# Patient Record
Sex: FEMALE | Race: WHITE | Employment: OTHER | URBAN - METROPOLITAN AREA
[De-identification: names, ages, dates, MRNs, and addresses within clinical notes are randomized per-mention and may not be internally consistent; named-entity substitution may affect disease eponyms.]

---

## 2018-07-13 ENCOUNTER — HOSPITAL ENCOUNTER (OUTPATIENT)
Age: 78
Discharge: HOME | End: 2018-07-15
Payer: MEDICARE

## 2018-07-13 ENCOUNTER — OFFICE VISIT (OUTPATIENT)
Dept: FAMILY MEDICINE CLINIC | Age: 78
End: 2018-07-13
Payer: MEDICARE

## 2018-07-13 VITALS
BODY MASS INDEX: 25.95 KG/M2 | WEIGHT: 141 LBS | HEART RATE: 85 BPM | DIASTOLIC BLOOD PRESSURE: 80 MMHG | SYSTOLIC BLOOD PRESSURE: 136 MMHG | OXYGEN SATURATION: 95 % | HEIGHT: 62 IN

## 2018-07-13 DIAGNOSIS — M79.602 PARESTHESIA AND PAIN OF BOTH UPPER EXTREMITIES: ICD-10-CM

## 2018-07-13 DIAGNOSIS — R53.82 CHRONIC FATIGUE: ICD-10-CM

## 2018-07-13 DIAGNOSIS — R73.01 IFG (IMPAIRED FASTING GLUCOSE): ICD-10-CM

## 2018-07-13 DIAGNOSIS — M79.601 PARESTHESIA AND PAIN OF BOTH UPPER EXTREMITIES: ICD-10-CM

## 2018-07-13 DIAGNOSIS — R20.2 PARESTHESIA AND PAIN OF BOTH UPPER EXTREMITIES: ICD-10-CM

## 2018-07-13 DIAGNOSIS — R20.2 PARESTHESIA AND PAIN OF BOTH UPPER EXTREMITIES: Primary | ICD-10-CM

## 2018-07-13 DIAGNOSIS — M79.602 PARESTHESIA AND PAIN OF BOTH UPPER EXTREMITIES: Primary | ICD-10-CM

## 2018-07-13 DIAGNOSIS — M79.601 PARESTHESIA AND PAIN OF BOTH UPPER EXTREMITIES: Primary | ICD-10-CM

## 2018-07-13 DIAGNOSIS — R20.2 PARESTHESIA OF BOTH LOWER EXTREMITIES: ICD-10-CM

## 2018-07-13 LAB
ALBUMIN SERPL-MCNC: 4.2 G/DL (ref 3.5–5.2)
ALP BLD-CCNC: 86 U/L (ref 35–104)
ALT SERPL-CCNC: 11 U/L (ref 0–32)
ANION GAP SERPL CALCULATED.3IONS-SCNC: 15 MMOL/L (ref 7–16)
AST SERPL-CCNC: 17 U/L (ref 0–31)
BASOPHILS ABSOLUTE: 0.04 E9/L (ref 0–0.2)
BASOPHILS RELATIVE PERCENT: 0.5 % (ref 0–2)
BILIRUB SERPL-MCNC: 0.5 MG/DL (ref 0–1.2)
BUN BLDV-MCNC: 9 MG/DL (ref 8–23)
CALCIUM SERPL-MCNC: 9.7 MG/DL (ref 8.6–10.2)
CHLORIDE BLD-SCNC: 99 MMOL/L (ref 98–107)
CO2: 28 MMOL/L (ref 22–29)
CREAT SERPL-MCNC: 0.8 MG/DL (ref 0.5–1)
EOSINOPHILS ABSOLUTE: 0.08 E9/L (ref 0.05–0.5)
EOSINOPHILS RELATIVE PERCENT: 1 % (ref 0–6)
FOLATE: 14.5 NG/ML (ref 4.8–24.2)
GFR AFRICAN AMERICAN: >60
GFR NON-AFRICAN AMERICAN: >60 ML/MIN/1.73
GLUCOSE BLD-MCNC: 73 MG/DL (ref 74–109)
HBA1C MFR BLD: 5.2 % (ref 4–5.6)
HCT VFR BLD CALC: 54.5 % (ref 34–48)
HEMOGLOBIN: 17.7 G/DL (ref 11.5–15.5)
IMMATURE GRANULOCYTES #: 0.05 E9/L
IMMATURE GRANULOCYTES %: 0.6 % (ref 0–5)
LYMPHOCYTES ABSOLUTE: 1.72 E9/L (ref 1.5–4)
LYMPHOCYTES RELATIVE PERCENT: 20.8 % (ref 20–42)
MCH RBC QN AUTO: 32.6 PG (ref 26–35)
MCHC RBC AUTO-ENTMCNC: 32.5 % (ref 32–34.5)
MCV RBC AUTO: 100.4 FL (ref 80–99.9)
MONOCYTES ABSOLUTE: 0.67 E9/L (ref 0.1–0.95)
MONOCYTES RELATIVE PERCENT: 8.1 % (ref 2–12)
NEUTROPHILS ABSOLUTE: 5.71 E9/L (ref 1.8–7.3)
NEUTROPHILS RELATIVE PERCENT: 69 % (ref 43–80)
PDW BLD-RTO: 12.8 FL (ref 11.5–15)
PLATELET # BLD: 244 E9/L (ref 130–450)
PMV BLD AUTO: 9.2 FL (ref 7–12)
POTASSIUM SERPL-SCNC: 4.3 MMOL/L (ref 3.5–5)
RBC # BLD: 5.43 E12/L (ref 3.5–5.5)
RHEUMATOID FACTOR: 18 IU/ML (ref 0–13)
SODIUM BLD-SCNC: 142 MMOL/L (ref 132–146)
T4 FREE: 1.3 NG/DL (ref 0.93–1.7)
TOTAL PROTEIN: 7.8 G/DL (ref 6.4–8.3)
TSH SERPL DL<=0.05 MIU/L-ACNC: 2.26 UIU/ML (ref 0.27–4.2)
VITAMIN B-12: 347 PG/ML (ref 211–946)
WBC # BLD: 8.3 E9/L (ref 4.5–11.5)

## 2018-07-13 PROCEDURE — 86200 CCP ANTIBODY: CPT

## 2018-07-13 PROCEDURE — 1123F ACP DISCUSS/DSCN MKR DOCD: CPT | Performed by: FAMILY MEDICINE

## 2018-07-13 PROCEDURE — 99214 OFFICE O/P EST MOD 30 MIN: CPT | Performed by: FAMILY MEDICINE

## 2018-07-13 PROCEDURE — 1101F PT FALLS ASSESS-DOCD LE1/YR: CPT | Performed by: FAMILY MEDICINE

## 2018-07-13 PROCEDURE — 1090F PRES/ABSN URINE INCON ASSESS: CPT | Performed by: FAMILY MEDICINE

## 2018-07-13 PROCEDURE — 4004F PT TOBACCO SCREEN RCVD TLK: CPT | Performed by: FAMILY MEDICINE

## 2018-07-13 PROCEDURE — 85651 RBC SED RATE NONAUTOMATED: CPT

## 2018-07-13 PROCEDURE — 86431 RHEUMATOID FACTOR QUANT: CPT

## 2018-07-13 PROCEDURE — G8400 PT W/DXA NO RESULTS DOC: HCPCS | Performed by: FAMILY MEDICINE

## 2018-07-13 PROCEDURE — G8419 CALC BMI OUT NRM PARAM NOF/U: HCPCS | Performed by: FAMILY MEDICINE

## 2018-07-13 PROCEDURE — 82607 VITAMIN B-12: CPT

## 2018-07-13 PROCEDURE — 84439 ASSAY OF FREE THYROXINE: CPT

## 2018-07-13 PROCEDURE — 80053 COMPREHEN METABOLIC PANEL: CPT

## 2018-07-13 PROCEDURE — 83036 HEMOGLOBIN GLYCOSYLATED A1C: CPT

## 2018-07-13 PROCEDURE — 84443 ASSAY THYROID STIM HORMONE: CPT

## 2018-07-13 PROCEDURE — 4040F PNEUMOC VAC/ADMIN/RCVD: CPT | Performed by: FAMILY MEDICINE

## 2018-07-13 PROCEDURE — 85025 COMPLETE CBC W/AUTO DIFF WBC: CPT

## 2018-07-13 PROCEDURE — 82746 ASSAY OF FOLIC ACID SERUM: CPT

## 2018-07-13 PROCEDURE — G8427 DOCREV CUR MEDS BY ELIG CLIN: HCPCS | Performed by: FAMILY MEDICINE

## 2018-07-13 PROCEDURE — 36415 COLL VENOUS BLD VENIPUNCTURE: CPT

## 2018-07-13 ASSESSMENT — PATIENT HEALTH QUESTIONNAIRE - PHQ9
SUM OF ALL RESPONSES TO PHQ9 QUESTIONS 1 & 2: 1
2. FEELING DOWN, DEPRESSED OR HOPELESS: 1
2. FEELING DOWN, DEPRESSED OR HOPELESS: 0
1. LITTLE INTEREST OR PLEASURE IN DOING THINGS: 0
SUM OF ALL RESPONSES TO PHQ QUESTIONS 1-9: 1
1. LITTLE INTEREST OR PLEASURE IN DOING THINGS: 0
SUM OF ALL RESPONSES TO PHQ QUESTIONS 1-9: 0
SUM OF ALL RESPONSES TO PHQ9 QUESTIONS 1 & 2: 0
SUM OF ALL RESPONSES TO PHQ QUESTIONS 1-9: 0
1. LITTLE INTEREST OR PLEASURE IN DOING THINGS: 0
1. LITTLE INTEREST OR PLEASURE IN DOING THINGS: 0
SUM OF ALL RESPONSES TO PHQ9 QUESTIONS 1 & 2: 0
SUM OF ALL RESPONSES TO PHQ QUESTIONS 1-9: 0
2. FEELING DOWN, DEPRESSED OR HOPELESS: 0
SUM OF ALL RESPONSES TO PHQ9 QUESTIONS 1 & 2: 0
2. FEELING DOWN, DEPRESSED OR HOPELESS: 0

## 2018-07-14 LAB — SEDIMENTATION RATE, ERYTHROCYTE: 10 MM/HR (ref 0–20)

## 2018-07-16 LAB — CCP IGG ANTIBODIES: 10 UNITS (ref 0–19)

## 2018-07-16 ASSESSMENT — ENCOUNTER SYMPTOMS
EYE DISCHARGE: 0
PHOTOPHOBIA: 0
STRIDOR: 0
FACIAL SWELLING: 0
CHOKING: 0
EYE PAIN: 0
ABDOMINAL DISTENTION: 0
BLOOD IN STOOL: 0
EYE ITCHING: 0
BACK PAIN: 0
VOICE CHANGE: 0
TROUBLE SWALLOWING: 0
CONSTIPATION: 0
SINUS PRESSURE: 0
COUGH: 0
COLOR CHANGE: 0
CHEST TIGHTNESS: 0
WHEEZING: 0
NAUSEA: 0
VOMITING: 0
ABDOMINAL PAIN: 0
RHINORRHEA: 0
SHORTNESS OF BREATH: 0
SORE THROAT: 0
RECTAL PAIN: 0
APNEA: 0
EYE REDNESS: 0
ANAL BLEEDING: 0
DIARRHEA: 0

## 2018-07-17 ENCOUNTER — TELEPHONE (OUTPATIENT)
Dept: PHYSICAL MEDICINE AND REHAB | Age: 78
End: 2018-07-17

## 2018-07-17 NOTE — PROGRESS NOTES
and reactive to light. Right eye exhibits no discharge. Left eye exhibits no discharge. No scleral icterus. Neck: Normal range of motion. Neck supple. No JVD present. No tracheal deviation present. No thyromegaly present. Cardiovascular: Normal rate, regular rhythm, normal heart sounds and intact distal pulses. Exam reveals no gallop and no friction rub. No murmur heard. Pulmonary/Chest: Effort normal and breath sounds normal. No stridor. No respiratory distress. She has no wheezes. She has no rales. She exhibits no tenderness. Abdominal: Soft. Bowel sounds are normal. She exhibits no distension and no mass. There is no tenderness. There is no rebound and no guarding. Genitourinary:   Genitourinary Comments: Will follow with own gynecologist for gynecological and breast care. Musculoskeletal: Normal range of motion. She exhibits no edema or tenderness. Lymphadenopathy:     She has no cervical adenopathy. Neurological: She is alert and oriented to person, place, and time. She has normal reflexes. No cranial nerve deficit. She exhibits normal muscle tone. Coordination normal.   Skin: Skin is warm and dry. No rash noted. She is not diaphoretic. No erythema. No pallor. Psychiatric: She has a normal mood and affect. Her behavior is normal. Judgment and thought content normal.   Nursing note and vitals reviewed. Assessment / Plan:      Huong Bello was seen today for other. Diagnoses and all orders for this visit:    Paresthesia and pain of both upper extremities  -     CBC Auto Differential; Future  -     Comprehensive Metabolic Panel; Future  -     Hemoglobin A1C; Future  -     Vitamin B12 & Folate; Future  -     TSH without Reflex; Future  -     T4, Free; Future  -     Sedimentation Rate; Future  -     Rheumatoid Factor; Future  -     Cyclic Citrul Peptide Antibody, IgG;  Future  -     Cancel: EMG; Future  -     EMG; Future    Paresthesia of both lower extremities  -     CBC Auto Differential; Future  -     Comprehensive Metabolic Panel; Future  -     Hemoglobin A1C; Future  -     Vitamin B12 & Folate; Future  -     TSH without Reflex; Future  -     T4, Free; Future  -     Sedimentation Rate; Future  -     Rheumatoid Factor; Future  -     Cyclic Citrul Peptide Antibody, IgG; Future  -     Cancel: EMG; Future  -     EMG; Future    Chronic fatigue  -     CBC Auto Differential; Future  -     Comprehensive Metabolic Panel; Future  -     Hemoglobin A1C; Future  -     Vitamin B12 & Folate; Future  -     TSH without Reflex; Future  -     T4, Free; Future  -     Sedimentation Rate; Future  -     Rheumatoid Factor; Future  -     Cyclic Citrul Peptide Antibody, IgG; Future  -     Cancel: EMG; Future    IFG (impaired fasting glucose)  -     CBC Auto Differential; Future  -     Comprehensive Metabolic Panel; Future  -     Hemoglobin A1C; Future  -     Vitamin B12 & Folate; Future  -     TSH without Reflex; Future  -     T4, Free; Future  -     Sedimentation Rate; Future  -     Rheumatoid Factor; Future  -     Cyclic Citrul Peptide Antibody, IgG; Future  -     Cancel: EMG; Future    Will follow with own gynecologist for gynecological and breast care. reviewed health maintenance report. Patient is aware of deficiencies and suggested preventative tests.

## 2018-07-31 ENCOUNTER — OFFICE VISIT (OUTPATIENT)
Dept: PHYSICAL MEDICINE AND REHAB | Age: 78
End: 2018-07-31
Payer: MEDICARE

## 2018-07-31 VITALS — BODY MASS INDEX: 24.8 KG/M2 | WEIGHT: 140 LBS | HEIGHT: 63 IN

## 2018-07-31 DIAGNOSIS — M79.602 PARESTHESIA AND PAIN OF BOTH UPPER EXTREMITIES: ICD-10-CM

## 2018-07-31 DIAGNOSIS — R20.2 PARESTHESIA OF BOTH LOWER EXTREMITIES: ICD-10-CM

## 2018-07-31 DIAGNOSIS — M79.601 PARESTHESIA AND PAIN OF BOTH UPPER EXTREMITIES: ICD-10-CM

## 2018-07-31 DIAGNOSIS — R20.2 PARESTHESIA AND PAIN OF BOTH UPPER EXTREMITIES: ICD-10-CM

## 2018-07-31 PROCEDURE — 1090F PRES/ABSN URINE INCON ASSESS: CPT | Performed by: PHYSICAL MEDICINE & REHABILITATION

## 2018-07-31 PROCEDURE — 99202 OFFICE O/P NEW SF 15 MIN: CPT | Performed by: PHYSICAL MEDICINE & REHABILITATION

## 2018-07-31 PROCEDURE — 95913 NRV CNDJ TEST 13/> STUDIES: CPT | Performed by: PHYSICAL MEDICINE & REHABILITATION

## 2018-07-31 PROCEDURE — G8427 DOCREV CUR MEDS BY ELIG CLIN: HCPCS | Performed by: PHYSICAL MEDICINE & REHABILITATION

## 2018-07-31 PROCEDURE — 1123F ACP DISCUSS/DSCN MKR DOCD: CPT | Performed by: PHYSICAL MEDICINE & REHABILITATION

## 2018-07-31 PROCEDURE — G8420 CALC BMI NORM PARAMETERS: HCPCS | Performed by: PHYSICAL MEDICINE & REHABILITATION

## 2018-07-31 PROCEDURE — 4004F PT TOBACCO SCREEN RCVD TLK: CPT | Performed by: PHYSICAL MEDICINE & REHABILITATION

## 2018-07-31 PROCEDURE — 1101F PT FALLS ASSESS-DOCD LE1/YR: CPT | Performed by: PHYSICAL MEDICINE & REHABILITATION

## 2018-07-31 PROCEDURE — 4040F PNEUMOC VAC/ADMIN/RCVD: CPT | Performed by: PHYSICAL MEDICINE & REHABILITATION

## 2018-07-31 PROCEDURE — 95886 MUSC TEST DONE W/N TEST COMP: CPT | Performed by: PHYSICAL MEDICINE & REHABILITATION

## 2018-07-31 PROCEDURE — G8400 PT W/DXA NO RESULTS DOC: HCPCS | Performed by: PHYSICAL MEDICINE & REHABILITATION

## 2018-07-31 NOTE — PROGRESS NOTES
Date of Examination: 07/31/18  Patient Name: Leann Moreno  is a 66y.o. year old female who was seen due to complaints of   Chief Complaint   Patient presents with    Hand Pain     Bilateral hand pain     Leg Pain     Bilateral leg and foot pain     that has been present for about a year and started after  No injury. Physical Exam: MSK: There is no joint effusion, deformity, instability, swelling, erythema or warmth. AROM is full in the spine and extremities. + Tinel bilateral wrists. Degenerative changes of the fingers. Neurologic:  Decreased bilateral median hand and lateral foot to light touch, weak  bilaterally otherwise, no focal sensorimotor deficit. Reflexes 2+ and symmetric. Gait is normal.    Impression:   1. Paresthesia and pain of both upper extremities    2. Paresthesia of both lower extremities        Plan:   · EMG is indicated to evaluate the above diagnosis. Orders Placed This Encounter   Procedures    NJ NEEDLE EMG EA EXTREMTY W/PARASPINL AREA COMPLETE    NJ MOTOR &/SENS 13/> NRV CNDJ PRECONF ELTRODE LIMB     · EMG was done today and showed bilateral carpal tunnel syndrome, bilateral S1 radiculopathy. The patient was educated about the diagnosis and the prognosis. · Recommend neutral wrist splints at h.s., OT and/or carpal tunnel injection and if no improvement after 4-6 weeks of conservative treatments consider orthopedic surgery evaluation. · Imaging lumbar spine. · Advised patient to follow up with referring provider. Thank you for allowing me to participate in the care of your patient.       Sincerely,     Julia Nagy

## 2018-07-31 NOTE — PROGRESS NOTES
[address]  Electrodiagnostic Laboratory  *Accredited [facility] the [facility]  [address] [facility]  Phone: [phone]  Fax: [phone]      Referring Provider: Audrey Le DO  Primary Care Physician: Obdulio Morley DO  Patient Name: Susan Hartley  Patient YOB: 1940  Gender: female  BMI: Body mass index is 24.8 kg/m². Height 5' 3\" (1.6 m), weight 140 lb (63.5 kg). 7/31/2018    Description of clinical problem:   Chief Complaint   Patient presents with    Hand Pain     Bilateral hand pain     Leg Pain     Bilateral leg and foot pain      Pain Yes  Pain Score:   6; Numbness/tingling  No; Weakness  No       Special considerations:   Pacemaker/Defibrillator No; Anticoagulation or Antiplatelet No; Mestinon No; botulinum Toxin  No       Pertinent history:  Diabetes  No; Thyroid disease No; Alcohol abuse No; Family history of neuromuscular disease No; Pertinent surgical history No    Allergies: Adhesive: No; Isopropyl alcohol: No       Brief physical exam:   Sensory deficit Yes bilateral lateral foot, bilateral median hand; Weakness Yes gip strength bilaterally; Atrophy  No; Reflex abnormality No    Consent: The patient was advised of the indications, risks, benefits and alternatives to nerve conduction studies and electromyography and agreed to proceed. Sensory NCS      Nerve / Sites Rec. Site Peak Lat PP Amp Segments Distance Velocity Temp.      ms µV  cm m/s °C   R Median - Digit II (Antidromic)      Palm Dig II 1.98 32.1 Palm - Dig II 7 50 32.5      Wrist Dig II 3.65 26.1 Wrist - Dig II 14 51 32.6   L Median - Digit II (Antidromic)      Palm Dig II 2.03 35.6 Palm - Dig II 7 52 32.3      Wrist Dig II 4.06* 33.3 Wrist - Dig II 14 44 32.3   R Ulnar - Digit V (Antidromic)      Wrist Dig V 3.75 14.4 Wrist - Dig V 14 49 32.3   R Radial - Anatomical snuff box (Forearm)      Forearm Wrist 2.45 16.8 Forearm - Wrist 10 55 32.4 cm)  Age 20-48 BMI <24  Age 47-78 BMI <24  Age 20-48 BMI >/= 24  Age 47-78 BMI >/= 24   8  13  19  15  13  8   2.3  4     Ulnar Sensory Antidromic Dig V (14 cm)  Age 20-48 BMI <24  Age 47-78 BMI <24  Age 20-48 BMI >/= 24  Age 47-78 BMI >/= 24 9  13  13  8  4 4   Medial Antebrachial cutaneous Sensory Antidromic (10 cm)   3 2.6   Lateral Antebrachial cutaneous Sensory Antidromic (10 cm) 6 2.5   Sural Sensory Antidromic (14 cm) 4 4.5     Medial and lateral Plantars (14 cm)   Compare side to side <3.8     Superficial peroneal sensory (10 cm)  Age <9  Age 7-34  Age 35-46  Age 52-63  Age >57   >6  >6  >5  >4  >3   <4.3  <4.4  <4.5  <4.6  <4.6     Saphenous sensory (12 cm)  Age <9  Age 7-34  Age 35-46  Age 52-63  Age >57   >6  >6  >4  >4  >3   <4.3  <4.4  <4.5  <4.6  <4.6     Dorsal ulnar sensory (8 cm)  Age <9  Age 7-34  Age 35-46  Age 52-63  Age >57   >24  >24  >16  >9  >5   <2.9  <3  <3.1  <3.1  <3.2         Study Latency difference (ms)   Median compared to (minus) ulnar motor comparison  All ages  Age 20-48  Age 47-78   1.5  1.4  1.7   Median to Ulnar comparison (second lumbrical/interossei)  0.4     Combined Sensory Index:   Study Latency Difference (ms)</=   Median to Ulnar Palmar Orthodromic mixed nerve comparison 0.3   Median to Ulnar sensory Ring finger comparison 0.4       Median: Radial sensory digit 1 comparison 0.5     Combined Sensory Index (CSI)  0.9       Motor Nerves Baseline to Peak Amplitude  (mV) Conduction Velocity (m/s) Distal Latency (ms)   Median motor APB  All ages  Men Age21 to 44  Men Age 36 to 52  Men Age 48 to 61  Men Age 61 to 71  Men age 79 to 78  Women Age 23 to 44  Women Age 36 to 52  Women Age 48 to 61  Women Age 61 to 71  Women Age 79 to 78   4.1  5.9  4.2   4.2   3.8  3.8  5.9  4.2  4.2  3.8  3.8   49  49  47  47  47  47  53  51  51  51  51   4.5  4.6  4.6  4.7  4.7  4.7  4.4  4.4  4.4  4.4  4.4   Ulnar motor ADM  All ages  Below elbow  Across elbow  Above elbow  CV drop across elbow  % CV drop across elbow   7.9     52  43  50  15 m/s  23%   3.7   Fibular (peroneal) motor EDB  All ages  Age 23 to 44 <170 cm tall   Age 23 to 44 >170 cm tall  Age 36 to 78 <170 cm tall  Age 36 to 78 >170 cm tall  CV across fibular head  CV drop across fibular head  % CV drop across fibular head  % amplitude drop ankle to below fibula  % amplitude drop across fibular head   1.3  2.6  2.6  1.1  1.1        32%  25%   38  43  37  39  36  42  6m/s  12%     6.5  6.5  6.5  6.5  6.5   Tibial motor AH  All ages  Age 23 to 34 <160 cm tall  Age 30-49 <160 cm tall  Age 48 to 61 <160 cm tall  Age 61 to 78 <160 cm tall  Age 23 to 34, 160-170 cm tall  Age 30-49 160-170 cm tall  Age 48 to 61 160-170 cm tall  Age 61 to 78 160-170 cm tall  Age 23 to 34 >/=170 cm tall  Age 30-49 >/=170 cm tall  Age 48 to 61 >/=170 cm tall  Age 61 to 78 >/=170 cm tall  Amplitude drop from ankle to knee  % amplitude drop ankle to knee   4.4  5.8  5.3  5.3  1.1  5.8  5.3  5.3  1.1  5.8  5.3  5.3  1.1  10.3  71%   39  44  44  40  40  42  42  34  34  37  37  34  34   6.1  6.1  6.1  6.1  6.1  6.1  6.1  6.1  6.1  6.1  6.1  6.1  6.1     Ulnar motor (FDI)  Age <9  Age 7-34  Age 35-46  Age 52-63  Age >57   >8  >8  >7  >7  >7   >51  >51  >50  >50  >50   <3.8  <3.8  <4.3  <4.5  <4.5     Radial motor (EDC)  Age <9  Age 7-34  Age 35-46  Age 52-63  Age >57   >6  >6  >6  >5  >5   >47  >47  >50  >50  >50   <3.0  <3.0  <3.1  <3.1  <3.1   Musculocutaneous motor (Biceps)   Age <9  Age 7-34  Age 35-46  Age 52-63  Age >57   >4  >4  >4  >4  >3    <3.5  <3.5  <3.5  <3.5  <3.8   Axillary motor (Deltoid)  Age <9  Age 7-34  Age 35-46  Age 52-63  Age >57   >4  >4  >4  >4  >3    <4.8  <4.8  <4.8  <4.8  <5     Tibial motor (ADQP)  Age <9  Age 7-34  Age 35-46  Age 52-63  Age >57   >4  >4  >4  >3  >3   >41  >41  >41  >40  >40   <6.0  <6.0  <6.5  <6.5  <6.5   Peroneal motor (TA)  Age <9  Age 7-32>4  Age 35-46  Age 52-63  Age >57   >4  >4  >4  >3  >3

## 2018-08-06 ENCOUNTER — TELEPHONE (OUTPATIENT)
Dept: FAMILY MEDICINE CLINIC | Age: 78
End: 2018-08-06

## 2018-08-06 DIAGNOSIS — R53.82 CHRONIC FATIGUE: ICD-10-CM

## 2018-08-06 DIAGNOSIS — H26.40 SECONDARY CATARACT OF BOTH EYES, UNSPECIFIED SECONDARY CATARACT TYPE: ICD-10-CM

## 2018-08-06 DIAGNOSIS — D58.2 ELEVATED HEMOGLOBIN (HCC): ICD-10-CM

## 2018-08-06 DIAGNOSIS — R73.01 IFG (IMPAIRED FASTING GLUCOSE): ICD-10-CM

## 2018-08-06 DIAGNOSIS — G56.03 BILATERAL CARPAL TUNNEL SYNDROME: ICD-10-CM

## 2018-08-06 DIAGNOSIS — F41.9 ANXIETY: ICD-10-CM

## 2018-08-06 PROBLEM — H26.9 CATARACT OF BOTH EYES: Status: ACTIVE | Noted: 2018-08-06

## 2018-08-06 NOTE — TELEPHONE ENCOUNTER
Tried to call home phone and it was invalid. This was removed from chart. No cell number listed. Pt returns next week and we will discuss at that time and will need to get a new phone number.  Note made in appt slot

## 2018-08-14 ENCOUNTER — OFFICE VISIT (OUTPATIENT)
Dept: FAMILY MEDICINE CLINIC | Age: 78
End: 2018-08-14
Payer: MEDICARE

## 2018-08-14 VITALS
SYSTOLIC BLOOD PRESSURE: 140 MMHG | HEART RATE: 80 BPM | OXYGEN SATURATION: 95 % | WEIGHT: 140 LBS | BODY MASS INDEX: 24.8 KG/M2 | DIASTOLIC BLOOD PRESSURE: 72 MMHG

## 2018-08-14 DIAGNOSIS — R53.83 FATIGUE, UNSPECIFIED TYPE: ICD-10-CM

## 2018-08-14 DIAGNOSIS — G56.03 BILATERAL CARPAL TUNNEL SYNDROME: Primary | ICD-10-CM

## 2018-08-14 DIAGNOSIS — F41.9 ANXIETY: ICD-10-CM

## 2018-08-14 PROCEDURE — 4040F PNEUMOC VAC/ADMIN/RCVD: CPT | Performed by: FAMILY MEDICINE

## 2018-08-14 PROCEDURE — G8400 PT W/DXA NO RESULTS DOC: HCPCS | Performed by: FAMILY MEDICINE

## 2018-08-14 PROCEDURE — 1090F PRES/ABSN URINE INCON ASSESS: CPT | Performed by: FAMILY MEDICINE

## 2018-08-14 PROCEDURE — G8427 DOCREV CUR MEDS BY ELIG CLIN: HCPCS | Performed by: FAMILY MEDICINE

## 2018-08-14 PROCEDURE — 99213 OFFICE O/P EST LOW 20 MIN: CPT | Performed by: FAMILY MEDICINE

## 2018-08-14 PROCEDURE — 1123F ACP DISCUSS/DSCN MKR DOCD: CPT | Performed by: FAMILY MEDICINE

## 2018-08-14 PROCEDURE — G8510 SCR DEP NEG, NO PLAN REQD: HCPCS | Performed by: FAMILY MEDICINE

## 2018-08-14 PROCEDURE — 1101F PT FALLS ASSESS-DOCD LE1/YR: CPT | Performed by: FAMILY MEDICINE

## 2018-08-14 PROCEDURE — 4004F PT TOBACCO SCREEN RCVD TLK: CPT | Performed by: FAMILY MEDICINE

## 2018-08-14 PROCEDURE — G8420 CALC BMI NORM PARAMETERS: HCPCS | Performed by: FAMILY MEDICINE

## 2018-08-14 RX ORDER — BUSPIRONE HYDROCHLORIDE 7.5 MG/1
7.5 TABLET ORAL 2 TIMES DAILY
Qty: 60 TABLET | Refills: 5 | Status: SHIPPED | OUTPATIENT
Start: 2018-08-14 | End: 2019-04-16

## 2018-08-14 ASSESSMENT — PATIENT HEALTH QUESTIONNAIRE - PHQ9
1. LITTLE INTEREST OR PLEASURE IN DOING THINGS: 0
2. FEELING DOWN, DEPRESSED OR HOPELESS: 0
SUM OF ALL RESPONSES TO PHQ QUESTIONS 1-9: 0
SUM OF ALL RESPONSES TO PHQ9 QUESTIONS 1 & 2: 0
SUM OF ALL RESPONSES TO PHQ QUESTIONS 1-9: 0

## 2018-08-15 ASSESSMENT — ENCOUNTER SYMPTOMS
EYE REDNESS: 0
ANAL BLEEDING: 0
SHORTNESS OF BREATH: 0
VOMITING: 0
STRIDOR: 0
SINUS PRESSURE: 0
RECTAL PAIN: 0
CONSTIPATION: 0
ABDOMINAL PAIN: 0
PHOTOPHOBIA: 0
COLOR CHANGE: 0
EYE PAIN: 0
EYE ITCHING: 0
APNEA: 0
FACIAL SWELLING: 0
EYE DISCHARGE: 0
ABDOMINAL DISTENTION: 0
WHEEZING: 0
CHEST TIGHTNESS: 0
CHOKING: 0
SORE THROAT: 0
VOICE CHANGE: 0
BACK PAIN: 0
BLOOD IN STOOL: 0
COUGH: 0
TROUBLE SWALLOWING: 0
NAUSEA: 0
RHINORRHEA: 0
DIARRHEA: 0

## 2018-08-15 NOTE — PROGRESS NOTES
Makeda Burleson is a 66 y.o. female  . Subjective:      Hand Pain    The incident occurred more than 1 week ago. There was no injury mechanism. The pain is present in the right hand, right fingers, left fingers and left hand (CTS). The quality of the pain is described as aching (improving). The pain is mild. The pain has been improving since the incident. Associated symptoms include numbness. Pertinent negatives include no chest pain. Nothing aggravates the symptoms. She has tried nothing for the symptoms. The treatment provided no relief. Other   This is a recurrent (anxiety) problem. The current episode started more than 1 year ago. The problem occurs intermittently. The problem has been waxing and waning. Associated symptoms include numbness. Pertinent negatives include no abdominal pain, arthralgias, chest pain, chills, congestion, coughing, diaphoresis, fatigue, fever, headaches, joint swelling, myalgias, nausea, neck pain, rash, sore throat, vomiting or weakness. The symptoms are aggravated by stress. She has tried nothing for the symptoms. The treatment provided no relief. Review of Systems   Constitutional: Negative for activity change, appetite change, chills, diaphoresis, fatigue, fever and unexpected weight change. HENT: Negative for congestion, dental problem, drooling, ear discharge, ear pain, facial swelling, hearing loss, mouth sores, nosebleeds, postnasal drip, rhinorrhea, sinus pressure, sneezing, sore throat, tinnitus, trouble swallowing and voice change. Eyes: Negative for photophobia, pain, discharge, redness, itching and visual disturbance. Respiratory: Negative for apnea, cough, choking, chest tightness, shortness of breath, wheezing and stridor. Cardiovascular: Negative for chest pain, palpitations and leg swelling.    Gastrointestinal: Negative for abdominal distention, abdominal pain, anal bleeding, blood in stool, constipation, diarrhea, nausea, rectal pain and Narrative    No narrative on file       Family History   Problem Relation Age of Onset    Cancer Father        No current outpatient prescriptions on file prior to visit. No current facility-administered medications on file prior to visit. No Known Allergies    I have reviewed her allergies, medications, problem list, medical, social and family history and have updated as needed in the electronic medical record. Objective:     Physical Exam   Constitutional: She is oriented to person, place, and time. She appears well-developed and well-nourished. No distress. HENT:   Head: Normocephalic and atraumatic. Right Ear: External ear normal.   Left Ear: External ear normal.   Nose: Nose normal.   Mouth/Throat: Oropharynx is clear and moist. No oropharyngeal exudate. Eyes: Pupils are equal, round, and reactive to light. Conjunctivae and EOM are normal. Right eye exhibits no discharge. Left eye exhibits no discharge. No scleral icterus. Neck: Normal range of motion. Neck supple. No JVD present. No tracheal deviation present. No thyromegaly present. Cardiovascular: Normal rate, regular rhythm, normal heart sounds and intact distal pulses. Exam reveals no gallop and no friction rub. No murmur heard. Pulmonary/Chest: Effort normal and breath sounds normal. No stridor. No respiratory distress. She has no wheezes. She has no rales. She exhibits no tenderness. Abdominal: Soft. Bowel sounds are normal. She exhibits no distension and no mass. There is no tenderness. There is no rebound and no guarding. Genitourinary:   Genitourinary Comments: Will follow with own gynecologist for gynecological and breast care. Musculoskeletal: Normal range of motion. She exhibits no edema or tenderness. Lymphadenopathy:     She has no cervical adenopathy. Neurological: She is alert and oriented to person, place, and time. She displays normal reflexes. No cranial nerve deficit. She exhibits normal muscle tone.

## 2018-10-16 ENCOUNTER — OFFICE VISIT (OUTPATIENT)
Dept: FAMILY MEDICINE CLINIC | Age: 78
End: 2018-10-16
Payer: MEDICARE

## 2018-10-16 VITALS
OXYGEN SATURATION: 98 % | BODY MASS INDEX: 24.59 KG/M2 | SYSTOLIC BLOOD PRESSURE: 124 MMHG | HEIGHT: 64 IN | HEART RATE: 93 BPM | DIASTOLIC BLOOD PRESSURE: 78 MMHG | WEIGHT: 144 LBS

## 2018-10-16 DIAGNOSIS — D58.2 ELEVATED HEMOGLOBIN (HCC): ICD-10-CM

## 2018-10-16 DIAGNOSIS — M51.16 LUMBAR DISC DISEASE WITH RADICULOPATHY: ICD-10-CM

## 2018-10-16 DIAGNOSIS — G56.03 BILATERAL CARPAL TUNNEL SYNDROME: Primary | ICD-10-CM

## 2018-10-16 PROBLEM — R53.82 CHRONIC FATIGUE: Status: RESOLVED | Noted: 2018-08-06 | Resolved: 2018-10-16

## 2018-10-16 PROBLEM — H26.9 CATARACT OF BOTH EYES: Status: RESOLVED | Noted: 2018-08-06 | Resolved: 2018-10-16

## 2018-10-16 PROCEDURE — G8427 DOCREV CUR MEDS BY ELIG CLIN: HCPCS | Performed by: FAMILY MEDICINE

## 2018-10-16 PROCEDURE — 1090F PRES/ABSN URINE INCON ASSESS: CPT | Performed by: FAMILY MEDICINE

## 2018-10-16 PROCEDURE — 99213 OFFICE O/P EST LOW 20 MIN: CPT | Performed by: FAMILY MEDICINE

## 2018-10-16 PROCEDURE — 4004F PT TOBACCO SCREEN RCVD TLK: CPT | Performed by: FAMILY MEDICINE

## 2018-10-16 PROCEDURE — G8400 PT W/DXA NO RESULTS DOC: HCPCS | Performed by: FAMILY MEDICINE

## 2018-10-16 PROCEDURE — G8419 CALC BMI OUT NRM PARAM NOF/U: HCPCS | Performed by: FAMILY MEDICINE

## 2018-10-16 PROCEDURE — G8510 SCR DEP NEG, NO PLAN REQD: HCPCS | Performed by: FAMILY MEDICINE

## 2018-10-16 PROCEDURE — 1123F ACP DISCUSS/DSCN MKR DOCD: CPT | Performed by: FAMILY MEDICINE

## 2018-10-16 PROCEDURE — 1101F PT FALLS ASSESS-DOCD LE1/YR: CPT | Performed by: FAMILY MEDICINE

## 2018-10-16 PROCEDURE — 4040F PNEUMOC VAC/ADMIN/RCVD: CPT | Performed by: FAMILY MEDICINE

## 2018-10-16 PROCEDURE — G8484 FLU IMMUNIZE NO ADMIN: HCPCS | Performed by: FAMILY MEDICINE

## 2018-10-16 RX ORDER — MELOXICAM 15 MG/1
15 TABLET ORAL DAILY
Qty: 30 TABLET | Refills: 0 | Status: SHIPPED | OUTPATIENT
Start: 2018-10-16 | End: 2019-04-16

## 2018-10-16 RX ORDER — GABAPENTIN 100 MG/1
30 CAPSULE ORAL NIGHTLY
Qty: 30 CAPSULE | Refills: 5 | Status: SHIPPED | OUTPATIENT
Start: 2018-10-16 | End: 2019-04-16

## 2018-10-16 ASSESSMENT — ENCOUNTER SYMPTOMS
CHOKING: 0
DIARRHEA: 0
SINUS PRESSURE: 0
FACIAL SWELLING: 0
STRIDOR: 0
APNEA: 0
ANAL BLEEDING: 0
ABDOMINAL DISTENTION: 0
PHOTOPHOBIA: 0
EYE PAIN: 0
EYE DISCHARGE: 0
WHEEZING: 0
TROUBLE SWALLOWING: 0
SHORTNESS OF BREATH: 0
BLOOD IN STOOL: 0
RECTAL PAIN: 0
BACK PAIN: 1
EYE REDNESS: 0
EYE ITCHING: 0
CHEST TIGHTNESS: 0
VOICE CHANGE: 0
COLOR CHANGE: 0
RHINORRHEA: 0
CONSTIPATION: 0

## 2018-10-16 ASSESSMENT — PATIENT HEALTH QUESTIONNAIRE - PHQ9
SUM OF ALL RESPONSES TO PHQ QUESTIONS 1-9: 0
1. LITTLE INTEREST OR PLEASURE IN DOING THINGS: 0
SUM OF ALL RESPONSES TO PHQ QUESTIONS 1-9: 0
2. FEELING DOWN, DEPRESSED OR HOPELESS: 0
2. FEELING DOWN, DEPRESSED OR HOPELESS: 0
SUM OF ALL RESPONSES TO PHQ9 QUESTIONS 1 & 2: 0
SUM OF ALL RESPONSES TO PHQ9 QUESTIONS 1 & 2: 0
1. LITTLE INTEREST OR PLEASURE IN DOING THINGS: 0

## 2018-10-17 NOTE — PROGRESS NOTES
in stool, constipation, diarrhea and rectal pain. Endocrine: Negative for cold intolerance, heat intolerance, polydipsia, polyphagia and polyuria. Genitourinary: Negative for decreased urine volume, difficulty urinating, dyspareunia, dysuria, enuresis, flank pain, frequency, genital sores, hematuria, menstrual problem, pelvic pain, urgency, vaginal bleeding, vaginal discharge and vaginal pain. Musculoskeletal: Positive for arthralgias and back pain. Negative for gait problem and neck stiffness. Skin: Negative for color change, pallor and wound. Allergic/Immunologic: Negative for environmental allergies, food allergies and immunocompromised state. Neurological: Positive for numbness. Negative for dizziness, tremors, seizures, syncope, facial asymmetry, speech difficulty and light-headedness. Hematological: Negative for adenopathy. Does not bruise/bleed easily. Psychiatric/Behavioral: Negative for agitation, behavioral problems, confusion, decreased concentration, dysphoric mood, hallucinations, self-injury, sleep disturbance and suicidal ideas. The patient is not nervous/anxious and is not hyperactive. Past Medical History:   Diagnosis Date    Anxiety     Bilateral carpal tunnel syndrome 8/6/2018    Cataract of both eyes 8/6/2018    Chronic fatigue 8/6/2018    Elevated hemoglobin (HCC) 8/6/2018    GERD (gastroesophageal reflux disease)     IFG (impaired fasting glucose) 8/6/2018       Social History     Social History    Marital status:      Spouse name: N/A    Number of children: N/A    Years of education: N/A     Occupational History    Not on file.      Social History Main Topics    Smoking status: Current Every Day Smoker     Packs/day: 2.00     Years: 20.00     Types: Cigarettes     Start date: 7/13/1955    Smokeless tobacco: Never Used    Alcohol use No    Drug use: No    Sexual activity: No     Other Topics Concern    Not on file     Social History Narrative    No

## 2018-10-22 DIAGNOSIS — M25.531 RIGHT WRIST PAIN: ICD-10-CM

## 2018-10-22 DIAGNOSIS — M25.532 LEFT WRIST PAIN: Primary | ICD-10-CM

## 2018-10-23 ENCOUNTER — OFFICE VISIT (OUTPATIENT)
Dept: ORTHOPEDIC SURGERY | Age: 78
End: 2018-10-23
Payer: MEDICARE

## 2018-10-23 VITALS — BODY MASS INDEX: 24.8 KG/M2 | TEMPERATURE: 98 F | HEIGHT: 63 IN | WEIGHT: 140 LBS

## 2018-10-23 DIAGNOSIS — M54.16 LUMBAR RADICULOPATHY: Primary | ICD-10-CM

## 2018-10-23 DIAGNOSIS — M18.0 PRIMARY OSTEOARTHRITIS OF BOTH FIRST CARPOMETACARPAL JOINTS: ICD-10-CM

## 2018-10-23 DIAGNOSIS — G56.03 BILATERAL CARPAL TUNNEL SYNDROME: ICD-10-CM

## 2018-10-23 DIAGNOSIS — Z77.22 TOBACCO SMOKE EXPOSURE: ICD-10-CM

## 2018-10-23 PROCEDURE — 99204 OFFICE O/P NEW MOD 45 MIN: CPT | Performed by: ORTHOPAEDIC SURGERY

## 2018-10-23 PROCEDURE — 1123F ACP DISCUSS/DSCN MKR DOCD: CPT | Performed by: ORTHOPAEDIC SURGERY

## 2018-10-23 PROCEDURE — G8484 FLU IMMUNIZE NO ADMIN: HCPCS | Performed by: ORTHOPAEDIC SURGERY

## 2018-10-23 PROCEDURE — 99406 BEHAV CHNG SMOKING 3-10 MIN: CPT | Performed by: ORTHOPAEDIC SURGERY

## 2018-10-23 PROCEDURE — G8427 DOCREV CUR MEDS BY ELIG CLIN: HCPCS | Performed by: ORTHOPAEDIC SURGERY

## 2018-10-23 PROCEDURE — 4004F PT TOBACCO SCREEN RCVD TLK: CPT | Performed by: ORTHOPAEDIC SURGERY

## 2018-10-23 PROCEDURE — G8420 CALC BMI NORM PARAMETERS: HCPCS | Performed by: ORTHOPAEDIC SURGERY

## 2018-10-23 PROCEDURE — 4040F PNEUMOC VAC/ADMIN/RCVD: CPT | Performed by: ORTHOPAEDIC SURGERY

## 2018-10-23 PROCEDURE — 97760 ORTHOTIC MGMT&TRAING 1ST ENC: CPT | Performed by: ORTHOPAEDIC SURGERY

## 2018-10-23 PROCEDURE — 1090F PRES/ABSN URINE INCON ASSESS: CPT | Performed by: ORTHOPAEDIC SURGERY

## 2018-10-23 PROCEDURE — 1101F PT FALLS ASSESS-DOCD LE1/YR: CPT | Performed by: ORTHOPAEDIC SURGERY

## 2018-10-23 PROCEDURE — G8400 PT W/DXA NO RESULTS DOC: HCPCS | Performed by: ORTHOPAEDIC SURGERY

## 2018-10-23 NOTE — PROGRESS NOTES
160/160/60 and for the Left shoulder is 160/160/60. Right shoulder Motor strength is 5/5 in the supraspinatus, 5/5 internal rotation and 5/5 in external rotation, and Left shoulder motor strength 5/5 in supraspinatus, 5/5 in internal rotation, 5/5 in external rotation. Elbow exam:    Evaluation of the elbow, reveals no signs of swelling or deformity. ROM is 0-130. There is not instability with varus/valgus stresses. Motor strength is 5/5 with flexion/extension. Wrist exam:       Inspection of the bilateral upper extremities, there is no evidence of deformity of the wrist.  ROM Wrist ROM R wrist DF 90, VF 80, L wrist DF 80, VF 90, R pronation 40/ supination 40, L pronation 40/supination 40. Motor strength is 5/5 with Dorsiflexion/Volarflexion/Supination/Pronation.  strength 5/5. Thenar eminence appears bilaterally symmetrical.   Motor and sensation is intact and symmetric throughout the bilateral upper extremities in the ulnar and radial , musclcutaneous, and axillary nerve distributions. There is paresthesia in the median nerve distribution of the left hand. Hand exam:    The skin overlying the hand is  intact. There is not evidence of scar, lesion, laceration, or abrasion. The motion in the small joints of the hand are intact with no stiffness or deformity. The ROM in the MCP flexion full/ extension full , PIP flexion full/ extension full, DIP flexion full/ extension full. There is not rotational deformity. There is no masses or adenopathy in bilateral upper extremities. Radial pulses are 2+ and symmetric bilaterally. Capillary refill is intact and < 2 seconds. Motor strength is 5/5 with flexion and extension of the small finger joints. Right:  Phallens sign(-), Tinnells sign (+), Median nerve compression test (+),  Finklesteins (-), CMC Grind test (-), [employer](-).     Left:  Phallens sign(+), Tinnells sign (+), Median nerve compression test (+),  Finklesteins (-), CMC assessment and discussion, patient was provided and fitted for a removable wrist brace distributed and applied. She was educated in detail how to use brace and the importance of use as well as range of motion and HEP exercises. Patient educated about the healing rates with this condition. Patient does smoke and does have secondhand smoke exposure. In this discussion of approximately 5 minutes, patient was counseled about decrease in smoking exposure regards to healing and overall good health. Patient seems reluctant but is willing to listen to the discussion in detail. She states that she will try to cut down as much as possible and states that she will try to quit completely by the next visit. Benry Cervantes DO  10/23/18    45 minutes was spent with patient. 50% or greater was spent counseling the patient.

## 2018-11-20 ENCOUNTER — OFFICE VISIT (OUTPATIENT)
Dept: PHYSICAL MEDICINE AND REHAB | Age: 78
End: 2018-11-20
Payer: MEDICARE

## 2018-11-20 VITALS — BODY MASS INDEX: 25.69 KG/M2 | WEIGHT: 145 LBS

## 2018-11-20 DIAGNOSIS — G56.03 BILATERAL CARPAL TUNNEL SYNDROME: Primary | ICD-10-CM

## 2018-11-20 DIAGNOSIS — M79.641 BILATERAL HAND PAIN: ICD-10-CM

## 2018-11-20 DIAGNOSIS — M79.642 BILATERAL HAND PAIN: ICD-10-CM

## 2018-11-20 PROCEDURE — G8400 PT W/DXA NO RESULTS DOC: HCPCS | Performed by: PHYSICAL MEDICINE & REHABILITATION

## 2018-11-20 PROCEDURE — 99214 OFFICE O/P EST MOD 30 MIN: CPT | Performed by: PHYSICAL MEDICINE & REHABILITATION

## 2018-11-20 PROCEDURE — G8484 FLU IMMUNIZE NO ADMIN: HCPCS | Performed by: PHYSICAL MEDICINE & REHABILITATION

## 2018-11-20 PROCEDURE — G8427 DOCREV CUR MEDS BY ELIG CLIN: HCPCS | Performed by: PHYSICAL MEDICINE & REHABILITATION

## 2018-11-20 PROCEDURE — G8419 CALC BMI OUT NRM PARAM NOF/U: HCPCS | Performed by: PHYSICAL MEDICINE & REHABILITATION

## 2018-11-20 PROCEDURE — 4004F PT TOBACCO SCREEN RCVD TLK: CPT | Performed by: PHYSICAL MEDICINE & REHABILITATION

## 2018-11-20 PROCEDURE — 1090F PRES/ABSN URINE INCON ASSESS: CPT | Performed by: PHYSICAL MEDICINE & REHABILITATION

## 2018-11-20 PROCEDURE — 1101F PT FALLS ASSESS-DOCD LE1/YR: CPT | Performed by: PHYSICAL MEDICINE & REHABILITATION

## 2018-11-20 PROCEDURE — 1123F ACP DISCUSS/DSCN MKR DOCD: CPT | Performed by: PHYSICAL MEDICINE & REHABILITATION

## 2018-11-20 PROCEDURE — 4040F PNEUMOC VAC/ADMIN/RCVD: CPT | Performed by: PHYSICAL MEDICINE & REHABILITATION

## 2018-11-20 PROCEDURE — 20526 THER INJECTION CARP TUNNEL: CPT | Performed by: PHYSICAL MEDICINE & REHABILITATION

## 2018-11-20 RX ORDER — TRIAMCINOLONE ACETONIDE 40 MG/ML
40 INJECTION, SUSPENSION INTRA-ARTICULAR; INTRAMUSCULAR ONCE
Status: COMPLETED | OUTPATIENT
Start: 2018-11-20 | End: 2018-11-20

## 2018-11-20 RX ORDER — LIDOCAINE HYDROCHLORIDE 10 MG/ML
1 INJECTION, SOLUTION INFILTRATION; PERINEURAL ONCE
Status: COMPLETED | OUTPATIENT
Start: 2018-11-20 | End: 2018-11-20

## 2018-11-20 RX ADMIN — LIDOCAINE HYDROCHLORIDE 1 ML: 10 INJECTION, SOLUTION INFILTRATION; PERINEURAL at 12:59

## 2018-11-20 RX ADMIN — TRIAMCINOLONE ACETONIDE 40 MG: 40 INJECTION, SUSPENSION INTRA-ARTICULAR; INTRAMUSCULAR at 12:59

## 2018-11-20 NOTE — PROGRESS NOTES
Administered By  Soco Wooten MA    Ordering Provider:  Hailee Barfield DO    NDC:  9048-8769-56    Lot#:  [id number]    :  [facility] (PRIMARY CARE)    Patient Supplied?:  No    Comments:  EXP: 02/2020

## 2018-11-20 NOTE — PATIENT INSTRUCTIONS
Patient Education        Carpal Tunnel Syndrome: Exercises  Your Care Instructions  Here are some examples of typical rehabilitation exercises for your condition. Start each exercise slowly. Ease off the exercise if you start to have pain. Your doctor or your physical or occupational therapist will tell you when you can start these exercises and which ones will work best for you. Warm-up stretches  When you no longer have pain or numbness, you can do exercises to help prevent carpal tunnel syndrome from coming back. Do not do any stretch or movement that is uncomfortable or painful. 1. Rotate your wrist up, down, and from side to side. Repeat 4 times. 2. Stretch your fingers far apart. Relax them, and then stretch them again. Repeat 4 times. 3. Stretch your thumb by pulling it back gently, holding it, and then releasing it. Repeat 4 times. How to do the exercises  Prayer stretch    1. Start with your palms together in front of your chest just below your chin. 2. Slowly lower your hands toward your waistline, keeping your hands close to your stomach and your palms together until you feel a mild to moderate stretch under your forearms. 3. Hold for at least 15 to 30 seconds. Repeat 2 to 4 times. Wrist flexor stretch    1. Extend your arm in front of you with your palm up. 2. Bend your wrist, pointing your hand toward the floor. 3. With your other hand, gently bend your wrist farther until you feel a mild to moderate stretch in your forearm. 4. Hold for at least 15 to 30 seconds. Repeat 2 to 4 times. Wrist extensor stretch    1. Repeat steps 1 through 4 of the stretch above, but begin with your extended hand palm down. Follow-up care is a key part of your treatment and safety. Be sure to make and go to all appointments, and call your doctor if you are having problems. It's also a good idea to know your test results and keep a list of the medicines you take. Where can you learn more?   Go to [email or web address] and sign in to your [employer] account. Enter H249 in the [employer] box to learn more about \"Carpal Tunnel Syndrome: Exercises. \"     If you do not have an account, please click on the \"Sign Up Now\" link. Current as of: November 29, 2017  Content Version: 11.8  © 6155-6593 [employer]. Care instructions adapted under license by [employer]. If you have questions about a medical condition or this instruction, always ask your healthcare professional. [address] any warranty or liability for your use of this information.

## 2018-12-04 ENCOUNTER — OFFICE VISIT (OUTPATIENT)
Dept: PHYSICAL MEDICINE AND REHAB | Age: 78
End: 2018-12-04
Payer: MEDICARE

## 2018-12-04 VITALS
SYSTOLIC BLOOD PRESSURE: 133 MMHG | WEIGHT: 144 LBS | TEMPERATURE: 98.1 F | DIASTOLIC BLOOD PRESSURE: 88 MMHG | BODY MASS INDEX: 25.52 KG/M2 | HEIGHT: 63 IN

## 2018-12-04 DIAGNOSIS — G56.03 BILATERAL CARPAL TUNNEL SYNDROME: Primary | ICD-10-CM

## 2018-12-04 PROCEDURE — 20526 THER INJECTION CARP TUNNEL: CPT | Performed by: PHYSICAL MEDICINE & REHABILITATION

## 2018-12-04 RX ORDER — TRIAMCINOLONE ACETONIDE 40 MG/ML
40 INJECTION, SUSPENSION INTRA-ARTICULAR; INTRAMUSCULAR ONCE
Status: COMPLETED | OUTPATIENT
Start: 2018-12-04 | End: 2018-12-04

## 2018-12-04 RX ORDER — LIDOCAINE HYDROCHLORIDE 10 MG/ML
1 INJECTION, SOLUTION INFILTRATION; PERINEURAL ONCE
Status: COMPLETED | OUTPATIENT
Start: 2018-12-04 | End: 2018-12-04

## 2018-12-04 RX ADMIN — LIDOCAINE HYDROCHLORIDE 1 ML: 10 INJECTION, SOLUTION INFILTRATION; PERINEURAL at 15:00

## 2018-12-04 RX ADMIN — TRIAMCINOLONE ACETONIDE 40 MG: 40 INJECTION, SUSPENSION INTRA-ARTICULAR; INTRAMUSCULAR at 15:00

## 2018-12-04 NOTE — PROGRESS NOTES
are no discontinued medications. PROCEDURE NOTE:   After explaining the indications, risks, benefits and alternatives of a left carpal tunnel injection, the patient agreed to proceed. Permit wwas signed and scanned into the media. The patient was placed in the seated position. The skin on the volar wrist was prepared with chloraprep. Ethyl Chloride vapocoolant spray was used for local anesthesia. Using an aseptic, no touch technique, a 25 gauge, 5/8\" needle with 2 cc of Xylocaine 1% and 1 cc of Kenalog 40 mg/cc was directed into the carpal tunnel using ultrasound guidance  After negative aspiration, the medication was injected. Adequate hemostasis was obtained and a bandage applied to the injection site. The patient tolerated the procedure well and was educated in post injection care. There was post injection reduction in pain. The images are uploaded separately in the EMR. The injection was performed by Dr. Savita Freeman PGY1 under my direct, hand over hand assistance and supervision. The patient was educated about the diagnosis, prognosis, indications, risks and benefits of treatment. An opportunity to ask questions was given to the patient and questions were answered. The patient agreed to proceed with the recommended treatment as described above. Follow up prn symptoms recur    Thank you for allowing me to participate in the care of your patient. Luis Snow D.O., P.T.   Board Certified Physical Medicine and Rehabilitation  Board Certified Electrodiagnostic Medicine    Administrations This Visit     lidocaine 1 % injection 1 mL     Admin Date  12/04/2018  15:00 Action  Given Dose  1 mL Route  Other Site   Administered By  Joesphine Carrel, MA    Ordering Provider:  Katerina Still DO    NDC:  5637-7496-13    Lot#:  [id number]    :  Clemmie Papa    Patient Supplied?:  No    Comments:  EXP: 09/2020          triamcinolone acetonide (KENALOG-40) injection 40 mg     Admin

## 2019-02-25 ENCOUNTER — OFFICE VISIT (OUTPATIENT)
Dept: PHYSICAL MEDICINE AND REHAB | Age: 79
End: 2019-02-25
Payer: MEDICARE

## 2019-02-25 VITALS
BODY MASS INDEX: 24.98 KG/M2 | SYSTOLIC BLOOD PRESSURE: 118 MMHG | HEART RATE: 83 BPM | WEIGHT: 141 LBS | DIASTOLIC BLOOD PRESSURE: 80 MMHG | HEIGHT: 63 IN | TEMPERATURE: 97.7 F

## 2019-02-25 DIAGNOSIS — G56.03 BILATERAL CARPAL TUNNEL SYNDROME: Primary | ICD-10-CM

## 2019-02-25 PROCEDURE — 76942 ECHO GUIDE FOR BIOPSY: CPT | Performed by: PHYSICAL MEDICINE & REHABILITATION

## 2019-02-25 PROCEDURE — 20526 THER INJECTION CARP TUNNEL: CPT | Performed by: PHYSICAL MEDICINE & REHABILITATION

## 2019-02-25 RX ORDER — TRIAMCINOLONE ACETONIDE 40 MG/ML
40 INJECTION, SUSPENSION INTRA-ARTICULAR; INTRAMUSCULAR ONCE
Status: COMPLETED | OUTPATIENT
Start: 2019-02-25 | End: 2019-02-25

## 2019-02-25 RX ORDER — LIDOCAINE HYDROCHLORIDE 10 MG/ML
2 INJECTION, SOLUTION INFILTRATION; PERINEURAL ONCE
Status: COMPLETED | OUTPATIENT
Start: 2019-02-25 | End: 2019-02-25

## 2019-02-25 RX ADMIN — TRIAMCINOLONE ACETONIDE 40 MG: 40 INJECTION, SUSPENSION INTRA-ARTICULAR; INTRAMUSCULAR at 13:03

## 2019-02-25 RX ADMIN — LIDOCAINE HYDROCHLORIDE 2 ML: 10 INJECTION, SOLUTION INFILTRATION; PERINEURAL at 13:00

## 2019-04-16 ENCOUNTER — HOSPITAL ENCOUNTER (OUTPATIENT)
Age: 79
Discharge: HOME | End: 2019-04-18
Payer: MEDICARE

## 2019-04-16 ENCOUNTER — OFFICE VISIT (OUTPATIENT)
Dept: FAMILY MEDICINE CLINIC | Age: 79
End: 2019-04-16
Payer: MEDICARE

## 2019-04-16 VITALS
RESPIRATION RATE: 18 BRPM | WEIGHT: 142 LBS | HEART RATE: 90 BPM | OXYGEN SATURATION: 95 % | SYSTOLIC BLOOD PRESSURE: 128 MMHG | HEIGHT: 63 IN | BODY MASS INDEX: 25.16 KG/M2 | DIASTOLIC BLOOD PRESSURE: 80 MMHG

## 2019-04-16 DIAGNOSIS — R53.82 CHRONIC FATIGUE: ICD-10-CM

## 2019-04-16 DIAGNOSIS — M54.42 CHRONIC BILATERAL LOW BACK PAIN WITH BILATERAL SCIATICA: ICD-10-CM

## 2019-04-16 DIAGNOSIS — R73.01 IFG (IMPAIRED FASTING GLUCOSE): ICD-10-CM

## 2019-04-16 DIAGNOSIS — M51.16 LUMBAR DISC DISEASE WITH RADICULOPATHY: ICD-10-CM

## 2019-04-16 DIAGNOSIS — G89.29 CHRONIC BILATERAL LOW BACK PAIN WITH BILATERAL SCIATICA: ICD-10-CM

## 2019-04-16 DIAGNOSIS — M19.90 INFLAMMATORY ARTHRITIS: ICD-10-CM

## 2019-04-16 DIAGNOSIS — E55.9 VITAMIN D DEFICIENCY: ICD-10-CM

## 2019-04-16 DIAGNOSIS — Z00.00 ROUTINE GENERAL MEDICAL EXAMINATION AT A HEALTH CARE FACILITY: ICD-10-CM

## 2019-04-16 DIAGNOSIS — R20.2 PARESTHESIA OF BOTH LOWER EXTREMITIES: Primary | ICD-10-CM

## 2019-04-16 DIAGNOSIS — M54.41 CHRONIC BILATERAL LOW BACK PAIN WITH BILATERAL SCIATICA: ICD-10-CM

## 2019-04-16 DIAGNOSIS — R20.2 PARESTHESIA OF BOTH LOWER EXTREMITIES: ICD-10-CM

## 2019-04-16 DIAGNOSIS — R53.83 FATIGUE, UNSPECIFIED TYPE: ICD-10-CM

## 2019-04-16 DIAGNOSIS — D58.2 ELEVATED HEMOGLOBIN (HCC): ICD-10-CM

## 2019-04-16 LAB
ALBUMIN SERPL-MCNC: 4.4 G/DL (ref 3.5–5.2)
ALP BLD-CCNC: 92 U/L (ref 35–104)
ALT SERPL-CCNC: 11 U/L (ref 0–32)
ANION GAP SERPL CALCULATED.3IONS-SCNC: 22 MMOL/L (ref 7–16)
AST SERPL-CCNC: 18 U/L (ref 0–31)
BASOPHILS ABSOLUTE: 0.06 E9/L (ref 0–0.2)
BASOPHILS RELATIVE PERCENT: 0.7 % (ref 0–2)
BILIRUB SERPL-MCNC: 0.4 MG/DL (ref 0–1.2)
BUN BLDV-MCNC: 10 MG/DL (ref 8–23)
CALCIUM SERPL-MCNC: 9.7 MG/DL (ref 8.6–10.2)
CHLORIDE BLD-SCNC: 98 MMOL/L (ref 98–107)
CO2: 21 MMOL/L (ref 22–29)
CREAT SERPL-MCNC: 0.8 MG/DL (ref 0.5–1)
EOSINOPHILS ABSOLUTE: 0.09 E9/L (ref 0.05–0.5)
EOSINOPHILS RELATIVE PERCENT: 1 % (ref 0–6)
FOLATE: 4.4 NG/ML (ref 4.8–24.2)
GFR AFRICAN AMERICAN: >60
GFR NON-AFRICAN AMERICAN: >60 ML/MIN/1.73
GLUCOSE BLD-MCNC: 85 MG/DL (ref 74–99)
HBA1C MFR BLD: 5.2 % (ref 4–5.6)
HCT VFR BLD CALC: 55.4 % (ref 34–48)
HEMOGLOBIN: 18.9 G/DL (ref 11.5–15.5)
IMMATURE GRANULOCYTES #: 0.03 E9/L
IMMATURE GRANULOCYTES %: 0.3 % (ref 0–5)
LYMPHOCYTES ABSOLUTE: 1.46 E9/L (ref 1.5–4)
LYMPHOCYTES RELATIVE PERCENT: 16.2 % (ref 20–42)
MCH RBC QN AUTO: 33.7 PG (ref 26–35)
MCHC RBC AUTO-ENTMCNC: 34.1 % (ref 32–34.5)
MCV RBC AUTO: 98.8 FL (ref 80–99.9)
MONOCYTES ABSOLUTE: 0.58 E9/L (ref 0.1–0.95)
MONOCYTES RELATIVE PERCENT: 6.4 % (ref 2–12)
NEUTROPHILS ABSOLUTE: 6.82 E9/L (ref 1.8–7.3)
NEUTROPHILS RELATIVE PERCENT: 75.4 % (ref 43–80)
PDW BLD-RTO: 12.9 FL (ref 11.5–15)
PLATELET # BLD: 279 E9/L (ref 130–450)
PMV BLD AUTO: 8.8 FL (ref 7–12)
POTASSIUM SERPL-SCNC: 4.1 MMOL/L (ref 3.5–5)
RBC # BLD: 5.61 E12/L (ref 3.5–5.5)
RHEUMATOID FACTOR: 14 IU/ML (ref 0–13)
SODIUM BLD-SCNC: 141 MMOL/L (ref 132–146)
TOTAL PROTEIN: 8 G/DL (ref 6.4–8.3)
TSH SERPL DL<=0.05 MIU/L-ACNC: 2.09 UIU/ML (ref 0.27–4.2)
VITAMIN B-12: 415 PG/ML (ref 211–946)
VITAMIN D 25-HYDROXY: 32 NG/ML (ref 30–100)
WBC # BLD: 9 E9/L (ref 4.5–11.5)

## 2019-04-16 PROCEDURE — 80053 COMPREHEN METABOLIC PANEL: CPT

## 2019-04-16 PROCEDURE — 84443 ASSAY THYROID STIM HORMONE: CPT

## 2019-04-16 PROCEDURE — 86200 CCP ANTIBODY: CPT

## 2019-04-16 PROCEDURE — 4040F PNEUMOC VAC/ADMIN/RCVD: CPT | Performed by: FAMILY MEDICINE

## 2019-04-16 PROCEDURE — 82306 VITAMIN D 25 HYDROXY: CPT

## 2019-04-16 PROCEDURE — 83036 HEMOGLOBIN GLYCOSYLATED A1C: CPT

## 2019-04-16 PROCEDURE — 82746 ASSAY OF FOLIC ACID SERUM: CPT

## 2019-04-16 PROCEDURE — G0438 PPPS, INITIAL VISIT: HCPCS | Performed by: FAMILY MEDICINE

## 2019-04-16 PROCEDURE — 36415 COLL VENOUS BLD VENIPUNCTURE: CPT

## 2019-04-16 PROCEDURE — 86431 RHEUMATOID FACTOR QUANT: CPT

## 2019-04-16 PROCEDURE — 82607 VITAMIN B-12: CPT

## 2019-04-16 PROCEDURE — 85025 COMPLETE CBC W/AUTO DIFF WBC: CPT

## 2019-04-16 RX ORDER — GABAPENTIN 300 MG/1
300 CAPSULE ORAL NIGHTLY
Qty: 30 CAPSULE | Refills: 5 | Status: SHIPPED | OUTPATIENT
Start: 2019-04-16 | End: 2019-06-19

## 2019-04-16 RX ORDER — GABAPENTIN 100 MG/1
100 CAPSULE ORAL NIGHTLY
Qty: 7 CAPSULE | Refills: 0 | Status: SHIPPED | OUTPATIENT
Start: 2019-04-16 | End: 2019-05-28

## 2019-04-16 ASSESSMENT — LIFESTYLE VARIABLES: HOW OFTEN DO YOU HAVE A DRINK CONTAINING ALCOHOL: 0

## 2019-04-16 ASSESSMENT — PATIENT HEALTH QUESTIONNAIRE - PHQ9
SUM OF ALL RESPONSES TO PHQ9 QUESTIONS 1 & 2: 0
1. LITTLE INTEREST OR PLEASURE IN DOING THINGS: 0
2. FEELING DOWN, DEPRESSED OR HOPELESS: 0
SUM OF ALL RESPONSES TO PHQ QUESTIONS 1-9: 0
SUM OF ALL RESPONSES TO PHQ QUESTIONS 1-9: 0

## 2019-04-16 NOTE — LETTER
[facility] [address]  Phone: [phone]  Fax: [phone], [place]        April 16, 2019     Patient: Rafael Reeder   YOB: 1940   Date of Visit: 4/16/2019       To Whom It May Concern: It is my medical opinion that Clary Cowden requires a disability parking placard for the following reasons:  She cannot walk 200 feet without stopping to rest.  Duration of need: permanent    If you have any questions or concerns, please don't hesitate to call.     Sincerely,        Enoc Cornell, DO

## 2019-04-17 NOTE — PROGRESS NOTES
kg)   Height: 5' 3\" (1.6 m)     Body mass index is 25.15 kg/m². Based upon direct observation of the patient, evaluation of cognition reveals recent and remote memory intact. Patient's complete Health Risk Assessment and screening values have been reviewed and are found in Flowsheets. The following problems were reviewed today and where indicated follow up appointments were made and/or referrals ordered. Positive Risk Factor Screenings with Interventions:     Substance Abuse:  Social History     Tobacco History     Smoking Status  Current Every Day Smoker Smoking Start Date  7/13/1955 Smoking Frequency  2 packs/day for 20 years (36 pk yrs) Smoking Tobacco Type  Cigarettes    Smokeless Tobacco Use  Never Used          Alcohol History     Alcohol Use Status  No          Drug Use     Drug Use Status  No          Sexual Activity     Sexually Active  Never               Audit Questionnaire: Screen for Alcohol Misuse  How often do you have a drink containing alcohol?: Never  Substance Abuse Interventions:  · Tobacco abuse:  tobacco cessation tips and resources provided    General Health:  General  In general, how would you say your health is?: Good  In the past 7 days, have you experienced any of the following?  New or Increased Pain, New or Increased Fatigue, Loneliness, Social Isolation, Stress or Anger?: (!) (P) New or Increased Pain, New or Increased Fatigue  Do you get the social and emotional support that you need?: (P) Yes  Do you have a Living Will?: (P) Yes  General Health Risk Interventions:  · Fatigue: regular exercise recommended- 3-5 times per week, 30-45 minutes per session    Health Habits/Nutrition:  Health Habits/Nutrition  Do you exercise for at least 20 minutes 2-3 times per week?: (!) (P) No  Have you lost any weight without trying in the past 3 months?: (P) No  Do you eat fewer than 2 meals per day?: (P) No  Have you seen a dentist within the past year?: (!) (P) No  Body mass index is

## 2019-04-19 LAB — CCP IGG ANTIBODIES: 10 UNITS (ref 0–19)

## 2019-04-19 RX ORDER — FOLIC ACID 1 MG/1
1 TABLET ORAL DAILY
Qty: 30 TABLET | Refills: 3 | Status: SHIPPED | OUTPATIENT
Start: 2019-04-19 | End: 2019-06-11

## 2019-04-23 ENCOUNTER — OFFICE VISIT (OUTPATIENT)
Dept: ORTHOPEDIC SURGERY | Age: 79
End: 2019-04-23
Payer: MEDICARE

## 2019-04-23 VITALS
RESPIRATION RATE: 20 BRPM | WEIGHT: 142 LBS | HEIGHT: 62 IN | BODY MASS INDEX: 26.13 KG/M2 | DIASTOLIC BLOOD PRESSURE: 88 MMHG | TEMPERATURE: 98.5 F | HEART RATE: 82 BPM | SYSTOLIC BLOOD PRESSURE: 148 MMHG

## 2019-04-23 DIAGNOSIS — G56.03 BILATERAL CARPAL TUNNEL SYNDROME: Primary | ICD-10-CM

## 2019-04-23 PROCEDURE — G8427 DOCREV CUR MEDS BY ELIG CLIN: HCPCS | Performed by: ORTHOPAEDIC SURGERY

## 2019-04-23 PROCEDURE — 4040F PNEUMOC VAC/ADMIN/RCVD: CPT | Performed by: ORTHOPAEDIC SURGERY

## 2019-04-23 PROCEDURE — 1090F PRES/ABSN URINE INCON ASSESS: CPT | Performed by: ORTHOPAEDIC SURGERY

## 2019-04-23 PROCEDURE — G8419 CALC BMI OUT NRM PARAM NOF/U: HCPCS | Performed by: ORTHOPAEDIC SURGERY

## 2019-04-23 PROCEDURE — 99215 OFFICE O/P EST HI 40 MIN: CPT | Performed by: ORTHOPAEDIC SURGERY

## 2019-04-23 PROCEDURE — 4004F PT TOBACCO SCREEN RCVD TLK: CPT | Performed by: ORTHOPAEDIC SURGERY

## 2019-04-23 PROCEDURE — G8400 PT W/DXA NO RESULTS DOC: HCPCS | Performed by: ORTHOPAEDIC SURGERY

## 2019-04-23 PROCEDURE — 1123F ACP DISCUSS/DSCN MKR DOCD: CPT | Performed by: ORTHOPAEDIC SURGERY

## 2019-04-23 NOTE — PROGRESS NOTES
Postoperative course explained to the patient. Patient like to proceed. All questions answered. I explained the risks, benefits, alternatives and complications of surgery with the patient including but not limited to the risks of infection, possible damage to nerves, vessels, or tendons, stiffness, loss of range of motion, scar sensitivity, wound healing complications, worsening symptoms, possible need for therapy, as well as the possible need further surgery and unanticipated complications. The patient voiced understanding and all questions were answered. The patient elected to proceed with surgical intervention. I have seen and evaluated the patient and agree with the above assessment and plan on today's visit. I have performed the key components of the history and physical examination with significant findings of bilateral CTS. I concur with the findings and plan as documented.     Yecenia Schroeder MD  4/23/2019

## 2019-04-29 ENCOUNTER — PREP FOR PROCEDURE (OUTPATIENT)
Dept: ORTHOPEDIC SURGERY | Age: 79
End: 2019-04-29

## 2019-04-29 RX ORDER — SODIUM CHLORIDE 9 MG/ML
INJECTION, SOLUTION INTRAVENOUS CONTINUOUS
Status: CANCELLED | OUTPATIENT
Start: 2019-04-29

## 2019-04-29 RX ORDER — SODIUM CHLORIDE 0.9 % (FLUSH) 0.9 %
10 SYRINGE (ML) INJECTION EVERY 12 HOURS SCHEDULED
Status: CANCELLED | OUTPATIENT
Start: 2019-04-29

## 2019-04-29 RX ORDER — SODIUM CHLORIDE 0.9 % (FLUSH) 0.9 %
10 SYRINGE (ML) INJECTION PRN
Status: CANCELLED | OUTPATIENT
Start: 2019-04-29

## 2019-05-21 ENCOUNTER — PREP FOR PROCEDURE (OUTPATIENT)
Dept: ORTHOPEDIC SURGERY | Age: 79
End: 2019-05-21

## 2019-05-21 RX ORDER — SODIUM CHLORIDE 9 MG/ML
INJECTION, SOLUTION INTRAVENOUS CONTINUOUS
Status: CANCELLED | OUTPATIENT
Start: 2019-05-21

## 2019-05-21 RX ORDER — SODIUM CHLORIDE 0.9 % (FLUSH) 0.9 %
10 SYRINGE (ML) INJECTION PRN
Status: CANCELLED | OUTPATIENT
Start: 2019-05-21

## 2019-05-21 RX ORDER — SODIUM CHLORIDE 0.9 % (FLUSH) 0.9 %
10 SYRINGE (ML) INJECTION EVERY 12 HOURS SCHEDULED
Status: CANCELLED | OUTPATIENT
Start: 2019-05-21

## 2019-05-28 ENCOUNTER — OFFICE VISIT (OUTPATIENT)
Dept: FAMILY MEDICINE CLINIC | Age: 79
End: 2019-05-28
Payer: MEDICARE

## 2019-05-28 VITALS
OXYGEN SATURATION: 95 % | TEMPERATURE: 98.9 F | RESPIRATION RATE: 16 BRPM | SYSTOLIC BLOOD PRESSURE: 128 MMHG | HEIGHT: 64 IN | HEART RATE: 84 BPM | WEIGHT: 141.2 LBS | DIASTOLIC BLOOD PRESSURE: 66 MMHG | BODY MASS INDEX: 24.11 KG/M2

## 2019-05-28 DIAGNOSIS — M51.16 LUMBAR DISC DISEASE WITH RADICULOPATHY: Primary | ICD-10-CM

## 2019-05-28 PROCEDURE — 96372 THER/PROPH/DIAG INJ SC/IM: CPT | Performed by: FAMILY MEDICINE

## 2019-05-28 PROCEDURE — 1090F PRES/ABSN URINE INCON ASSESS: CPT | Performed by: FAMILY MEDICINE

## 2019-05-28 PROCEDURE — G8427 DOCREV CUR MEDS BY ELIG CLIN: HCPCS | Performed by: FAMILY MEDICINE

## 2019-05-28 PROCEDURE — 4004F PT TOBACCO SCREEN RCVD TLK: CPT | Performed by: FAMILY MEDICINE

## 2019-05-28 PROCEDURE — 99214 OFFICE O/P EST MOD 30 MIN: CPT | Performed by: FAMILY MEDICINE

## 2019-05-28 PROCEDURE — G8400 PT W/DXA NO RESULTS DOC: HCPCS | Performed by: FAMILY MEDICINE

## 2019-05-28 PROCEDURE — 4040F PNEUMOC VAC/ADMIN/RCVD: CPT | Performed by: FAMILY MEDICINE

## 2019-05-28 PROCEDURE — 1123F ACP DISCUSS/DSCN MKR DOCD: CPT | Performed by: FAMILY MEDICINE

## 2019-05-28 PROCEDURE — G8420 CALC BMI NORM PARAMETERS: HCPCS | Performed by: FAMILY MEDICINE

## 2019-05-28 RX ORDER — TRAMADOL HYDROCHLORIDE 50 MG/1
50 TABLET ORAL EVERY 8 HOURS PRN
Qty: 90 TABLET | Refills: 4 | Status: ON HOLD | OUTPATIENT
Start: 2019-05-28 | End: 2019-06-14 | Stop reason: HOSPADM

## 2019-05-28 RX ORDER — GABAPENTIN 300 MG/1
600 CAPSULE ORAL 2 TIMES DAILY
Qty: 120 CAPSULE | Refills: 5 | Status: SHIPPED | OUTPATIENT
Start: 2019-05-28 | End: 2019-06-27 | Stop reason: ALTCHOICE

## 2019-05-28 RX ORDER — TRIAMCINOLONE ACETONIDE 40 MG/ML
40 INJECTION, SUSPENSION INTRA-ARTICULAR; INTRAMUSCULAR ONCE
Status: COMPLETED | OUTPATIENT
Start: 2019-05-28 | End: 2019-05-28

## 2019-05-28 RX ORDER — KETOROLAC TROMETHAMINE 30 MG/ML
60 INJECTION, SOLUTION INTRAMUSCULAR; INTRAVENOUS ONCE
Status: COMPLETED | OUTPATIENT
Start: 2019-05-28 | End: 2019-05-28

## 2019-05-28 RX ADMIN — TRIAMCINOLONE ACETONIDE 40 MG: 40 INJECTION, SUSPENSION INTRA-ARTICULAR; INTRAMUSCULAR at 13:42

## 2019-05-28 RX ADMIN — KETOROLAC TROMETHAMINE 60 MG: 30 INJECTION, SOLUTION INTRAMUSCULAR; INTRAVENOUS at 13:41

## 2019-05-30 ASSESSMENT — ENCOUNTER SYMPTOMS
ABDOMINAL PAIN: 0
EYE PAIN: 0
ABDOMINAL DISTENTION: 0
CHOKING: 0
EYE ITCHING: 0
COLOR CHANGE: 0
TROUBLE SWALLOWING: 0
SINUS PRESSURE: 0
EYE DISCHARGE: 0
BLOOD IN STOOL: 0
ANAL BLEEDING: 0
COUGH: 0
PHOTOPHOBIA: 0
APNEA: 0
RECTAL PAIN: 0
SORE THROAT: 0
CHEST TIGHTNESS: 0
CONSTIPATION: 0
WHEEZING: 0
STRIDOR: 0
DIARRHEA: 0
EYE REDNESS: 0
VOICE CHANGE: 0
VOMITING: 0
RHINORRHEA: 0
BACK PAIN: 1
NAUSEA: 0
FACIAL SWELLING: 0
SHORTNESS OF BREATH: 0

## 2019-05-30 NOTE — PROGRESS NOTES
Adilene Jackson is a 78 y.o. female  . Subjective:      Back Pain   This is a chronic problem. The current episode started more than 1 year ago. The problem occurs daily. The problem has been gradually worsening since onset. The pain is present in the lumbar spine and sacro-iliac. The quality of the pain is described as aching, burning and cramping. The pain radiates to the left knee, left thigh, right foot, right knee, right thigh and left foot. The pain is at a severity of 7/10. The pain is severe. The pain is the same all the time. The symptoms are aggravated by bending, position, standing, sitting and twisting. Stiffness is present all day. Associated symptoms include leg pain, numbness and paresthesias. Pertinent negatives include no abdominal pain, chest pain, dysuria, fever, headaches, pelvic pain or weakness. She has tried NSAIDs, muscle relaxant and analgesics for the symptoms. Other   This is a chronic (paresthesia bilateral lower extremity, caused most likely by back ) problem. The current episode started more than 1 month ago. The problem occurs daily. The problem has been rapidly worsening. Associated symptoms include arthralgias, myalgias and numbness. Pertinent negatives include no abdominal pain, chest pain, chills, congestion, coughing, diaphoresis, fatigue, fever, headaches, joint swelling, nausea, neck pain, rash, sore throat, vomiting or weakness. The symptoms are aggravated by walking and standing. Treatments tried: gabapentin. The treatment provided no relief. Review of Systems   Constitutional: Negative for activity change, appetite change, chills, diaphoresis, fatigue, fever and unexpected weight change. HENT: Negative for congestion, dental problem, drooling, ear discharge, ear pain, facial swelling, hearing loss, mouth sores, nosebleeds, postnasal drip, rhinorrhea, sinus pressure, sneezing, sore throat, tinnitus, trouble swallowing and voice change.     Eyes: Negative for photophobia, pain, discharge, redness, itching and visual disturbance. Respiratory: Negative for apnea, cough, choking, chest tightness, shortness of breath, wheezing and stridor. Cardiovascular: Negative for chest pain, palpitations and leg swelling. Gastrointestinal: Negative for abdominal distention, abdominal pain, anal bleeding, blood in stool, constipation, diarrhea, nausea, rectal pain and vomiting. Endocrine: Negative for cold intolerance, heat intolerance, polydipsia, polyphagia and polyuria. Genitourinary: Negative for decreased urine volume, difficulty urinating, dyspareunia, dysuria, enuresis, flank pain, frequency, genital sores, hematuria, menstrual problem, pelvic pain, urgency, vaginal bleeding, vaginal discharge and vaginal pain. Musculoskeletal: Positive for arthralgias, back pain, gait problem and myalgias. Negative for joint swelling, neck pain and neck stiffness. Skin: Negative for color change, pallor, rash and wound. Allergic/Immunologic: Negative for environmental allergies, food allergies and immunocompromised state. Neurological: Positive for numbness and paresthesias. Negative for dizziness, tremors, seizures, syncope, facial asymmetry, speech difficulty, weakness, light-headedness and headaches. Hematological: Negative for adenopathy. Does not bruise/bleed easily. Psychiatric/Behavioral: Negative for agitation, behavioral problems, confusion, decreased concentration, dysphoric mood, hallucinations, self-injury, sleep disturbance and suicidal ideas. The patient is not nervous/anxious and is not hyperactive.         Past Medical History:   Diagnosis Date    Anxiety     Bilateral carpal tunnel syndrome 8/6/2018    Cataract of both eyes 8/6/2018    Chronic fatigue 8/6/2018    Elevated hemoglobin (HCC) 8/6/2018    GERD (gastroesophageal reflux disease)     IFG (impaired fasting glucose) 8/6/2018       Social History     Socioeconomic History    Marital status:      Spouse name: Not on file    Number of children: Not on file    Years of education: Not on file    Highest education level: Not on file   Occupational History    Not on file   Social Needs    Financial resource strain: Not on file    Food insecurity:     Worry: Not on file     Inability: Not on file    Transportation needs:     Medical: Not on file     Non-medical: Not on file   Tobacco Use    Smoking status: Current Every Day Smoker     Packs/day: 2.00     Years: 20.00     Pack years: 40.00     Types: Cigarettes     Start date: 7/13/1955    Smokeless tobacco: Never Used   Substance and Sexual Activity    Alcohol use: No    Drug use: No    Sexual activity: Never   Lifestyle    Physical activity:     Days per week: Not on file     Minutes per session: Not on file    Stress: Not on file   Relationships    Social connections:     Talks on phone: Not on file     Gets together: Not on file     Attends Jehovah's witness service: Not on file     Active member of club or organization: Not on file     Attends meetings of clubs or organizations: Not on file     Relationship status: Not on file    Intimate partner violence:     Fear of current or ex partner: Not on file     Emotionally abused: Not on file     Physically abused: Not on file     Forced sexual activity: Not on file   Other Topics Concern    Not on file   Social History Narrative    Not on file       Family History   Problem Relation Age of Onset    Cancer Father        Current Outpatient Medications on File Prior to Visit   Medication Sig Dispense Refill    gabapentin (NEURONTIN) 300 MG capsule Take 1 capsule by mouth nightly for 30 days. Intended supply: 90 days 30 capsule 5    folic acid (FOLVITE) 1 MG tablet Take 1 tablet by mouth daily 30 tablet 3     No current facility-administered medications on file prior to visit.         No Known Allergies    I have reviewedher allergies, medications, problem list, medical, social and family history and have updated as needed in the electronic medical record. Objective:     Physical Exam   Constitutional: She is oriented to person, place, and time. She appears well-developed and well-nourished. No distress. HENT:   Head: Normocephalic and atraumatic. Right Ear: External ear normal.   Left Ear: External ear normal.   Nose: Nose normal.   Mouth/Throat: Oropharynx is clear and moist. No oropharyngeal exudate. Eyes: Pupils are equal, round, and reactive to light. Conjunctivae and EOM are normal. Right eye exhibits no discharge. Left eye exhibits no discharge. No scleral icterus. Neck: Normal range of motion. Neck supple. No JVD present. No tracheal deviation present. No thyromegaly present. Cardiovascular: Normal rate, regular rhythm, normal heart sounds and intact distal pulses. Exam reveals no gallop and no friction rub. No murmur heard. Pulmonary/Chest: Effort normal and breath sounds normal. No stridor. No respiratory distress. She has no wheezes. She has no rales. She exhibits no tenderness. Abdominal: Soft. Bowel sounds are normal. She exhibits no distension and no mass. There is no tenderness. There is no rebound and no guarding. Genitourinary:   Genitourinary Comments: Will follow with own gynecologist for gynecological and breast care. Musculoskeletal:   Increased spasm L1 to S1 with decreased ROM. + straight leg raising and contralateral straight leg raising tests B/L   Lymphadenopathy:     She has no cervical adenopathy. Neurological: She is alert and oriented to person, place, and time. She has normal reflexes. She displays normal reflexes. No cranial nerve deficit. She exhibits normal muscle tone. Coordination normal.   Skin: Skin is warm and dry. No rash noted. She is not diaphoretic. No erythema. No pallor. Psychiatric: She has a normal mood and affect. Her behavior is normal. Judgment and thought content normal.   Nursing note and vitals reviewed.       Assessment / Plan:   Iveth Main was seen today for results and difficulty walking. Diagnoses and all orders for this visit:    Lumbar disc disease with radiculopathy  -     gabapentin (NEURONTIN) 300 MG capsule; Take 2 capsules by mouth 2 times daily. Intended supply: 90 days  -     traMADol (ULTRAM) 50 MG tablet; Take 1 tablet by mouth every 8 hours as needed for Pain for up to 30 days.  -     MRI LUMBAR SPINE [address]; Future  -     Christopher Ortega MD, Pain Medicine, [place]  -     ketorolac (TORADOL) injection 60 mg  -     triamcinolone acetonide (KENALOG-40) injection 40 mg        Willfollow with own gynecologist for gynecological and breast care. Reviewed health maintenance report. Patient is aware of deficiencies and suggested preventative tests.

## 2019-06-11 NOTE — PROGRESS NOTES
[facility] PRE-ADMISSION TESTING INSTRUCTIONS    The Preadmission Testing patient is instructed accordingly using the following criteria (check applicable):    ARRIVAL INSTRUCTIONS:  [x] Parking the day of Surgery is located in the Main Entrance lot. Upon entering the door, make an immediate right to the surgery reception desk    [] [id number] is available Monday through Friday 6 am to 6 pm    [x] Bring photo ID and insurance card    [] Bring in a copy of Living will or Durable Power of  papers. [x] Please be sure to arrange for responsible adult to provide transportation to and from the hospital    [x] Please arrange for responsible adult to be with you for the 24 hour period post procedure due to having anesthesia      GENERAL INSTRUCTIONS:    [x] Nothing by mouth after midnight, including gum, candy, mints or water    [x] You may brush your teeth, but do not swallow any water    [x] Take medications as instructed with 1-2 oz of water    [] Stop herbal supplements and vitamins 5 days prior to procedure    [] Follow preop dosing of blood thinners per physician instructions    [] Take 1/2 dose of evening insulin, but no insulin after midnight    [] No oral diabetic medications after midnight    [] If diabetic and have low blood sugar or feel symptomatic, take 1-2oz apple juice only    [] Bring inhalers day of surgery    [] Bring C-PAP/ Bi-Pap day of surgery    [] Bring urine specimen day of surgery    [x] Shower or bath with soap, lather and rinse well, AM of Surgery, no lotion, powders or creams    [] Follow bowel prep as instructed per surgeon    [x] No tobacco products within 24 hours of surgery     [] No alcohol or illegal drug use within 24 hours of surgery.     [x] Jewelry, body piercing's, eyeglasses, contact lenses and dentures are not permitted into surgery (bring cases)      [x] Please do not wear any nail polish, make up or hair products on the day of surgery    [x] If not already done, you can expect a call from registration    [x] You can expect a call the business day prior to procedure to notify you if your arrival time changes    [x] If you receive a survey after surgery we would greatly appreciate your comments    [] Parent/guardian of a minor must accompany their child and remain on the premises  the entire time they are under our care     [] Pediatric patients may bring favorite toy, blanket or comfort item with them    [] A caregiver or family member must remain with the patient during their stay if they are mentally handicapped, have dementia, disoriented or unable to use a call light or would be a safety concern if left unattended    [x] Please notify surgeon if you develop any illness between now and time of surgery (cold, cough, sore throat, fever, nausea, vomiting) or any signs of infections  including skin, wounds, and dental.    [x]  The Outpatient Pharmacy is available to fill your prescription here on your day of surgery, ask your preop nurse for details    [] Other instructions  EDUCATIONAL MATERIALS PROVIDED:    [] PAT Preoperative Education Packet/Booklet     [] Medication List    [] Fluoroscopy Information Pamphlet    [] Transfusion bracelet applied with instructions    [] Joint replacement video reviewed    [] Shower with soap, lather and rinse well, and use CHG wipes provided the evening before surgery as instructed

## 2019-06-13 ENCOUNTER — ANESTHESIA EVENT (OUTPATIENT)
Dept: OPERATING ROOM | Age: 79
End: 2019-06-13
Payer: MEDICARE

## 2019-06-14 ENCOUNTER — HOSPITAL ENCOUNTER (OUTPATIENT)
Age: 79
Setting detail: OUTPATIENT SURGERY
Discharge: HOME | End: 2019-06-14
Attending: ORTHOPAEDIC SURGERY | Admitting: ORTHOPAEDIC SURGERY
Payer: MEDICARE

## 2019-06-14 ENCOUNTER — ANESTHESIA (OUTPATIENT)
Dept: OPERATING ROOM | Age: 79
End: 2019-06-14
Payer: MEDICARE

## 2019-06-14 VITALS
TEMPERATURE: 97.3 F | WEIGHT: 141 LBS | HEART RATE: 96 BPM | BODY MASS INDEX: 24.07 KG/M2 | DIASTOLIC BLOOD PRESSURE: 59 MMHG | RESPIRATION RATE: 14 BRPM | HEIGHT: 64 IN | OXYGEN SATURATION: 95 % | SYSTOLIC BLOOD PRESSURE: 117 MMHG

## 2019-06-14 VITALS — SYSTOLIC BLOOD PRESSURE: 136 MMHG | DIASTOLIC BLOOD PRESSURE: 69 MMHG | OXYGEN SATURATION: 99 %

## 2019-06-14 DIAGNOSIS — G56.01 RIGHT CARPAL TUNNEL SYNDROME: Primary | ICD-10-CM

## 2019-06-14 PROCEDURE — 2500000003 HC RX 250 WO HCPCS: Performed by: NURSE ANESTHETIST, CERTIFIED REGISTERED

## 2019-06-14 PROCEDURE — 29848 WRIST ENDOSCOPY/SURGERY: CPT | Performed by: ORTHOPAEDIC SURGERY

## 2019-06-14 PROCEDURE — 7100000011 HC PHASE II RECOVERY - ADDTL 15 MIN: Performed by: ORTHOPAEDIC SURGERY

## 2019-06-14 PROCEDURE — 3600000002 HC SURGERY LEVEL 2 BASE: Performed by: ORTHOPAEDIC SURGERY

## 2019-06-14 PROCEDURE — 2709999900 HC NON-CHARGEABLE SUPPLY: Performed by: ORTHOPAEDIC SURGERY

## 2019-06-14 PROCEDURE — 2580000003 HC RX 258: Performed by: NURSE ANESTHETIST, CERTIFIED REGISTERED

## 2019-06-14 PROCEDURE — 3600000012 HC SURGERY LEVEL 2 ADDTL 15MIN: Performed by: ORTHOPAEDIC SURGERY

## 2019-06-14 PROCEDURE — 3700000001 HC ADD 15 MINUTES (ANESTHESIA): Performed by: ORTHOPAEDIC SURGERY

## 2019-06-14 PROCEDURE — 2720000010 HC SURG SUPPLY STERILE: Performed by: ORTHOPAEDIC SURGERY

## 2019-06-14 PROCEDURE — 7100000010 HC PHASE II RECOVERY - FIRST 15 MIN: Performed by: ORTHOPAEDIC SURGERY

## 2019-06-14 PROCEDURE — 6360000002 HC RX W HCPCS: Performed by: PHYSICIAN ASSISTANT

## 2019-06-14 PROCEDURE — 6360000002 HC RX W HCPCS: Performed by: NURSE ANESTHETIST, CERTIFIED REGISTERED

## 2019-06-14 PROCEDURE — 3700000000 HC ANESTHESIA ATTENDED CARE: Performed by: ORTHOPAEDIC SURGERY

## 2019-06-14 PROCEDURE — 2500000003 HC RX 250 WO HCPCS: Performed by: ORTHOPAEDIC SURGERY

## 2019-06-14 RX ORDER — PROPOFOL 10 MG/ML
INJECTION, EMULSION INTRAVENOUS PRN
Status: DISCONTINUED | OUTPATIENT
Start: 2019-06-14 | End: 2019-06-14 | Stop reason: SDUPTHER

## 2019-06-14 RX ORDER — FENTANYL CITRATE 50 UG/ML
INJECTION, SOLUTION INTRAMUSCULAR; INTRAVENOUS PRN
Status: DISCONTINUED | OUTPATIENT
Start: 2019-06-14 | End: 2019-06-14 | Stop reason: SDUPTHER

## 2019-06-14 RX ORDER — SODIUM CHLORIDE 9 MG/ML
INJECTION, SOLUTION INTRAVENOUS CONTINUOUS
Status: DISCONTINUED | OUTPATIENT
Start: 2019-06-14 | End: 2019-06-14 | Stop reason: HOSPADM

## 2019-06-14 RX ORDER — SODIUM CHLORIDE 9 MG/ML
INJECTION, SOLUTION INTRAVENOUS CONTINUOUS PRN
Status: DISCONTINUED | OUTPATIENT
Start: 2019-06-14 | End: 2019-06-14 | Stop reason: SDUPTHER

## 2019-06-14 RX ORDER — LIDOCAINE HYDROCHLORIDE AND EPINEPHRINE 10; 10 MG/ML; UG/ML
INJECTION, SOLUTION INFILTRATION; PERINEURAL PRN
Status: DISCONTINUED | OUTPATIENT
Start: 2019-06-14 | End: 2019-06-14 | Stop reason: ALTCHOICE

## 2019-06-14 RX ORDER — SODIUM CHLORIDE 0.9 % (FLUSH) 0.9 %
10 SYRINGE (ML) INJECTION PRN
Status: DISCONTINUED | OUTPATIENT
Start: 2019-06-14 | End: 2019-06-14 | Stop reason: HOSPADM

## 2019-06-14 RX ORDER — LIDOCAINE HYDROCHLORIDE 20 MG/ML
INJECTION, SOLUTION EPIDURAL; INFILTRATION; INTRACAUDAL; PERINEURAL PRN
Status: DISCONTINUED | OUTPATIENT
Start: 2019-06-14 | End: 2019-06-14 | Stop reason: SDUPTHER

## 2019-06-14 RX ORDER — SODIUM CHLORIDE 0.9 % (FLUSH) 0.9 %
10 SYRINGE (ML) INJECTION EVERY 12 HOURS SCHEDULED
Status: DISCONTINUED | OUTPATIENT
Start: 2019-06-14 | End: 2019-06-14 | Stop reason: HOSPADM

## 2019-06-14 RX ORDER — PROPOFOL 10 MG/ML
INJECTION, EMULSION INTRAVENOUS CONTINUOUS PRN
Status: DISCONTINUED | OUTPATIENT
Start: 2019-06-14 | End: 2019-06-14 | Stop reason: SDUPTHER

## 2019-06-14 RX ORDER — HYDROCODONE BITARTRATE AND ACETAMINOPHEN 5; 325 MG/1; MG/1
2 TABLET ORAL PRN
Status: DISCONTINUED | OUTPATIENT
Start: 2019-06-14 | End: 2019-06-14 | Stop reason: HOSPADM

## 2019-06-14 RX ORDER — HYDROCODONE BITARTRATE AND ACETAMINOPHEN 5; 325 MG/1; MG/1
1 TABLET ORAL PRN
Status: DISCONTINUED | OUTPATIENT
Start: 2019-06-14 | End: 2019-06-14 | Stop reason: HOSPADM

## 2019-06-14 RX ORDER — HYDROCODONE BITARTRATE AND ACETAMINOPHEN 5; 325 MG/1; MG/1
1 TABLET ORAL EVERY 6 HOURS PRN
Qty: 20 TABLET | Refills: 0 | Status: SHIPPED | OUTPATIENT
Start: 2019-06-14 | End: 2019-06-19 | Stop reason: SINTOL

## 2019-06-14 RX ADMIN — PROPOFOL 100 MG: 10 INJECTION, EMULSION INTRAVENOUS at 09:38

## 2019-06-14 RX ADMIN — FENTANYL CITRATE 100 MCG: 50 INJECTION, SOLUTION INTRAMUSCULAR; INTRAVENOUS at 09:36

## 2019-06-14 RX ADMIN — SODIUM CHLORIDE: 9 INJECTION, SOLUTION INTRAVENOUS at 09:34

## 2019-06-14 RX ADMIN — Medication 2 G: at 09:35

## 2019-06-14 RX ADMIN — PROPOFOL 50 MCG/KG/MIN: 10 INJECTION, EMULSION INTRAVENOUS at 09:38

## 2019-06-14 RX ADMIN — PROPOFOL 50 MG: 10 INJECTION, EMULSION INTRAVENOUS at 09:39

## 2019-06-14 RX ADMIN — LIDOCAINE HYDROCHLORIDE 40 MG: 20 INJECTION, SOLUTION EPIDURAL; INFILTRATION; INTRACAUDAL; PERINEURAL at 09:38

## 2019-06-14 ASSESSMENT — PULMONARY FUNCTION TESTS: PIF_VALUE: 0

## 2019-06-14 ASSESSMENT — PAIN SCALES - GENERAL: PAINLEVEL_OUTOF10: 0

## 2019-06-14 ASSESSMENT — PAIN DESCRIPTION - LOCATION: LOCATION: WRIST

## 2019-06-14 ASSESSMENT — PAIN - FUNCTIONAL ASSESSMENT: PAIN_FUNCTIONAL_ASSESSMENT: 0-10

## 2019-06-14 ASSESSMENT — PAIN DESCRIPTION - ORIENTATION: ORIENTATION: RIGHT

## 2019-06-14 ASSESSMENT — PAIN DESCRIPTION - DESCRIPTORS: DESCRIPTORS: ACHING

## 2019-06-14 NOTE — BRIEF OP NOTE
Brief Postoperative Note  ______________________________________________________________    Patient: Erica Cox  YOB: 1940  MRN: [id number]  Date of Procedure: 6/14/2019    Pre-Op Diagnosis: RIGHT CARPAL TUNNEL SYNDROME    Post-Op Diagnosis: Same       Procedure(s):  RIGHT ENDOSCOPIC CARPAL TUNNEL RELEASE    Anesthesia: Monitor Anesthesia Care    Surgeon(s):  Nelsy West MD    Assistant: none    Estimated Blood Loss (mL): less than 50     Complications: None    Specimens:   * No specimens in log *    Implants:  * No implants in log *      Drains: * No LDAs found *    Findings: see op note    Nelsy West MD  Date: 6/14/2019  Time: 9:51 AM

## 2019-06-14 NOTE — H&P
and elbow with good range of motion. Negative Tinel's of the cubital tunnel, positive Tinel's of the carpal tunnel, positive Gabriella's, negative Finkelstein's, negative CMC grind, negative tenderness of the A1 pulleys no active triggering. Full flexion of the fingers. Extension of the index finger demonstrates a 35° PIP joint flexion contracture. This chronic per the patient. Remaining digits with full extension. - wartenbergs and cross finger testing, APB strength 5/5. - atrophy. Median, ulnar, radial nerves intact light touch. Brisk capillary refill. Gross motor 5 out of 5.     Left upper extremity: Nontender about shoulder and elbow with good range of motion. Negative Tinel's of the cubital tunnel, positive Tinel's of the carpal tunnel, positive Gabriella's, negative Finkelstein's, negative CMC grind, negative tenderness of the A1 pulleys no active triggering. Full flexion of the fingers. Full extension. - wartenbergs and cross finger testing, APB strength 5/5. - atrophy. Median, ulnar, radial nerves intact light touch. Brisk capillary refill. Gross motor 5 out of 5.     DATA:    CBC:         Lab Results   Component Value Date     WBC 9.0 04/16/2019     RBC 5.61 04/16/2019     HGB 18.9 04/16/2019     HCT 55.4 04/16/2019     MCV 98.8 04/16/2019     MCH 33.7 04/16/2019     MCHC 34.1 04/16/2019     RDW 12.9 04/16/2019      04/16/2019     MPV 8.8 04/16/2019      PT/INR:  No results found for: PROTIME, INR     Radiology Review:  X-rays of bilateral wrists were reviewed from 10/23/2018. 4 views: AP, lateral, oblique, scaphoid view demonstrate Ernesto Moan stage IV thumb CMC joint arthritis. No acute fractures or dislocations. Impression: Bilateral thumb CMC joint arthritis     IMPRESSION:  · Bilateral carpal tunnel syndrome     PLAN:  Discussed findings with the patient. Discussed conservative and surgical management with the patient. Patient failed conservative management with cortisone injections.  Recommended surgical management. We will plan for a right endoscopic carpal tunnel release followed by a left endoscopic carpal tunnel release a few weeks later. Postoperative course explained to the patient. Patient like to proceed. All questions answered.     I explained the risks, benefits, alternatives and complications of surgery with the patient including but not limited to the risks of infection, possible damage to nerves, vessels, or tendons, stiffness, loss of range of motion, scar sensitivity, wound healing complications, worsening symptoms, possible need for therapy, as well as the possible need further surgery and unanticipated complications. The patient voiced understanding and all questions were answered. The patient elected to proceed with surgical intervention.             History and Physical Update     Patient was seen and examined. Patient's history and physical was reviewed with the patient. There has been no significant interval changes.       Electronically signed by Noah Gotti MD on 6/14/2019 at 7:20 AM

## 2019-06-14 NOTE — ANESTHESIA PRE PROCEDURE
Department of Anesthesiology  Preprocedure Note       Name:  Geraldina Schirmer   Age:  78 y.o.  :  1940                                          MRN:  [id number]         Date:  2019      Surgeon: Shubham Morley):  Kenton Barcenas MD    Procedure: RIGHT ENDOSCOPIC CARPAL TUNNEL RELEASE (Right )    Medications prior to admission:   Prior to Admission medications    Medication Sig Start Date End Date Taking? Authorizing Provider   gabapentin (NEURONTIN) 300 MG capsule Take 2 capsules by mouth 2 times daily. Intended supply: 90 days 19  Jian Bandana, DO   traMADol (ULTRAM) 50 MG tablet Take 1 tablet by mouth every 8 hours as needed for Pain for up to 30 days. 19  Jian Bandana, DO   gabapentin (NEURONTIN) 300 MG capsule Take 1 capsule by mouth nightly for 30 days.  Intended supply: 90 days 19  Jian Bandana, DO       Current medications:    Current Facility-Administered Medications   Medication Dose Route Frequency Provider Last Rate Last Dose    HYDROcodone-acetaminophen (NORCO) 5-325 MG per tablet 1 tablet  1 tablet Oral PRN Cleo Maldonado MD        Or    HYDROcodone-acetaminophen (NORCO) 5-325 MG per tablet 2 tablet  2 tablet Oral PRN Cleo Maldonado MD           Allergies:  No Known Allergies    Problem List:    Patient Active Problem List   Diagnosis Code    Bilateral carpal tunnel syndrome G56.03    Elevated hemoglobin (HCC) D58.2       Past Medical History:        Diagnosis Date    Carpal tunnel syndrome, bilateral     Elevated hemoglobin ([address] 2018       Past Surgical History:        Procedure Laterality Date    APPENDECTOMY      CATARACT REMOVAL WITH IMPLANT Right 13    CATARACT REMOVAL WITH IMPLANT Left 13    CHOLECYSTECTOMY      open    HYSTERECTOMY         Social History:    Social History     Tobacco Use    Smoking status: Current Every Day Smoker     Packs/day: 1.00     Years: 20.00     Pack years: 20.00 Pulmonary:Negative Pulmonary ROS breath sounds clear to auscultation                             Cardiovascular:Negative CV ROS            Rhythm: regular  Rate: normal           Beta Blocker:  Not on Beta Blocker         Neuro/Psych:   (+) neuromuscular disease (Bilateral carpal tunnel syndrome):,             GI/Hepatic/Renal: Neg GI/Hepatic/Renal ROS            Endo/Other:    (+) blood dyscrasia (Elevated hemoglobin)::., .                 Abdominal:           Vascular: negative vascular ROS. Anesthesia Plan      MAC     ASA 2       Induction: intravenous. Anesthetic plan and risks discussed with patient. Plan discussed with CRNA.                   Danny Stevenson MD   6/14/2019

## 2019-06-14 NOTE — OP NOTE
DATE OF PROCEDURE: 6/14/2019  SURGEON: Pam Nicole M.D.  ASSISTANT: none  PREOPERATIVE DIAGNOSIS: right carpal tunnel syndrome. POSTOPERATIVE DIAGNOSIS: right carpal tunnel syndrome. OPERATION: right endoscopic carpal tunnel release using the MicroAire  system. ANESTHESIA: (1) Monitored anesthesia care. (2) Local anesthetic by the  surgeon consisting of approximately 10 mL of 1% lidocaine with epinephrine. ESTIMATED BLOOD LOSS: Minimal  COMPLICATIONS: None. TOURNIQUET TIME: Total tourniquet time was approximately 2 minutes at 250  mmHg via brachial tourniquet. FINDINGS: (1) Evidence of median nerve compression beneath the transverse  carpal ligament. It was adequately decompressed with release of the  transverse carpal ligament. (2) Direct visualization of the median nerve  confirmed decompression through the carpal tunnel as well as distal forearm. DISPOSITION: Patient was stable throughout the procedure. INDICATION:  After failing conservative carpal tunnel syndrome  management, the patient wished to proceed with carpal tunnel release. Risks,  benefits, alternatives and complications were fully explained to her  including but not limited the risks of infection, damage to  nerves/vessels/tendons, failure to relieve her symptoms or worsening of  symptoms, pillar pain, thenar pain, hypothenar pain, scar sensitivity,  possible need for rehabilitation. She understood and wished to proceed. SURGERY IN DETAIL: The patient was identified in the holding area. The right  wrist was identified as the operative site. The patient was then seen by Anesthesia,  taken to the operating room, placed supine on the table and underwent  monitored anesthesia care per the anesthesia department. All bony  prominences were well padded. A well-padded arm tourniquet was placed. The extremity was prepared and draped in the standard sterile fashion.   Approximately 10 mL of 1% lidocaine with epinephrine was then infiltrated  over the surgical site. The extremity was then exsanguinated. The tourniquet was inflated to 250 mmHg. A transverse incision over the proximal volar wrist flexion crease was then  created from midline ulnarly for approximately 1 to 2 cm. This was followed  by blunt dissection and identification of the distal forearm fascia. This  was incised longitudinally. The underlying median nerve was then identified  and traced distally to the leading edge of the transverse carpal ligament. The median nerve was then protected and a synovial elevator was placed on the  undersurface of the transverse carpal ligament, between the undersurface of  the ligament and the median nerve and advanced from proximal to distal,  palpating the undersurface of the transverse carpal ligament, the distal  margin of the transverse carpal ligament as well as the hook of the hamate. This was then followed by sequential dilation and trial broach. With a clear  path identified, the MicroAire endoscopic SlimLine device was then advanced  in a similar fashion beneath the undersurface of the transverse carpal  ligament between the ligament and the median nerve, palpating the hook of the  hamate and clearly identifying the transverse fibers on the video monitor  from proximal to distally. The distal fat pad was identified and the blade  was deployed releasing the distal margin of the transverse carpal ligament. The blade was retracted, confirming complete division of the ligament distally  followed by redeployment of the blade which was then brought out proximally to fully release the carpal tunnel with release of the transverse carpal  ligament. There was radial and ulnar retraction of the leaflets. A Ragnell  elevator was inserted distally, confirming complete division of the ligament  as well as decompression of the nerve throughout the visualized course.  The  remaining portion of the distal forearm fascia was released under direct  visualization

## 2019-06-19 ENCOUNTER — OFFICE VISIT (OUTPATIENT)
Dept: PAIN MANAGEMENT | Age: 79
End: 2019-06-19
Payer: MEDICARE

## 2019-06-19 ENCOUNTER — PREP FOR PROCEDURE (OUTPATIENT)
Dept: PAIN MANAGEMENT | Age: 79
End: 2019-06-19

## 2019-06-19 VITALS
TEMPERATURE: 97.8 F | HEART RATE: 86 BPM | RESPIRATION RATE: 18 BRPM | WEIGHT: 144 LBS | SYSTOLIC BLOOD PRESSURE: 130 MMHG | OXYGEN SATURATION: 95 % | HEIGHT: 64 IN | DIASTOLIC BLOOD PRESSURE: 64 MMHG | BODY MASS INDEX: 24.59 KG/M2

## 2019-06-19 DIAGNOSIS — M43.16 SPONDYLOLISTHESIS, LUMBAR REGION: ICD-10-CM

## 2019-06-19 DIAGNOSIS — M47.817 LUMBOSACRAL SPONDYLOSIS WITHOUT MYELOPATHY: ICD-10-CM

## 2019-06-19 DIAGNOSIS — M48.061 SPINAL STENOSIS OF LUMBAR REGION WITHOUT NEUROGENIC CLAUDICATION: ICD-10-CM

## 2019-06-19 DIAGNOSIS — M48.061 SPINAL STENOSIS OF LUMBAR REGION WITHOUT NEUROGENIC CLAUDICATION: Primary | ICD-10-CM

## 2019-06-19 DIAGNOSIS — M51.36 DDD (DEGENERATIVE DISC DISEASE), LUMBAR: Primary | ICD-10-CM

## 2019-06-19 PROCEDURE — 99204 OFFICE O/P NEW MOD 45 MIN: CPT | Performed by: ANESTHESIOLOGY

## 2019-06-19 NOTE — PROGRESS NOTES
Patient:  LESLEY Jorgensen 1940  Date of Service:  19        Patient presents with complaints of pain in her low back as well as in her lower legs and severe pain in her feet that started 12 years ago and has been getting worse. She states the pain began following No specific cause    Pain is constant and is described as aching, dull, numb, miserable and pins and needles. She rates the pain as a 5/10 on her worst day , 10/10 on her best day, and a 6/10 on average on the VAS scale. Pain does not radiate to her hips and buttocks. She  has numbness, tingling, in her feet that has started to travel up her legs of the both lower extremities. Alleviating factors include: rest and cream that is all natural for pain. Aggravating factors include:  movement, walking, standing. She states that the pain does not keep her from sleeping at night. She took her last dose of Norco was prescribed for post op carpal tunnel pain and made her itch and feel nauseated. .     She has been on anticoagulation medications to include none and is managed by NA    Previous treatments: was given an injection for pain and inflammation with Dr. Anthony Fabry.       Personal Expectations from this treatment: increase activity and decrease pain    /64   Pulse 86   Temp 97.8 °F (36.6 °C)   Resp 18   Ht 5' 3.5\" (1.613 m)   Wt 144 lb (65.3 kg)   SpO2 95%   BMI 25.11 kg/m²

## 2019-06-19 NOTE — PROGRESS NOTES
[address] [place] [address], [address] [place] [address]      [phone]                  Consult Note      Patient:  LESLEY Cervantes 1940    Date of Service:  19     Requesting Physician:  Kamini Pena DO    Reason for Consult:      Patient presents with complaints of low back and lower extremity pain     HISTORY OF PRESENT ILLNESS:      Ms. Nancy Chakraborty is a 78 y.o. female [facility]  Low back and lower extremity pain for about one year. Pain is constant and is described as aching, sharp and stabbing. Pain does radiate to both lower extremities. She  has numbness, tingling of the both lower extremities     Patient does not have bladder or bowel dysfunction. Alleviating factors include: rest.  Aggravating factors include: movement, walking, standing, bending, lifting. Pain causes functional limitations/ limits Adl's : Yes    Note: She recently underwent left carpel tunnel release and is supposed to get the other side operated on . Nursing notes and pain history details reviewed. Previous treatments:   Physical Therapy : no     Chiropractic treatment: no    Medications: - NSAID's : yes -             - Membrane stabilizers : yes - tried Neurontin not much help            - Opioids : no            - Adjuvants or Others : no    TENS Unit: no    Surgeries: no spine surgeries    Interventional Pain procedures/ nerve blocks: no    She has not been on anticoagulation medications no and include . She has not been on herbal supplements. She is not diabetic.     H/O Smoking: Yes  H/O alcohol abuse : no   H/O Illicit drug use : no     Employment: retired    Imaging:     MRI of LS spine : 2019:  FINDINGS:        BONE MARROW SIGNAL: There is normal marrow signal.       T12-L1: Normal T12-L1       L1-L2: Noncompressive disc bulge without central or foraminal   stenosis.       L2-L3: Moderate diffuse bulge and facet hypertrophy with mild canal   and foraminal stenosis.       L3-L4: Noncompressive disc bulge and facet hypertrophy with moderate   foraminal stenosis.       L4-L5: Grade 1 anterior listhesis noted with a moderate pseudobulge. Prominent facet hypertrophy and ligamentum flavum infolding noted with   moderate canal and lateral recess stenosis. Severe foraminal stenosis   noted.       L5-S1: Prominent facet hypertrophy noted with mild foraminal stenosis. No canal stenosis.       SOFT TISSUES: The visualized paravertebral soft tissues are within   normal limits.           Impression   1. Grade 1 spondylolisthesis of L4 with moderate canal and lateral   recess stenosis. 2. Multilevel disc bulges as discussed. 3. No disc herniations identified.         X ray LS spein: 4/2019:  FINDINGS: AP and lateral views of the lumbar spine.       Atherosclerosis.        Mild S-shaped thoracolumbar scoliosis. Vertebral height maintained. About 4.5 mm anterolisthesis L4-5. Mild multilevel disc space   narrowing with marginal spurring. Facet joint narrowing with   subchondral sclerosis and spurring most severe at L4-5 and L5-S1.           Impression   1. Scoliosis. 2. Spondylosis. 3. L4-5 spondylolisthesis     Labs: Platelet count - normal.    Past Medical History:   Diagnosis Date    Carpal tunnel syndrome, bilateral     Elevated hemoglobin ([facility] [address] 8/6/2018       Past Surgical History:   Procedure Laterality Date    APPENDECTOMY      CARPAL TUNNEL RELEASE Right 6/14/2019    RIGHT ENDOSCOPIC CARPAL TUNNEL RELEASE performed by Reba Wong MD at [address] Right 1 22 13    CATARACT REMOVAL WITH IMPLANT Left 07/02/13    CHOLECYSTECTOMY  1980's    open    HYSTERECTOMY  1980's       Prior to Admission medications    Medication Sig Start Date End Date Taking?  Authorizing Provider   gabapentin (NEURONTIN) 300 MG capsule Take 2 capsules by mouth 2 times daily. Intended supply: 90 days  Patient taking differently: Take 300 mg by mouth 2 times daily. Indications: has not resumed since carpal tunnel surgery 7/14/19 Intended supply: 90 days 5/28/19 5/27/20 Yes Dicie Reasons, DO       Allergies   Allergen Reactions    Hydrocodone-Acetaminophen Itching and Nausea Only       Social History     Socioeconomic History    Marital status:       Spouse name: Not on file    Number of children: Not on file    Years of education: Not on file    Highest education level: Not on file   Occupational History    Not on file   Social Needs    Financial resource strain: Not on file    Food insecurity:     Worry: Not on file     Inability: Not on file    Transportation needs:     Medical: Not on file     Non-medical: Not on file   Tobacco Use    Smoking status: Current Every Day Smoker     Packs/day: 1.00     Years: 60.00     Pack years: 60.00     Types: Cigarettes     Start date: 7/13/1955    Smokeless tobacco: Never Used   Substance and Sexual Activity    Alcohol use: No    Drug use: No    Sexual activity: Not on file   Lifestyle    Physical activity:     Days per week: Not on file     Minutes per session: Not on file    Stress: Not on file   Relationships    Social connections:     Talks on phone: Not on file     Gets together: Not on file     Attends Hoahaoism service: Not on file     Active member of club or organization: Not on file     Attends meetings of clubs or organizations: Not on file     Relationship status: Not on file    Intimate partner violence:     Fear of current or ex partner: Not on file     Emotionally abused: Not on file     Physically abused: Not on file     Forced sexual activity: Not on file   Other Topics Concern    Not on file   Social History Narrative    Not on file       Family History   Problem Relation Age of Onset    Cancer Father        REVIEW OF SYSTEMS:     Patient specifically denies fever/chills, chest pain, shortness of breath, new bowel or bladder complaints. All other review of systems was negative except as detailed bwlow. Review of Systems - General ROS: negative for - chills, fever, night sweats, weight gain or weight loss  Psychological ROS: negative  Ophthalmic ROS: negative  ENT ROS: negative  Allergy and Immunology ROS: negative  Hematological and Lymphatic ROS: negative  Endocrine ROS: negative  Respiratory ROS: no cough, shortness of breath, or wheezing  Cardiovascular ROS: no chest pain or dyspnea on exertion  Gastrointestinal ROS: no abdominal pain, change in bowel habits, or black or bloody stools  Genito-Urinary ROS: no dysuria, trouble voiding, or hematuria  Musculoskeletal ROS: see HPi  Neurological ROS: no TIA or stroke symptoms  Dermatological ROS: some bruise form scratch noted     PHYSICAL EXAMINATION:      /64   Pulse 86   Temp 97.8 °F (36.6 °C)   Resp 18   Ht 5' 3.5\" (1.613 m)   Wt 144 lb (65.3 kg)   SpO2 95%   BMI 25.11 kg/m²     General:      General appearance:  Pleasant and well-hydrated, in no distress and A & O x 3  Build:Normal Weight  Function: Rises from seated position easily and Moves about room without difficulty    HEENT:    Head:normocephalic, atraumatic  Pupils:regular, round, equal  Sclera: icterus absent    Lungs:    Breathing:normal breathing pattern     CVS:     RRR    Abdomen:    Shape:non-distended and normal  Tenderness:none  Guarding:none    Cervical spine:    Inspection:normal  Palpation:tenderness paravertebral muscles, tenderness trapezium, left, right and positive. Range of motion:No pain     Thoracic spine:     Spine inspection:some scoliosis  Palpation:No tenderness over the midline and paraspinal area, bilaterally  Range of motion:normal in flexion, extension rotation bilateral and is not painful.     Lumbar spine:    Spine inspection: Normal   Palpation: Tenderness paravertebral muscles No bilaterally  Range of motion: Decreased, flexion Decreased, Lateral bending, extension and rotation bilaterally reduced is somewhat painful. Sacroiliac joint tenderness No bilaterally  KHRIS test: negative bilaterally  Gaenslen's test:negative bilaterally   Piriformis tenderness: negative bilaterally  SLR : negative bilaterally  Trochanteric bursa tenderness: negative bilaterally  CVA tenderness:No       Musculoskeletal:    Trigger points in trapezius: No bilaterally  Trigger points in rhomboids: No bilaterally  Trigger points in Paravertebral: No bilaterally      Extremities:    Tremors:None bilaterally upper and lower  Range of motion:Generally normal shoulders , pain with internal rotation of hips negative . Intact:Yes  Varicose veins:absent   Pulses:present Lt radial  Cyanosis:none  Edema:none x all 4 extremities  Bilateral dorsalis pedis pulses - felt    Knee:    ROM : no pain  Joint line tenderness : no    Neurological:    Sensory: Normal to light touch     Motor:   Right  5/5              Left  5/5               Right Bicep 5/5           Left Bicep 5/5              Right Triceps 5/5       Left Triceps 5/5          Right Deltoid 5/5     Left Deltoid 5/5                  Right Quadriceps 5/5          Left Quadriceps 5/5           Right Gastrocnemius 5/5    Left Gastrocnemius 5/5  Right Ant Tibialis 5/5  Left Ant Tibialis 5/5    Reflexes:    Right Brachioradialis reflex 2+  Left Brachioradialis reflex 2+  Right Biceps reflex 2+  Left Biceps reflex 2+  Right Triceps reflex 2+  Left Triceps reflex 2+  Right Quadriceps reflex 2+  Left Quadriceps reflex 2+  Right Achilles reflex 2+  Left Achilles reflex 2+    Gait:normal Yes    Dermatology:    Skin:no rashes or lesions noted    Assessment/Plan:     Diagnosis Orders   1. DDD (degenerative disc disease), lumbar     2. Spondylolisthesis, lumbar region     3. Spinal stenosis of lumbar region without neurogenic claudication     4. Lumbosacral spondylosis without myelopathy         78 y.o. female with h/o chronic low back and b/l lower extremity pain with features of spinal stenosis and neurogenic claudication. MR changes noted - significant at L4-5 with l4-5 Spondylolisthesis/ Spinal stenosis noted. Will get X-ray of LS spine Flex/ Ext to rule out any dynamic changes on flex / ext. She has failed conservative treatment - rest/ activity modification/ NSAID's for >6 weeks. Pain is significant to limit activities and exercise. Will schedule for LESI L4-5 under fluoroscopic guidance. RBA discussed and patient agreed. Set up for PT after LESI and pain relief so she can participate in PT with less pain. Urine screen today: no    Counseling :    Patient encouraged to stay active and to watch weight    Encouraged to continue Regular home exercise program as tolerated - stretching / strengthening. Smoking cessation counseling : yes     Treatment plan discussed with the patient including medication and procedure side effects. Controlled Substances Monitoring:   [place] reviewed today. - not on chronic narcotics.       Malena Lemon MD    CC:    Shy Pantoja, [facility] [address] [facility]

## 2019-06-24 ENCOUNTER — OFFICE VISIT (OUTPATIENT)
Dept: ORTHOPEDIC SURGERY | Age: 79
End: 2019-06-24

## 2019-06-24 VITALS
TEMPERATURE: 98.2 F | RESPIRATION RATE: 18 BRPM | DIASTOLIC BLOOD PRESSURE: 82 MMHG | BODY MASS INDEX: 24.58 KG/M2 | WEIGHT: 143.96 LBS | SYSTOLIC BLOOD PRESSURE: 142 MMHG | HEIGHT: 64 IN | HEART RATE: 77 BPM

## 2019-06-24 DIAGNOSIS — G56.03 BILATERAL CARPAL TUNNEL SYNDROME: Primary | ICD-10-CM

## 2019-06-24 PROCEDURE — 99024 POSTOP FOLLOW-UP VISIT: CPT | Performed by: ORTHOPAEDIC SURGERY

## 2019-06-24 NOTE — PROGRESS NOTES
Department of Orthopedic Surgery  History and Physical      CHIEF COMPLAINT:  Right greater than left hand pain    HISTORY OF PRESENT ILLNESS:                The patient is a RHD 78 y.o. female who presents with right greater than left hand pain. Patient has had right greater than left hand pain for less than a year. She reports pain along with pins and needles and tingling and burning. She does not have any nocturnal symptoms. She has tried braces in the past which does not help her symptoms. No injury. Her primary care physician has provided her 2-3 cortisone injections to bilateral wrists for carpal tunnel. She states this did help her symptoms short-term but did not provide her long lasting benefit. She did have an EMG nerve conduction study test on July 31 of 2018. Right median nerve sensory latency was 3.65 and left was 4.06. Right median nerve motor latency was 4. 3 AM left was 3.96. On the EMG portion of the exam she did have reduced recruitment pattern of her right abductor pollicis brevis. This is consistent with bilateral carpal tunnel syndrome, moderate in severity. She returns today postop right endoscopic carpal tunnel release. She reports improvement in her preoperative symptoms of pain. However she is still experiencing some mild discomfort. Still with persistent numbness in the median nerve distribution. Past Medical History:        Diagnosis Date    Carpal tunnel syndrome, bilateral     Elevated hemoglobin ([facility] [address] 8/6/2018     Past Surgical History:        Procedure Laterality Date    APPENDECTOMY      CARPAL TUNNEL RELEASE Right 6/14/2019    RIGHT ENDOSCOPIC CARPAL TUNNEL RELEASE performed by Kenton Barcenas MD at [address] Right 1 22 13    CATARACT REMOVAL WITH IMPLANT Left 07/02/13    CHOLECYSTECTOMY  1980's    open    HYSTERECTOMY  1980's     Current Medications:   No current facility-administered medications for this visit.    Allergies: Hydrocodone-acetaminophen    Social History:   TOBACCO:   reports that she has been smoking cigarettes. She started smoking about 63 years ago. She has a 60.00 pack-year smoking history. She has never used smokeless tobacco.  ETOH:   reports that she does not drink alcohol. DRUGS:   reports that she does not use drugs. ACTIVITIES OF DAILY LIVING:    OCCUPATION:    Family History:       Problem Relation Age of Onset    Cancer Father        REVIEW OF SYSTEMS:  CONSTITUTIONAL:  negative  EYES:  negative  HEENT:  negative  RESPIRATORY:  negative  CARDIOVASCULAR:  negative  GASTROINTESTINAL:  GERD  GENITOURINARY:  negative  INTEGUMENT/BREAST:  negative  HEMATOLOGIC/LYMPHATIC:  negative  ALLERGIC/IMMUNOLOGIC:  negative  ENDOCRINE:  negative  MUSCULOSKELETAL:  pain  NEUROLOGICAL:  N/T  BEHAVIOR/PSYCH:  negative    PHYSICAL EXAM:    VITALS:  BP (!) 142/82 (Site: Left Upper Arm, Position: Sitting)   Pulse 77   Temp 98.2 °F (36.8 °C) (Oral)   Resp 18   Ht 5' 3.5\" (1.613 m)   Wt 143 lb 15.4 oz (65.3 kg)   BMI 25.10 kg/m²   CONSTITUTIONAL:  awake, alert, cooperative, no apparent distress, and appears stated age  EYES:  Lids and lashes normal, pupils equal, round and reactive to light, extra ocular muscles intact, sclera clear, conjunctiva normal  ENT:  Normocephalic, without obvious abnormality, atraumatic, sinuses nontender on palpation, external ears without lesions, oral pharynx with moist mucus membranes, tonsils without erythema or exudates, gums normal and good dentition. NECK:  Supple, symmetrical, trachea midline, no adenopathy, thyroid symmetric, not enlarged and no tenderness, skin normal  LUNGS:  CTA  CARDIOVASCULAR:  2+ radial pulses, extremities warm and well perfused  ABDOMEN:  NTTP  CHEST:  Atraumatic   GENITAL/URINARY:  deferred  NEUROLOGIC:  Awake, alert, oriented to name, place and time. Cranial nerves II-XII are grossly intact. Motor is 5 out of 5 bilaterally.   Sensory is intact.  gait is normal.  MUSCULOSKELETAL:    Right upper extremity: Incision healing nicely. Sutures removed. Sensation intact thumb index middle ring and small fingers to light touch. Full digital flexion extension. Left upper extremity: Nontender about shoulder and elbow with good range of motion. Negative Tinel's of the cubital tunnel, positive Tinel's of the carpal tunnel, positive Gabriella's, negative Finkelstein's, negative CMC grind, negative tenderness of the A1 pulleys no active triggering. Full flexion of the fingers. Full extension. - wartenbergs and cross finger testing, APB strength 5/5. - atrophy. Median, ulnar, radial nerves intact light touch. Brisk capillary refill. Gross motor 5 out of 5. DATA:    CBC:   Lab Results   Component Value Date    WBC 9.0 04/16/2019    RBC 5.61 04/16/2019    HGB 18.9 04/16/2019    HCT 55.4 04/16/2019    MCV 98.8 04/16/2019    MCH 33.7 04/16/2019    MCHC 34.1 04/16/2019    RDW 12.9 04/16/2019     04/16/2019    MPV 8.8 04/16/2019     PT/INR:  No results found for: PROTIME, INR    Radiology Review:  X-rays of bilateral wrists were reviewed from 10/23/2018. 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 No acute fractures or dislocations. Impression: Bilateral thumb CMC joint arthritis    IMPRESSION:  · Bilateral carpal tunnel syndrome  · Postop right endoscopic carpal tunnel release    PLAN:    She reports improved pain on the right. She would like to proceed with surgery on the left. Plan for left endoscopic carpal tunnel release.       I explained the risks, benefits, alternatives and complications of surgery with the patient including but not limited to the risks of infection, possible damage to nerves, vessels, or tendons, stiffness, loss of range of motion, scar sensitivity, wound healing complications, worsening symptoms, possible need for therapy, as well as the possible need further surgery and unanticipated complications. The patient voiced understanding and all questions were answered. The patient elected to proceed with surgical intervention.

## 2019-06-27 NOTE — PROGRESS NOTES
[facility] PRE-ADMISSION TESTING INSTRUCTIONS    The Preadmission Testing patient is instructed accordingly using the following criteria (check applicable):    ARRIVAL INSTRUCTIONS:  [x] Parking the day of Surgery is located in the Main Entrance lot. Upon entering the door, make an immediate right to the surgery reception desk    [x] Bring photo ID and insurance card    [] Bring in a copy of Living will or Durable Power of  papers. [x] Please be sure to arrange for responsible adult to provide transportation to and from the hospital    [x] Please arrange for responsible adult to be with you for the 24 hour period post procedure due to having anesthesia      GENERAL INSTRUCTIONS:    [x] Nothing by mouth after midnight, including gum, candy, mints or water    [x] You may brush your teeth, but do not swallow any water    [] Take medications as instructed with 1-2 oz of water    [x] Stop herbal supplements and vitamins 5 days prior to procedure    [x] Follow preop dosing of blood thinners per physician instructions    [] Take 1/2 dose of evening insulin, but no insulin after midnight    [] No oral diabetic medications after midnight    [] If diabetic and have low blood sugar or feel symptomatic, take 1-2oz apple juice only    [] Bring inhalers day of surgery    [] Bring C-PAP/ Bi-Pap day of surgery    [] Bring urine specimen day of surgery    [x] Shower or bath with soap, lather and rinse well, AM of Surgery, no lotion, powders or creams to surgical site    [] Follow bowel prep as instructed per surgeon    [x] No tobacco products within 24 hours of surgery     [x] No alcohol or illegal drug use within 24 hours of surgery.     [x] Jewelry, body piercing's, eyeglasses, contact lenses and dentures are not permitted into surgery (bring cases)      [x] Please do not wear any nail polish, make up or hair products on the day of surgery    [x] If not already done, you can expect a call from

## 2019-07-01 ENCOUNTER — EVALUATION (OUTPATIENT)
Dept: PHYSICAL THERAPY | Age: 79
End: 2019-07-01
Payer: MEDICARE

## 2019-07-01 DIAGNOSIS — M47.817 LUMBOSACRAL SPONDYLOSIS WITHOUT MYELOPATHY: ICD-10-CM

## 2019-07-01 DIAGNOSIS — M43.16 SPONDYLOLISTHESIS, LUMBAR REGION: ICD-10-CM

## 2019-07-01 DIAGNOSIS — M51.36 DDD (DEGENERATIVE DISC DISEASE), LUMBAR: Primary | ICD-10-CM

## 2019-07-01 DIAGNOSIS — M48.061 SPINAL STENOSIS OF LUMBAR REGION WITHOUT NEUROGENIC CLAUDICATION: ICD-10-CM

## 2019-07-01 PROCEDURE — 97162 PT EVAL MOD COMPLEX 30 MIN: CPT | Performed by: PHYSICAL THERAPIST

## 2019-07-01 PROCEDURE — 97110 THERAPEUTIC EXERCISES: CPT | Performed by: PHYSICAL THERAPIST

## 2019-07-01 NOTE — PROGRESS NOTES
extension 25%   Increase Strength in trunk    Able to perform/complete the following functions/tasks: walk 10 minutes   Oswestry Low Back Disability Questionnaire 32% disability    Long Term goals (4-6 weeks)   Decrease reported pain to 0-3/10   Increase ROM in extension 50%   Increase Strength to 5/5 in LEs, 4+/5 in trunk    Balance standing good w/ eyes open and feet together   Able to perform/complete the following functions/tasks: walk 20-30 min    Oswestry Low Back Disability Questionnaire 20% disability    Independent with Home Exercise Programs    Rehab Potential: [x] Good  [] Fair  [] Poor    PLAN       Treatment Plan:   [x] Therapeutic Exercise  [x] Therapeutic Activity  [x] Neuromuscular Re-education   [x] Gait Training  [x] Balance Training  [] Aerobic conditioning  [] Manual Therapy  [] Massage/Fascial release   [x] Work/Sport specific activities    [] Pain Neuroscience [x] Cold/hotpack  [] Vasocompression  [x] Electrical Stimulation  [] Lumbar/Cervical Traction  [] Ultrasound   [] Iontophoresis: 4 mg/mL Dexamethasone Sodium Phosphate 40-80 mAmin        [x] Instruction in HEP      []  Medication allergies reviewed for use of Dexamethasone Sodium Phosphate 4mg/ml  with iontophoresis treatments. Patient is not allergic. The following CPT codes are likely to be used in the care of this patient: 16936 Therapeutic Exercise , 85168 Neuromuscular Re-Education , 79066 Therapeutic Activities , 32724 Gait Training ,  Electrical Stimulation      Suggested Professional Referral: [x] No  [] Yes:     Patient Education:  [x] Plans/Goals, Risks/Benefits discussed  [x] Home exercise program  Method of Education: [x] Verbal  [x] Demo  [x] Written  Comprehension of Education:  [x] Verbalizes understanding. [x] Demonstrates understanding. [] Needs Review. [] Demonstrates/verbalizes understanding of HEP/Ed previously given.     Frequency:  1-2 days per week for 4-6 weeks    Patient understands

## 2019-07-02 ENCOUNTER — HOSPITAL ENCOUNTER (OUTPATIENT)
Age: 79
Setting detail: OUTPATIENT SURGERY
Discharge: HOME | End: 2019-07-02
Attending: ORTHOPAEDIC SURGERY | Admitting: ORTHOPAEDIC SURGERY
Payer: MEDICARE

## 2019-07-02 ENCOUNTER — ANESTHESIA EVENT (OUTPATIENT)
Dept: OPERATING ROOM | Age: 79
End: 2019-07-02
Payer: MEDICARE

## 2019-07-02 ENCOUNTER — ANESTHESIA (OUTPATIENT)
Dept: OPERATING ROOM | Age: 79
End: 2019-07-02
Payer: MEDICARE

## 2019-07-02 VITALS
OXYGEN SATURATION: 93 % | RESPIRATION RATE: 18 BRPM | HEIGHT: 64 IN | WEIGHT: 144 LBS | SYSTOLIC BLOOD PRESSURE: 144 MMHG | DIASTOLIC BLOOD PRESSURE: 58 MMHG | TEMPERATURE: 97.7 F | HEART RATE: 70 BPM | BODY MASS INDEX: 24.59 KG/M2

## 2019-07-02 VITALS — OXYGEN SATURATION: 97 % | DIASTOLIC BLOOD PRESSURE: 72 MMHG | SYSTOLIC BLOOD PRESSURE: 134 MMHG

## 2019-07-02 DIAGNOSIS — G56.03 BILATERAL CARPAL TUNNEL SYNDROME: Primary | ICD-10-CM

## 2019-07-02 PROCEDURE — 2720000010 HC SURG SUPPLY STERILE: Performed by: ORTHOPAEDIC SURGERY

## 2019-07-02 PROCEDURE — 3700000000 HC ANESTHESIA ATTENDED CARE: Performed by: ORTHOPAEDIC SURGERY

## 2019-07-02 PROCEDURE — 29848 WRIST ENDOSCOPY/SURGERY: CPT | Performed by: ORTHOPAEDIC SURGERY

## 2019-07-02 PROCEDURE — 7100000010 HC PHASE II RECOVERY - FIRST 15 MIN: Performed by: ORTHOPAEDIC SURGERY

## 2019-07-02 PROCEDURE — 6360000002 HC RX W HCPCS

## 2019-07-02 PROCEDURE — 2500000003 HC RX 250 WO HCPCS

## 2019-07-02 PROCEDURE — 2709999900 HC NON-CHARGEABLE SUPPLY: Performed by: ORTHOPAEDIC SURGERY

## 2019-07-02 PROCEDURE — 7100000011 HC PHASE II RECOVERY - ADDTL 15 MIN: Performed by: ORTHOPAEDIC SURGERY

## 2019-07-02 PROCEDURE — 3600000012 HC SURGERY LEVEL 2 ADDTL 15MIN: Performed by: ORTHOPAEDIC SURGERY

## 2019-07-02 PROCEDURE — 2580000003 HC RX 258: Performed by: PHYSICIAN ASSISTANT

## 2019-07-02 PROCEDURE — 6360000002 HC RX W HCPCS: Performed by: PHYSICIAN ASSISTANT

## 2019-07-02 PROCEDURE — 3700000001 HC ADD 15 MINUTES (ANESTHESIA): Performed by: ORTHOPAEDIC SURGERY

## 2019-07-02 PROCEDURE — 2500000003 HC RX 250 WO HCPCS: Performed by: ORTHOPAEDIC SURGERY

## 2019-07-02 PROCEDURE — 3600000002 HC SURGERY LEVEL 2 BASE: Performed by: ORTHOPAEDIC SURGERY

## 2019-07-02 RX ORDER — LIDOCAINE HYDROCHLORIDE AND EPINEPHRINE 10; 10 MG/ML; UG/ML
INJECTION, SOLUTION INFILTRATION; PERINEURAL PRN
Status: DISCONTINUED | OUTPATIENT
Start: 2019-07-02 | End: 2019-07-02 | Stop reason: ALTCHOICE

## 2019-07-02 RX ORDER — LIDOCAINE HYDROCHLORIDE 20 MG/ML
INJECTION, SOLUTION EPIDURAL; INFILTRATION; INTRACAUDAL; PERINEURAL PRN
Status: DISCONTINUED | OUTPATIENT
Start: 2019-07-02 | End: 2019-07-02 | Stop reason: SDUPTHER

## 2019-07-02 RX ORDER — SODIUM CHLORIDE 0.9 % (FLUSH) 0.9 %
10 SYRINGE (ML) INJECTION EVERY 12 HOURS SCHEDULED
Status: DISCONTINUED | OUTPATIENT
Start: 2019-07-02 | End: 2019-07-02 | Stop reason: HOSPADM

## 2019-07-02 RX ORDER — PROPOFOL 10 MG/ML
INJECTION, EMULSION INTRAVENOUS CONTINUOUS PRN
Status: DISCONTINUED | OUTPATIENT
Start: 2019-07-02 | End: 2019-07-02 | Stop reason: SDUPTHER

## 2019-07-02 RX ORDER — SODIUM CHLORIDE 9 MG/ML
INJECTION, SOLUTION INTRAVENOUS CONTINUOUS
Status: DISCONTINUED | OUTPATIENT
Start: 2019-07-02 | End: 2019-07-02 | Stop reason: HOSPADM

## 2019-07-02 RX ORDER — TRAMADOL HYDROCHLORIDE 50 MG/1
50 TABLET ORAL EVERY 6 HOURS PRN
Qty: 20 TABLET | Refills: 0 | Status: SHIPPED | OUTPATIENT
Start: 2019-07-02 | End: 2019-07-07

## 2019-07-02 RX ORDER — SODIUM CHLORIDE 0.9 % (FLUSH) 0.9 %
10 SYRINGE (ML) INJECTION PRN
Status: DISCONTINUED | OUTPATIENT
Start: 2019-07-02 | End: 2019-07-02 | Stop reason: HOSPADM

## 2019-07-02 RX ORDER — FENTANYL CITRATE 50 UG/ML
INJECTION, SOLUTION INTRAMUSCULAR; INTRAVENOUS PRN
Status: DISCONTINUED | OUTPATIENT
Start: 2019-07-02 | End: 2019-07-02 | Stop reason: SDUPTHER

## 2019-07-02 RX ADMIN — SODIUM CHLORIDE: 9 INJECTION, SOLUTION INTRAVENOUS at 07:46

## 2019-07-02 RX ADMIN — Medication 2 G: at 07:46

## 2019-07-02 RX ADMIN — PROPOFOL 50 MCG/KG/MIN: 10 INJECTION, EMULSION INTRAVENOUS at 07:52

## 2019-07-02 RX ADMIN — FENTANYL CITRATE 25 MCG: 50 INJECTION, SOLUTION INTRAMUSCULAR; INTRAVENOUS at 07:58

## 2019-07-02 RX ADMIN — LIDOCAINE HYDROCHLORIDE 40 MG: 20 INJECTION, SOLUTION EPIDURAL; INFILTRATION; INTRACAUDAL; PERINEURAL at 07:52

## 2019-07-02 RX ADMIN — FENTANYL CITRATE 25 MCG: 50 INJECTION, SOLUTION INTRAMUSCULAR; INTRAVENOUS at 07:52

## 2019-07-02 ASSESSMENT — LIFESTYLE VARIABLES: SMOKING_STATUS: 1

## 2019-07-02 ASSESSMENT — PAIN DESCRIPTION - PAIN TYPE
TYPE: SURGICAL PAIN
TYPE: SURGICAL PAIN

## 2019-07-02 ASSESSMENT — PAIN SCALES - GENERAL
PAINLEVEL_OUTOF10: 0
PAINLEVEL_OUTOF10: 0

## 2019-07-02 ASSESSMENT — PAIN DESCRIPTION - DESCRIPTORS: DESCRIPTORS: ACHING

## 2019-07-02 ASSESSMENT — PAIN - FUNCTIONAL ASSESSMENT: PAIN_FUNCTIONAL_ASSESSMENT: 0-10

## 2019-07-02 ASSESSMENT — PULMONARY FUNCTION TESTS
PIF_VALUE: 0
PIF_VALUE: 0

## 2019-07-02 NOTE — H&P
Current Hospital Medications   No current facility-administered medications for this visit. Allergies:  Hydrocodone-acetaminophen     Social History:   TOBACCO:   reports that she has been smoking cigarettes. She started smoking about 63 years ago. She has a 60.00 pack-year smoking history. She has never used smokeless tobacco.  ETOH:   reports that she does not drink alcohol. DRUGS:   reports that she does not use drugs. ACTIVITIES OF DAILY LIVING:    OCCUPATION:    Family History:   Family History             Problem Relation Age of Onset    Cancer Father              REVIEW OF SYSTEMS:  CONSTITUTIONAL:  negative  EYES:  negative  HEENT:  negative  RESPIRATORY:  negative  CARDIOVASCULAR:  negative  GASTROINTESTINAL:  GERD  GENITOURINARY:  negative  INTEGUMENT/BREAST:  negative  HEMATOLOGIC/LYMPHATIC:  negative  ALLERGIC/IMMUNOLOGIC:  negative  ENDOCRINE:  negative  MUSCULOSKELETAL:  pain  NEUROLOGICAL:  N/T  BEHAVIOR/PSYCH:  negative     PHYSICAL EXAM:    VITALS:  BP (!) 142/82 (Site: Left Upper Arm, Position: Sitting)   Pulse 77   Temp 98.2 °F (36.8 °C) (Oral)   Resp 18   Ht 5' 3.5\" (1.613 m)   Wt 143 lb 15.4 oz (65.3 kg)   BMI 25.10 kg/m²   CONSTITUTIONAL:  awake, alert, cooperative, no apparent distress, and appears stated age  EYES:  Lids and lashes normal, pupils equal, round and reactive to light, extra ocular muscles intact, sclera clear, conjunctiva normal  ENT:  Normocephalic, without obvious abnormality, atraumatic, sinuses nontender on palpation, external ears without lesions, oral pharynx with moist mucus membranes, tonsils without erythema or exudates, gums normal and good dentition.   NECK:  Supple, symmetrical, trachea midline, no adenopathy, thyroid symmetric, not enlarged and no tenderness, skin normal  LUNGS:  CTA  CARDIOVASCULAR:  2+ radial pulses, extremities warm and well perfused  ABDOMEN:  NTTP  CHEST:  Atraumatic   GENITAL/URINARY:  deferred  NEUROLOGIC:  Awake, alert,

## 2019-07-02 NOTE — BRIEF OP NOTE
Brief Postoperative Note  ______________________________________________________________    Patient: Jeanie Fierro  YOB: 1940  MRN: [id number]  Date of Procedure: 7/2/2019    Pre-Op Diagnosis: LEFT CARPAL TUNNEL SYNDROME    Post-Op Diagnosis: Same       Procedure(s):  LEFT ENDOSCOPIC CARPAL TUNNEL RELEASE    Anesthesia: Monitor Anesthesia Care    Surgeon(s):  Yannick Escobedo MD    Assistant: none    Estimated Blood Loss (mL): less than 50     Complications: None    Specimens:   * No specimens in log *    Implants:  * No implants in log *      Drains: * No LDAs found *    Findings: se op note    Yannick Escobedo MD  Date: 7/2/2019  Time: 8:04 AM

## 2019-07-02 NOTE — ANESTHESIA POSTPROCEDURE EVALUATION
Department of Anesthesiology  Postprocedure Note    Patient: Erin Yanes  MRN: [id number]  YOB: 1940  Date of evaluation: 7/2/2019  Time:  8:38 AM     Procedure Summary     Date:  07/02/19 Room / Location:  [facility] OR 02 / [facility] OR    Anesthesia Start:  8502 Anesthesia Stop:  0813    Procedure:  LEFT ENDOSCOPIC CARPAL TUNNEL RELEASE (Left ) Diagnosis:  (LEFT CARPAL TUNNEL SYNDROME)    Surgeon:  Adilene Matute MD Responsible Provider:  Mirza Rivera MD    Anesthesia Type:  MAC ASA Status:  2          Anesthesia Type: MAC    Laura Phase I: Laura Score: 10    Laura Phase II: Laura Score: 10    Last vitals: Reviewed and per EMR flowsheets.        Anesthesia Post Evaluation    Patient location during evaluation: PACU  Patient participation: complete - patient participated  Level of consciousness: awake and alert  Pain score: 0  Airway patency: patent  Nausea & Vomiting: no vomiting and no nausea  Complications: no  Cardiovascular status: hemodynamically stable  Respiratory status: spontaneous ventilation  Hydration status: stable

## 2019-07-09 ENCOUNTER — HOSPITAL ENCOUNTER (OUTPATIENT)
Age: 79
Setting detail: OUTPATIENT SURGERY
Discharge: HOME | End: 2019-07-09
Attending: ANESTHESIOLOGY | Admitting: ANESTHESIOLOGY
Payer: MEDICARE

## 2019-07-09 ENCOUNTER — HOSPITAL ENCOUNTER (OUTPATIENT)
Dept: OPERATING ROOM | Age: 79
Setting detail: OUTPATIENT SURGERY
Discharge: HOME | End: 2019-07-09
Attending: ANESTHESIOLOGY
Payer: MEDICARE

## 2019-07-09 VITALS
DIASTOLIC BLOOD PRESSURE: 81 MMHG | OXYGEN SATURATION: 96 % | HEART RATE: 77 BPM | SYSTOLIC BLOOD PRESSURE: 129 MMHG | RESPIRATION RATE: 22 BRPM

## 2019-07-09 DIAGNOSIS — M51.36 LUMBAR DEGENERATIVE DISC DISEASE: ICD-10-CM

## 2019-07-09 PROCEDURE — 7100000010 HC PHASE II RECOVERY - FIRST 15 MIN: Performed by: ANESTHESIOLOGY

## 2019-07-09 PROCEDURE — 3600000005 HC SURGERY LEVEL 5 BASE: Performed by: ANESTHESIOLOGY

## 2019-07-09 PROCEDURE — 6360000002 HC RX W HCPCS: Performed by: ANESTHESIOLOGY

## 2019-07-09 PROCEDURE — 7100000011 HC PHASE II RECOVERY - ADDTL 15 MIN: Performed by: ANESTHESIOLOGY

## 2019-07-09 PROCEDURE — 2500000003 HC RX 250 WO HCPCS: Performed by: ANESTHESIOLOGY

## 2019-07-09 PROCEDURE — 2709999900 HC NON-CHARGEABLE SUPPLY: Performed by: ANESTHESIOLOGY

## 2019-07-09 PROCEDURE — 3600000015 HC SURGERY LEVEL 5 ADDTL 15MIN: Performed by: ANESTHESIOLOGY

## 2019-07-09 PROCEDURE — 6360000004 HC RX CONTRAST MEDICATION: Performed by: ANESTHESIOLOGY

## 2019-07-09 PROCEDURE — 62323 NJX INTERLAMINAR LMBR/SAC: CPT | Performed by: ANESTHESIOLOGY

## 2019-07-09 PROCEDURE — 3209999900 FLUORO FOR SURGICAL PROCEDURES

## 2019-07-09 RX ORDER — GABAPENTIN 300 MG/1
300 CAPSULE ORAL 2 TIMES DAILY
COMMUNITY
End: 2019-09-26 | Stop reason: ALTCHOICE

## 2019-07-09 RX ORDER — LIDOCAINE HYDROCHLORIDE 5 MG/ML
INJECTION, SOLUTION INFILTRATION; INTRAVENOUS PRN
Status: DISCONTINUED | OUTPATIENT
Start: 2019-07-09 | End: 2019-07-09 | Stop reason: ALTCHOICE

## 2019-07-09 ASSESSMENT — PAIN SCALES - GENERAL
PAINLEVEL_OUTOF10: 0
PAINLEVEL_OUTOF10: 0

## 2019-07-09 ASSESSMENT — PAIN - FUNCTIONAL ASSESSMENT: PAIN_FUNCTIONAL_ASSESSMENT: 0-10

## 2019-07-09 NOTE — H&P
[address] [facility], [address] [place] [address]  [phone]    Procedure History & Physical      Maxine Paige     HPI:    Patient  is here for LESi for Low back pain   Labs/imaging studies reviewed   All question and concerns addressed including R/B/A associated with the procedure    Past Medical History:   Diagnosis Date    Carpal tunnel syndrome, bilateral     L hand OR 7-2-19     Elevated hemoglobin ([address] 8/6/2018       Past Surgical History:   Procedure Laterality Date    APPENDECTOMY      CARPAL TUNNEL RELEASE Right 6/14/2019    RIGHT ENDOSCOPIC CARPAL TUNNEL RELEASE performed by Yary Vasquez MD at [address] Left 7/2/2019    LEFT ENDOSCOPIC CARPAL TUNNEL RELEASE performed by Yary Vasquez MD at [address] Right 1 22 13    CATARACT REMOVAL WITH IMPLANT Left 07/02/13    CHOLECYSTECTOMY  1980's    open    HYSTERECTOMY  1980's       Prior to Admission medications    Not on File       Allergies   Allergen Reactions    Hydrocodone-Acetaminophen Itching and Nausea Only       Social History     Socioeconomic History    Marital status:       Spouse name: Not on file    Number of children: Not on file    Years of education: Not on file    Highest education level: Not on file   Occupational History    Not on file   Social Needs    Financial resource strain: Not on file    Food insecurity:     Worry: Not on file     Inability: Not on file    Transportation needs:     Medical: Not on file     Non-medical: Not on file   Tobacco Use    Smoking status: Current Every Day Smoker     Packs/day: 1.00     Years: 60.00     Pack years: 60.00     Types: Cigarettes     Start date: 7/13/1955    Smokeless tobacco: Never Used   Substance and Sexual Activity    Alcohol use: No    Drug use: No    Sexual activity: Not on file   Lifestyle    Physical activity:     Days per week: Not on file     Minutes back/ LE pain for Compaella Sessions, MD

## 2019-07-10 ENCOUNTER — TREATMENT (OUTPATIENT)
Dept: PHYSICAL THERAPY | Age: 79
End: 2019-07-10
Payer: MEDICARE

## 2019-07-10 DIAGNOSIS — M43.16 SPONDYLOLISTHESIS, LUMBAR REGION: ICD-10-CM

## 2019-07-10 DIAGNOSIS — M51.36 DDD (DEGENERATIVE DISC DISEASE), LUMBAR: Primary | ICD-10-CM

## 2019-07-10 DIAGNOSIS — M48.061 SPINAL STENOSIS OF LUMBAR REGION WITHOUT NEUROGENIC CLAUDICATION: ICD-10-CM

## 2019-07-10 DIAGNOSIS — M47.817 LUMBOSACRAL SPONDYLOSIS WITHOUT MYELOPATHY: ICD-10-CM

## 2019-07-10 PROCEDURE — 97110 THERAPEUTIC EXERCISES: CPT

## 2019-07-15 ENCOUNTER — OFFICE VISIT (OUTPATIENT)
Dept: ORTHOPEDIC SURGERY | Age: 79
End: 2019-07-15

## 2019-07-15 VITALS
WEIGHT: 143.96 LBS | DIASTOLIC BLOOD PRESSURE: 77 MMHG | RESPIRATION RATE: 18 BRPM | HEIGHT: 64 IN | SYSTOLIC BLOOD PRESSURE: 123 MMHG | BODY MASS INDEX: 24.58 KG/M2 | TEMPERATURE: 98.4 F | HEART RATE: 80 BPM

## 2019-07-15 DIAGNOSIS — G56.03 BILATERAL CARPAL TUNNEL SYNDROME: Primary | ICD-10-CM

## 2019-07-15 PROCEDURE — 99024 POSTOP FOLLOW-UP VISIT: CPT | Performed by: ORTHOPAEDIC SURGERY

## 2019-07-17 ENCOUNTER — TREATMENT (OUTPATIENT)
Dept: PHYSICAL THERAPY | Age: 79
End: 2019-07-17
Payer: MEDICARE

## 2019-07-17 DIAGNOSIS — M43.16 SPONDYLOLISTHESIS, LUMBAR REGION: ICD-10-CM

## 2019-07-17 DIAGNOSIS — M48.061 SPINAL STENOSIS OF LUMBAR REGION WITHOUT NEUROGENIC CLAUDICATION: ICD-10-CM

## 2019-07-17 DIAGNOSIS — M51.36 DDD (DEGENERATIVE DISC DISEASE), LUMBAR: Primary | ICD-10-CM

## 2019-07-17 DIAGNOSIS — M47.817 LUMBOSACRAL SPONDYLOSIS WITHOUT MYELOPATHY: ICD-10-CM

## 2019-07-17 PROCEDURE — 97110 THERAPEUTIC EXERCISES: CPT | Performed by: PHYSICAL THERAPIST

## 2019-07-17 NOTE — PROGRESS NOTES
EXAM DESCRIPTION:  US - Scrotum Testicles - 3/16/2018 3:40 pm

 

CLINICAL HISTORY:  Pain.

 

COMPARISON:  None.

 

FINDINGS:  The right testicle 3.5 x 3.0 x 1.7 cm. No intratesticular masses or evidence of testicular
 torsion.

 

The left testicle 3.7 x 2.8 x 1.7 cm. No intratesticular masses or evidence of testicular torsion.

 

Both epididymides are normal in size and appearance.

 

No pathologic fluid collections.

 

 

 

IMPRESSION:  Unremarkable study. Physical Therapy Daily Treatment Note    Date: 2019  Patient Name: Jessica Pedroza  : 1940   MRN: [id number]  DOInjury: insidious onset   Referring Provider: No referring provider defined for this encounter. Medical Diagnosis:   M51.36 (ICD-10-CM) - DDD (degenerative disc disease), lumbar   M43.16 (ICD-10-CM) - Spondylolisthesis, lumbar region   M48.061 (ICD-10-CM) - Spinal stenosis of lumbar region without neurogenic claudication   M47.817 (ICD-10-CM) - Lumbosacral spondylosis without myelopathy       Outcome Measure:  Oswestry 46%    S: Pt c/o pain in feet but none in back. O:   Time 14:00-14:45     Visit 3 Repeat outcome measure at mid point and end. Pain 3/10     ROM      Modalities            Exercise      Nustep   L5 5 min Perform without arms d/t arthritis in hands TE   Seated LB stretch 3 x 10 sec HEP TE   Single knee to chest 5 x 5 sec bilateral \" TE   Trunk rotation 20x  \" TE   Bridge 2 x 10 \" TE   SLR 2 x 10 \" TE         Calf Raises 2 x 15 Circuit TE   Squats 2 x 15 \" TE   Marching 2 x 15 \" TE   Alt. Sidekicks 2 x 15 \" TE         Sit/Stands 10 in 30 seconds Hands on knees TE         Gait training       NMR Balance activities to aid in safe community and home ambulation    Marching Gait      Sidestepping      Retrowalk      Heel to toe      March on BOSU                  A:  Tolerated well. Above added to written HEP.   P: Continue with rehab plan  [address] [place] [address]    Treatment Charges: Mins Units   Initial Evaluation     Re-Evaluation     Ther Exercise         TE 30 2   Manual Therapy     MT     Ther Activities        TA     Gait Training          GT     Neuro Re-education NR     Modalities     Non-Billable Service Time     Other     Total Time/Units 30 2

## 2019-07-24 ENCOUNTER — TREATMENT (OUTPATIENT)
Dept: PHYSICAL THERAPY | Age: 79
End: 2019-07-24
Payer: MEDICARE

## 2019-07-24 DIAGNOSIS — M47.817 LUMBOSACRAL SPONDYLOSIS WITHOUT MYELOPATHY: ICD-10-CM

## 2019-07-24 DIAGNOSIS — M48.061 SPINAL STENOSIS OF LUMBAR REGION WITHOUT NEUROGENIC CLAUDICATION: ICD-10-CM

## 2019-07-24 DIAGNOSIS — M43.16 SPONDYLOLISTHESIS, LUMBAR REGION: ICD-10-CM

## 2019-07-24 DIAGNOSIS — M51.36 DDD (DEGENERATIVE DISC DISEASE), LUMBAR: Primary | ICD-10-CM

## 2019-07-24 PROCEDURE — 97110 THERAPEUTIC EXERCISES: CPT

## 2019-08-07 ENCOUNTER — OFFICE VISIT (OUTPATIENT)
Dept: PAIN MANAGEMENT | Age: 79
End: 2019-08-07
Payer: MEDICARE

## 2019-08-07 ENCOUNTER — PREP FOR PROCEDURE (OUTPATIENT)
Dept: PAIN MANAGEMENT | Age: 79
End: 2019-08-07

## 2019-08-07 VITALS
HEIGHT: 63 IN | WEIGHT: 140 LBS | SYSTOLIC BLOOD PRESSURE: 132 MMHG | RESPIRATION RATE: 16 BRPM | DIASTOLIC BLOOD PRESSURE: 76 MMHG | HEART RATE: 72 BPM | BODY MASS INDEX: 24.8 KG/M2 | TEMPERATURE: 97.6 F

## 2019-08-07 DIAGNOSIS — M48.061 SPINAL STENOSIS OF LUMBAR REGION WITHOUT NEUROGENIC CLAUDICATION: ICD-10-CM

## 2019-08-07 DIAGNOSIS — M51.36 DDD (DEGENERATIVE DISC DISEASE), LUMBAR: ICD-10-CM

## 2019-08-07 DIAGNOSIS — M48.061 SPINAL STENOSIS OF LUMBAR REGION WITHOUT NEUROGENIC CLAUDICATION: Primary | ICD-10-CM

## 2019-08-07 DIAGNOSIS — M43.16 SPONDYLOLISTHESIS, LUMBAR REGION: ICD-10-CM

## 2019-08-07 DIAGNOSIS — M47.817 LUMBOSACRAL SPONDYLOSIS WITHOUT MYELOPATHY: ICD-10-CM

## 2019-08-07 DIAGNOSIS — M51.36 DDD (DEGENERATIVE DISC DISEASE), LUMBAR: Primary | ICD-10-CM

## 2019-08-07 PROCEDURE — 99213 OFFICE O/P EST LOW 20 MIN: CPT | Performed by: ANESTHESIOLOGY

## 2019-08-07 PROCEDURE — 99214 OFFICE O/P EST MOD 30 MIN: CPT | Performed by: ANESTHESIOLOGY

## 2019-08-07 NOTE — PROGRESS NOTES
[address] [facility], [address] [place] [address] Phani  [phone]    Follow up Note      Jessica President     Date of Visit:  8/7/2019    CC:  Patient presents for follow up   Chief Complaint   Patient presents with    Follow-up    Back Pain     low back pain    Leg Pain     both legs       HPI:    H/o Low back and LE pain. S/P LESI which had helped her low back pain . Her main complaint today is pain in the bilateral lower extremity. Pain history and details documented in  Nursing notes reviewed. Has done PT since the last HÉCTOR and continues to do HEP. Takes Gabapentin for pain. She has not been on anticoagulation medications no and include .     She has not been on herbal supplements.       She is not diabetic. Imaging studies:  Xray LS Flex/ Ext: 7/2019:     Findings: There is normal lumbar lordosis. There is grade 1 anterolisthesis of   L4 on L5 which slightly reduces with extension. There is multilevel   intervertebral disc space narrowing. There is lower lumbar facet   arthrosis.         Impression   Grade 1 anterolisthesis of L4 on L5 which slightly reduces with   extension. MRI fo LS spine 5/2019:  FINDINGS:        BONE MARROW SIGNAL: There is normal marrow signal.       T12-L1: Normal T12-L1       L1-L2: Noncompressive disc bulge without central or foraminal   stenosis.       L2-L3: Moderate diffuse bulge and facet hypertrophy with mild canal   and foraminal stenosis.       L3-L4: Noncompressive disc bulge and facet hypertrophy with moderate   foraminal stenosis.       L4-L5: Grade 1 anterior listhesis noted with a moderate pseudobulge. Prominent facet hypertrophy and ligamentum flavum infolding noted with   moderate canal and lateral recess stenosis. Severe foraminal stenosis   noted.       L5-S1: Prominent facet hypertrophy noted with mild foraminal stenosis.    No canal stenosis.       SOFT TISSUES: The visualized paravertebral soft tissues are within   normal limits.           Impression   1. Grade 1 spondylolisthesis of L4 with moderate canal and lateral   recess stenosis. 2. Multilevel disc bulges as discussed. 3. No disc herniations identified. Potential Aberrant Drug-Related Behavior:   no    Urine Drug Screening: no    OARRS report::yes- 8/5/2019    Past Medical History:   Diagnosis Date    Carpal tunnel syndrome, bilateral     L hand OR 7-2-19     Elevated hemoglobin ([facility] [address] 8/6/2018       Past Surgical History:   Procedure Laterality Date    APPENDECTOMY      CARPAL TUNNEL RELEASE Right 6/14/2019    RIGHT ENDOSCOPIC CARPAL TUNNEL RELEASE performed by Ghassan Yanez MD at [facility] Left 7/2/2019    LEFT ENDOSCOPIC CARPAL TUNNEL RELEASE performed by Ghassan Yanez MD at [address] Right 1 22 13    CATARACT REMOVAL WITH IMPLANT Left 07/02/13    CHOLECYSTECTOMY  1980's    open    EPIDURAL STEROID INJECTION N/A 7/9/2019    LUMBAR EPIDURAL STEROID INJECTION L4-5 UNDER FLUOROSCOPIC GUIDANCE performed by Tess Cogan, MD at [address]  1980's    NERVE BLOCK N/A 07/09/2019    lumbar epidural L4-5       Prior to Admission medications    Medication Sig Start Date End Date Taking? Authorizing Provider   gabapentin (NEURONTIN) 300 MG capsule Take 300 mg by mouth 2 times daily. Yes Historical Provider, MD       Allergies   Allergen Reactions    Hydrocodone-Acetaminophen Itching and Nausea Only       Social History     Socioeconomic History    Marital status:       Spouse name: Not on file    Number of children: Not on file    Years of education: Not on file    Highest education level: Not on file   Occupational History    Not on file   Social Needs    Financial resource strain: Not on file    Food insecurity:     Worry: Not on file     Inability: Not on file    Transportation needs:     Medical: Not on file     Non-medical: Not reflex 2+  Right Quadriceps reflex 2+  Left Quadriceps reflex 2+  Right Achilles reflex 2+  Left Achilles reflex 2+     Gait:normal Yes     Dermatology:     Skin:no rashes or lesions noted      Assessment/Plan:   Diagnosis Orders   1. DDD (degenerative disc disease), lumbar     2. Lumbosacral spondylosis without myelopathy     3. Spinal stenosis of lumbar region without neurogenic claudication     4. Spondylolisthesis, lumbar region       Low back and B/l LE pain. S/p LESI with good pain relief fo the LE. Did PT since the last injection and Continues HEP. Lower extremity pain is significant. Will do Bilateral Lumbar TFESI L5 under fluoroscopic guidance. RBA discussed. Encouraged to Continue HEP.       Sharon Miller MD

## 2019-08-27 ENCOUNTER — OFFICE VISIT (OUTPATIENT)
Dept: FAMILY MEDICINE CLINIC | Age: 79
End: 2019-08-27
Payer: MEDICARE

## 2019-08-27 VITALS
BODY MASS INDEX: 24.63 KG/M2 | HEIGHT: 63 IN | OXYGEN SATURATION: 95 % | SYSTOLIC BLOOD PRESSURE: 120 MMHG | RESPIRATION RATE: 18 BRPM | DIASTOLIC BLOOD PRESSURE: 70 MMHG | HEART RATE: 81 BPM | WEIGHT: 139 LBS

## 2019-08-27 DIAGNOSIS — G89.29 CHRONIC BILATERAL LOW BACK PAIN WITH BILATERAL SCIATICA: ICD-10-CM

## 2019-08-27 DIAGNOSIS — E53.8 FOLIC ACID DEFICIENCY: ICD-10-CM

## 2019-08-27 DIAGNOSIS — M54.42 CHRONIC BILATERAL LOW BACK PAIN WITH BILATERAL SCIATICA: ICD-10-CM

## 2019-08-27 DIAGNOSIS — I73.9 INTERMITTENT CLAUDICATION (HCC): ICD-10-CM

## 2019-08-27 DIAGNOSIS — R20.2 PARESTHESIA OF BOTH LOWER EXTREMITIES: ICD-10-CM

## 2019-08-27 DIAGNOSIS — R29.898 COMPLAINTS OF WEAKNESS OF LOWER EXTREMITY: Primary | ICD-10-CM

## 2019-08-27 DIAGNOSIS — M54.41 CHRONIC BILATERAL LOW BACK PAIN WITH BILATERAL SCIATICA: ICD-10-CM

## 2019-08-27 DIAGNOSIS — I87.2 VENOUS INSUFFICIENCY (CHRONIC) (PERIPHERAL): ICD-10-CM

## 2019-08-27 PROCEDURE — G8400 PT W/DXA NO RESULTS DOC: HCPCS | Performed by: FAMILY MEDICINE

## 2019-08-27 PROCEDURE — G8427 DOCREV CUR MEDS BY ELIG CLIN: HCPCS | Performed by: FAMILY MEDICINE

## 2019-08-27 PROCEDURE — 1090F PRES/ABSN URINE INCON ASSESS: CPT | Performed by: FAMILY MEDICINE

## 2019-08-27 PROCEDURE — 99214 OFFICE O/P EST MOD 30 MIN: CPT | Performed by: FAMILY MEDICINE

## 2019-08-27 PROCEDURE — G8420 CALC BMI NORM PARAMETERS: HCPCS | Performed by: FAMILY MEDICINE

## 2019-08-27 PROCEDURE — 4040F PNEUMOC VAC/ADMIN/RCVD: CPT | Performed by: FAMILY MEDICINE

## 2019-08-27 PROCEDURE — 1123F ACP DISCUSS/DSCN MKR DOCD: CPT | Performed by: FAMILY MEDICINE

## 2019-08-27 PROCEDURE — 4004F PT TOBACCO SCREEN RCVD TLK: CPT | Performed by: FAMILY MEDICINE

## 2019-08-27 RX ORDER — FOLIC ACID 1 MG/1
1 TABLET ORAL DAILY
Qty: 30 TABLET | Refills: 3 | Status: SHIPPED | OUTPATIENT
Start: 2019-08-27 | End: 2019-09-26 | Stop reason: ALTCHOICE

## 2019-08-27 ASSESSMENT — PATIENT HEALTH QUESTIONNAIRE - PHQ9
1. LITTLE INTEREST OR PLEASURE IN DOING THINGS: 0
SUM OF ALL RESPONSES TO PHQ QUESTIONS 1-9: 0
SUM OF ALL RESPONSES TO PHQ QUESTIONS 1-9: 0
SUM OF ALL RESPONSES TO PHQ9 QUESTIONS 1 & 2: 0
2. FEELING DOWN, DEPRESSED OR HOPELESS: 0

## 2019-08-28 ASSESSMENT — ENCOUNTER SYMPTOMS
APNEA: 0
SHORTNESS OF BREATH: 0
ABDOMINAL PAIN: 0
SORE THROAT: 0
RHINORRHEA: 0
PHOTOPHOBIA: 0
ABDOMINAL DISTENTION: 0
CHEST TIGHTNESS: 0
TROUBLE SWALLOWING: 0
VOICE CHANGE: 0
BLOOD IN STOOL: 0
EYE PAIN: 0
SINUS PRESSURE: 0
DIARRHEA: 0
ANAL BLEEDING: 0
FACIAL SWELLING: 0
BACK PAIN: 1
CHOKING: 0
COUGH: 0
STRIDOR: 0
NAUSEA: 0
COLOR CHANGE: 0
EYE DISCHARGE: 0
RECTAL PAIN: 0
WHEEZING: 0
CONSTIPATION: 0
VOMITING: 0
EYE REDNESS: 0
EYE ITCHING: 0

## 2019-08-29 DIAGNOSIS — R20.2 PARESTHESIA OF BOTH LOWER EXTREMITIES: Primary | ICD-10-CM

## 2019-08-29 DIAGNOSIS — I73.9 INTERMITTENT CLAUDICATION (HCC): ICD-10-CM

## 2019-08-29 DIAGNOSIS — I73.9 PAD (PERIPHERAL ARTERY DISEASE) (HCC): ICD-10-CM

## 2019-08-29 DIAGNOSIS — R29.898 COMPLAINTS OF WEAKNESS OF LOWER EXTREMITY: ICD-10-CM

## 2019-08-30 ENCOUNTER — TELEPHONE (OUTPATIENT)
Dept: FAMILY MEDICINE CLINIC | Age: 79
End: 2019-08-30

## 2019-08-30 NOTE — TELEPHONE ENCOUNTER
I put a note into pool that said the arterial US showed no blockage and would not account for her symptoms of weakness. I set her up with vascular and neurology to further investigate her leg weakness.

## 2019-09-03 ENCOUNTER — HOSPITAL ENCOUNTER (OUTPATIENT)
Age: 79
Setting detail: OUTPATIENT SURGERY
Discharge: HOME | End: 2019-09-03
Attending: ANESTHESIOLOGY | Admitting: ANESTHESIOLOGY
Payer: MEDICARE

## 2019-09-03 ENCOUNTER — HOSPITAL ENCOUNTER (OUTPATIENT)
Dept: OPERATING ROOM | Age: 79
Setting detail: OUTPATIENT SURGERY
Discharge: HOME | End: 2019-09-03
Attending: ANESTHESIOLOGY
Payer: MEDICARE

## 2019-09-03 VITALS
OXYGEN SATURATION: 98 % | SYSTOLIC BLOOD PRESSURE: 147 MMHG | DIASTOLIC BLOOD PRESSURE: 84 MMHG | HEART RATE: 72 BPM | RESPIRATION RATE: 14 BRPM

## 2019-09-03 DIAGNOSIS — M51.9 LUMBAR DISC DISEASE: ICD-10-CM

## 2019-09-03 PROCEDURE — 3600000015 HC SURGERY LEVEL 5 ADDTL 15MIN: Performed by: ANESTHESIOLOGY

## 2019-09-03 PROCEDURE — 64484 NJX AA&/STRD TFRM EPI L/S EA: CPT | Performed by: ANESTHESIOLOGY

## 2019-09-03 PROCEDURE — 7100000010 HC PHASE II RECOVERY - FIRST 15 MIN: Performed by: ANESTHESIOLOGY

## 2019-09-03 PROCEDURE — 6360000002 HC RX W HCPCS: Performed by: ANESTHESIOLOGY

## 2019-09-03 PROCEDURE — 2709999900 HC NON-CHARGEABLE SUPPLY: Performed by: ANESTHESIOLOGY

## 2019-09-03 PROCEDURE — 6360000004 HC RX CONTRAST MEDICATION: Performed by: ANESTHESIOLOGY

## 2019-09-03 PROCEDURE — 3209999900 FLUORO FOR SURGICAL PROCEDURES

## 2019-09-03 PROCEDURE — 64483 NJX AA&/STRD TFRM EPI L/S 1: CPT | Performed by: ANESTHESIOLOGY

## 2019-09-03 PROCEDURE — 3600000005 HC SURGERY LEVEL 5 BASE: Performed by: ANESTHESIOLOGY

## 2019-09-03 PROCEDURE — 2500000003 HC RX 250 WO HCPCS: Performed by: ANESTHESIOLOGY

## 2019-09-03 RX ORDER — LIDOCAINE HYDROCHLORIDE 5 MG/ML
INJECTION, SOLUTION INFILTRATION; INTRAVENOUS PRN
Status: DISCONTINUED | OUTPATIENT
Start: 2019-09-03 | End: 2019-09-03 | Stop reason: ALTCHOICE

## 2019-09-03 ASSESSMENT — PAIN SCALES - GENERAL
PAINLEVEL_OUTOF10: 0
PAINLEVEL_OUTOF10: 0

## 2019-09-03 ASSESSMENT — PAIN DESCRIPTION - DESCRIPTORS: DESCRIPTORS: NUMBNESS

## 2019-09-03 ASSESSMENT — PAIN - FUNCTIONAL ASSESSMENT: PAIN_FUNCTIONAL_ASSESSMENT: 0-10

## 2019-09-03 NOTE — H&P
[address]  [address] [facility], [address] [place] [address] Phani  [phone]    Procedure History & Physical      Precilla Orris     HPI:    Patient  is here for Lumbar TFESI for Low back/LE pain. Labs/imaging studies reviewed   All question and concerns addressed including R/B/A associated with the procedure    Past Medical History:   Diagnosis Date    Carpal tunnel syndrome, bilateral     L hand OR 7-2-19     Elevated hemoglobin ([address] 8/6/2018       Past Surgical History:   Procedure Laterality Date    APPENDECTOMY      CARPAL TUNNEL RELEASE Right 6/14/2019    RIGHT ENDOSCOPIC CARPAL TUNNEL [facility] 7/[facility]    LEFT ENDOSCOPIC CARPAL TUNNEL RELEASE performed by Katt Puga MD at [address] Right 1 22 13    CATARACT REMOVAL WITH IMPLANT Left 07/02/13    CHOLECYSTECTOMY  1980's    open    EPIDURAL STEROID INJECTION N/A 7/9/2019    LUMBAR EPIDURAL STEROID INJECTION L4-5 UNDER FLUOROSCOPIC GUIDANCE performed by Lai Amado MD at [address]  1980's   [address] N/A 07/09/2019    lumbar epidural L4-5       Prior to Admission medications    Medication Sig Start Date End Date Taking? Authorizing Provider   folic acid (FOLVITE) 1 MG tablet Take 1 tablet by mouth daily 8/27/19   Clarita Pepper, DO   gabapentin (NEURONTIN) 300 MG capsule Take 300 mg by mouth 2 times daily. Historical Provider, MD       Allergies   Allergen Reactions    Hydrocodone-Acetaminophen Itching and Nausea Only       Social History     Socioeconomic History    Marital status:       Spouse name: Not on file    Number of children: Not on file    Years of education: Not on file    Highest education level: Not on file   Occupational History    Not on file   Social Needs    Financial resource strain: Not on file    Food insecurity:     Worry: Not on file Inability: Not on file    Transportation needs:     Medical: Not on file     Non-medical: Not on file   Tobacco Use    Smoking status: Current Every Day Smoker     Packs/day: 1.00     Years: 60.00     Pack years: 60.00     Types: Cigarettes     Start date: 7/13/1955    Smokeless tobacco: Never Used   Substance and Sexual Activity    Alcohol use: No    Drug use: No    Sexual activity: Not on file   Lifestyle    Physical activity:     Days per week: Not on file     Minutes per session: Not on file    Stress: Not on file   Relationships    Social connections:     Talks on phone: Not on file     Gets together: Not on file     Attends Sikhism service: Not on file     Active member of club or organization: Not on file     Attends meetings of clubs or organizations: Not on file     Relationship status: Not on file    Intimate partner violence:     Fear of current or ex partner: Not on file     Emotionally abused: Not on file     Physically abused: Not on file     Forced sexual activity: Not on file   Other Topics Concern    Not on file   Social History Narrative    Not on file       Family History   Problem Relation Age of Onset    Cancer Father          REVIEW OF SYSTEMS:    CONSTITUTIONAL:  negative for  fevers, chills, sweats and fatigue    RESPIRATORY:  negative for  dry cough, cough with sputum, dyspnea, wheezing and chest pain    CARDIOVASCULAR:  negative for chest pain, dyspnea, palpitations, syncope    GASTROINTESTINAL:  negative for nausea, vomiting, change in bowel habits, diarrhea, constipation and abdominal pain    MUSCULOSKELETAL: negative for muscle weakness    SKIN: negative for itching or rashes.     BEHAVIOR/PSYCH:  negative for poor appetite, increased appetite, decreased sleep and poor concentration    All other systems negative      PHYSICAL EXAM:    VITALS:  /77   Pulse 87   Resp 18   SpO2 95%     CONSTITUTIONAL:  awake, alert, cooperative, no apparent distress, and appears

## 2019-09-06 ENCOUNTER — TELEPHONE (OUTPATIENT)
Dept: ADMINISTRATIVE | Age: 79
End: 2019-09-06

## 2019-09-26 ENCOUNTER — APPOINTMENT (OUTPATIENT)
Dept: GENERAL RADIOLOGY | Age: 79
End: 2019-09-26
Payer: MEDICARE

## 2019-09-26 ENCOUNTER — HOSPITAL ENCOUNTER (EMERGENCY)
Age: 79
Discharge: HOME | End: 2019-09-26
Attending: EMERGENCY MEDICINE
Payer: MEDICARE

## 2019-09-26 VITALS
DIASTOLIC BLOOD PRESSURE: 81 MMHG | BODY MASS INDEX: 23.9 KG/M2 | TEMPERATURE: 97.6 F | SYSTOLIC BLOOD PRESSURE: 131 MMHG | RESPIRATION RATE: 20 BRPM | OXYGEN SATURATION: 96 % | WEIGHT: 140 LBS | HEIGHT: 64 IN | HEART RATE: 83 BPM

## 2019-09-26 DIAGNOSIS — S82.831A CLOSED FRACTURE OF DISTAL END OF RIGHT FIBULA, UNSPECIFIED FRACTURE MORPHOLOGY, INITIAL ENCOUNTER: Primary | ICD-10-CM

## 2019-09-26 PROCEDURE — 99283 EMERGENCY DEPT VISIT LOW MDM: CPT

## 2019-09-26 PROCEDURE — 73590 X-RAY EXAM OF LOWER LEG: CPT

## 2019-09-26 PROCEDURE — 73610 X-RAY EXAM OF ANKLE: CPT

## 2019-09-26 PROCEDURE — 29505 APPLICATION LONG LEG SPLINT: CPT

## 2019-09-26 ASSESSMENT — ENCOUNTER SYMPTOMS
PHOTOPHOBIA: 0
SINUS PAIN: 0
VOMITING: 0
RHINORRHEA: 0
DIARRHEA: 0
ABDOMINAL PAIN: 0
WHEEZING: 0
CONSTIPATION: 0
SHORTNESS OF BREATH: 0
SORE THROAT: 0
SINUS PRESSURE: 0
NAUSEA: 0
COUGH: 0

## 2019-09-26 ASSESSMENT — PAIN SCALES - GENERAL: PAINLEVEL_OUTOF10: 8

## 2019-09-26 ASSESSMENT — PAIN DESCRIPTION - LOCATION: LOCATION: ANKLE

## 2019-09-26 ASSESSMENT — PAIN DESCRIPTION - PAIN TYPE: TYPE: ACUTE PAIN

## 2019-09-26 ASSESSMENT — PAIN DESCRIPTION - ORIENTATION: ORIENTATION: RIGHT

## 2019-09-26 NOTE — ED PROVIDER NOTES
heard.  Pulmonary/Chest: Effort normal and breath sounds normal. No stridor. No respiratory distress. Abdominal: Soft. Bowel sounds are normal. She exhibits no distension. There is no tenderness. Musculoskeletal: She exhibits edema and tenderness. She exhibits no deformity. Decreased range of motion of the right ankle secondary to pain. There is generalized swelling of the right ankle. No obvious deformity. DP PT +2. No sensory deficit. There is a skin tear on the anterior aspect of the right distal tibia. Bleeding is controlled. Lymphadenopathy:     She has no cervical adenopathy. Neurological: She is alert and oriented to person, place, and time. No cranial nerve deficit. Coordination normal.   Skin: Skin is warm. Capillary refill takes less than 2 seconds. No rash noted. She is not diaphoretic. No erythema. Psychiatric: She has a normal mood and affect. Her behavior is normal.        Procedures     MDM              --------------------------------------------- PAST HISTORY ---------------------------------------------  Past Medical History:  has a past medical history of Carpal tunnel syndrome, bilateral and Elevated hemoglobin ([facility] [address] Past Surgical History:  has a past surgical history that includes Appendectomy; Cataract removal with implant (Right, 1 22 13); Cataract removal with implant (Left, 07/02/13); Cholecystectomy (1980's); Hysterectomy (1980's); Carpal tunnel release (Right, 6/14/2019); Carpal tunnel release (Left, 7/2/2019); Nerve Block (N/A, 07/09/2019); epidural steroid injection (N/A, 7/9/2019); Nerve Block (Bilateral, 09/03/2019); and epidural steroid injection (Bilateral, 9/3/2019). Social History:  reports that she has been smoking cigarettes. She started smoking about 64 years ago. She has a 60.00 pack-year smoking history. She has never used smokeless tobacco. She reports that she does not drink alcohol or use drugs.     Family History: family history includes Cancer in her father. The patients home medications have been reviewed. Allergies: Hydrocodone-acetaminophen    -------------------------------------------------- RESULTS -------------------------------------------------  Labs:  No results found for this visit on 09/26/19. Radiology:  XR TIBIA FIBULA RIGHT (2 VIEWS)   Final Result   Oblique nondisplaced fracture of the distal fibula. XR ANKLE RIGHT (MIN 3 VIEWS)   Final Result   1. Nondisplaced oblique fracture through the distal fibula. 2. Nondisplaced avulsion fracture from the anterior aspect of the   distal tibia.          ------------------------- NURSING NOTES AND VITALS REVIEWED ---------------------------  Date / Time Roomed:  9/26/2019  9:21 AM  ED Bed Assignment:  12/12    The nursing notes within the ED encounter and vital signs as below have been reviewed. /81   Pulse 83   Temp 97.6 °F (36.4 °C) (Oral)   Resp 20   Ht 5' 4\" (1.626 m)   Wt 140 lb (63.5 kg)   SpO2 96%   BMI 24.03 kg/m²   Oxygen Saturation Interpretation: Normal      ------------------------------------------ PROGRESS NOTES ------------------------------------------  I have spoken with the patient and discussed todays results, in addition to providing specific details for the plan of care and counseling regarding the diagnosis and prognosis. Their questions are answered at this time and they are agreeable with the plan. I discussed at length with them reasons for immediate return here for re evaluation. They will followup with primary care by calling their office tomorrow. --------------------------------- ADDITIONAL PROVIDER NOTES ---------------------------------  Patient updated on results. She has never seen ortho in the past, she will be given on call. She will be placed in splint and given crutches. Patient will be discharged home. She understands and has no further questions at this time.      At this time the patient is without objective evidence of an

## 2019-10-30 ENCOUNTER — OFFICE VISIT (OUTPATIENT)
Dept: PAIN MANAGEMENT | Age: 79
End: 2019-10-30
Payer: MEDICARE

## 2019-10-30 VITALS
HEIGHT: 63 IN | RESPIRATION RATE: 18 BRPM | DIASTOLIC BLOOD PRESSURE: 64 MMHG | SYSTOLIC BLOOD PRESSURE: 122 MMHG | TEMPERATURE: 98.6 F | OXYGEN SATURATION: 98 % | WEIGHT: 140 LBS | BODY MASS INDEX: 24.8 KG/M2 | HEART RATE: 98 BPM

## 2019-10-30 DIAGNOSIS — M51.36 DDD (DEGENERATIVE DISC DISEASE), LUMBAR: ICD-10-CM

## 2019-10-30 DIAGNOSIS — F17.200 SMOKING: ICD-10-CM

## 2019-10-30 DIAGNOSIS — M47.817 LUMBOSACRAL SPONDYLOSIS WITHOUT MYELOPATHY: ICD-10-CM

## 2019-10-30 DIAGNOSIS — M48.061 SPINAL STENOSIS OF LUMBAR REGION WITHOUT NEUROGENIC CLAUDICATION: Primary | ICD-10-CM

## 2019-10-30 PROCEDURE — 4004F PT TOBACCO SCREEN RCVD TLK: CPT | Performed by: ANESTHESIOLOGY

## 2019-10-30 PROCEDURE — G8427 DOCREV CUR MEDS BY ELIG CLIN: HCPCS | Performed by: ANESTHESIOLOGY

## 2019-10-30 PROCEDURE — 99213 OFFICE O/P EST LOW 20 MIN: CPT | Performed by: ANESTHESIOLOGY

## 2019-10-30 PROCEDURE — 1123F ACP DISCUSS/DSCN MKR DOCD: CPT | Performed by: ANESTHESIOLOGY

## 2019-10-30 PROCEDURE — G8420 CALC BMI NORM PARAMETERS: HCPCS | Performed by: ANESTHESIOLOGY

## 2019-10-30 PROCEDURE — 4040F PNEUMOC VAC/ADMIN/RCVD: CPT | Performed by: ANESTHESIOLOGY

## 2019-10-30 PROCEDURE — 1090F PRES/ABSN URINE INCON ASSESS: CPT | Performed by: ANESTHESIOLOGY

## 2019-10-30 PROCEDURE — G8484 FLU IMMUNIZE NO ADMIN: HCPCS | Performed by: ANESTHESIOLOGY

## 2019-10-30 PROCEDURE — G8400 PT W/DXA NO RESULTS DOC: HCPCS | Performed by: ANESTHESIOLOGY

## 2019-10-30 RX ORDER — GABAPENTIN 300 MG/1
300 CAPSULE ORAL 2 TIMES DAILY
COMMUNITY
End: 2020-05-22 | Stop reason: SDUPTHER

## 2019-10-30 RX ORDER — FOLIC ACID 1 MG/1
TABLET ORAL
Refills: 3 | COMMUNITY
Start: 2019-10-09 | End: 2020-05-18

## 2019-11-29 ENCOUNTER — TELEPHONE (OUTPATIENT)
Dept: PHARMACY | Facility: CLINIC | Age: 79
End: 2019-11-29

## 2019-12-02 DIAGNOSIS — M80.00XA AGE-RELATED OSTEOPOROSIS WITH CURRENT PATHOLOGICAL FRACTURE, INITIAL ENCOUNTER: Primary | ICD-10-CM

## 2019-12-05 ENCOUNTER — PREP FOR PROCEDURE (OUTPATIENT)
Dept: PAIN MANAGEMENT | Age: 79
End: 2019-12-05

## 2019-12-10 ENCOUNTER — HOSPITAL ENCOUNTER (OUTPATIENT)
Dept: OPERATING ROOM | Age: 79
Setting detail: OUTPATIENT SURGERY
Discharge: HOME | End: 2019-12-10
Attending: ANESTHESIOLOGY
Payer: MEDICARE

## 2019-12-10 ENCOUNTER — HOSPITAL ENCOUNTER (OUTPATIENT)
Age: 79
Setting detail: OUTPATIENT SURGERY
Discharge: HOME | End: 2019-12-10
Attending: ANESTHESIOLOGY | Admitting: ANESTHESIOLOGY
Payer: MEDICARE

## 2019-12-10 VITALS
HEART RATE: 88 BPM | OXYGEN SATURATION: 98 % | RESPIRATION RATE: 14 BRPM | SYSTOLIC BLOOD PRESSURE: 136 MMHG | DIASTOLIC BLOOD PRESSURE: 94 MMHG

## 2019-12-10 DIAGNOSIS — M51.9 LUMBAR DISC DISEASE: ICD-10-CM

## 2019-12-10 PROCEDURE — 3600000005 HC SURGERY LEVEL 5 BASE: Performed by: ANESTHESIOLOGY

## 2019-12-10 PROCEDURE — 6360000004 HC RX CONTRAST MEDICATION: Performed by: ANESTHESIOLOGY

## 2019-12-10 PROCEDURE — 3209999900 FLUORO FOR SURGICAL PROCEDURES

## 2019-12-10 PROCEDURE — 2709999900 HC NON-CHARGEABLE SUPPLY: Performed by: ANESTHESIOLOGY

## 2019-12-10 PROCEDURE — 2500000003 HC RX 250 WO HCPCS: Performed by: ANESTHESIOLOGY

## 2019-12-10 PROCEDURE — 7100000011 HC PHASE II RECOVERY - ADDTL 15 MIN: Performed by: ANESTHESIOLOGY

## 2019-12-10 PROCEDURE — 64484 NJX AA&/STRD TFRM EPI L/S EA: CPT | Performed by: ANESTHESIOLOGY

## 2019-12-10 PROCEDURE — 6360000002 HC RX W HCPCS: Performed by: ANESTHESIOLOGY

## 2019-12-10 PROCEDURE — 64483 NJX AA&/STRD TFRM EPI L/S 1: CPT | Performed by: ANESTHESIOLOGY

## 2019-12-10 PROCEDURE — 7100000010 HC PHASE II RECOVERY - FIRST 15 MIN: Performed by: ANESTHESIOLOGY

## 2019-12-10 RX ORDER — LIDOCAINE HYDROCHLORIDE 5 MG/ML
INJECTION, SOLUTION INFILTRATION; INTRAVENOUS PRN
Status: DISCONTINUED | OUTPATIENT
Start: 2019-12-10 | End: 2019-12-10 | Stop reason: ALTCHOICE

## 2019-12-10 ASSESSMENT — PAIN SCALES - GENERAL: PAINLEVEL_OUTOF10: 0

## 2019-12-10 ASSESSMENT — PAIN DESCRIPTION - DESCRIPTORS: DESCRIPTORS: ACHING

## 2019-12-10 ASSESSMENT — PAIN - FUNCTIONAL ASSESSMENT: PAIN_FUNCTIONAL_ASSESSMENT: 0-10

## 2020-01-24 ENCOUNTER — TELEPHONE (OUTPATIENT)
Dept: ORTHOPEDIC SURGERY | Age: 80
End: 2020-01-24

## 2020-01-28 ENCOUNTER — OFFICE VISIT (OUTPATIENT)
Dept: ORTHOPEDIC SURGERY | Age: 80
End: 2020-01-28
Payer: MEDICARE

## 2020-01-28 VITALS
DIASTOLIC BLOOD PRESSURE: 73 MMHG | SYSTOLIC BLOOD PRESSURE: 124 MMHG | TEMPERATURE: 98 F | BODY MASS INDEX: 25.76 KG/M2 | HEART RATE: 85 BPM | WEIGHT: 140 LBS | RESPIRATION RATE: 18 BRPM | HEIGHT: 62 IN

## 2020-01-28 PROCEDURE — G8400 PT W/DXA NO RESULTS DOC: HCPCS | Performed by: ORTHOPAEDIC SURGERY

## 2020-01-28 PROCEDURE — G8484 FLU IMMUNIZE NO ADMIN: HCPCS | Performed by: ORTHOPAEDIC SURGERY

## 2020-01-28 PROCEDURE — 99213 OFFICE O/P EST LOW 20 MIN: CPT | Performed by: ORTHOPAEDIC SURGERY

## 2020-01-28 PROCEDURE — G8427 DOCREV CUR MEDS BY ELIG CLIN: HCPCS | Performed by: ORTHOPAEDIC SURGERY

## 2020-01-28 PROCEDURE — G8417 CALC BMI ABV UP PARAM F/U: HCPCS | Performed by: ORTHOPAEDIC SURGERY

## 2020-01-28 PROCEDURE — 1123F ACP DISCUSS/DSCN MKR DOCD: CPT | Performed by: ORTHOPAEDIC SURGERY

## 2020-01-28 PROCEDURE — 4040F PNEUMOC VAC/ADMIN/RCVD: CPT | Performed by: ORTHOPAEDIC SURGERY

## 2020-01-28 PROCEDURE — 4004F PT TOBACCO SCREEN RCVD TLK: CPT | Performed by: ORTHOPAEDIC SURGERY

## 2020-01-28 PROCEDURE — 1090F PRES/ABSN URINE INCON ASSESS: CPT | Performed by: ORTHOPAEDIC SURGERY

## 2020-01-28 RX ORDER — METHYLPREDNISOLONE 4 MG/1
TABLET ORAL
Qty: 1 KIT | Refills: 0 | Status: SHIPPED | OUTPATIENT
Start: 2020-01-28 | End: 2020-02-03

## 2020-01-28 NOTE — PROGRESS NOTES
Department of Orthopedic Surgery  [facility] Enoc Dominguez MD  History and Physical      CHIEF COMPLAINT:  Bilateral hand pain     HISTORY OF PRESENT ILLNESS:                The patient is a [de-identified] y.o. female who presents with bilateral hand pain that started in November of 2019. She did have carpal tunnel release done in June and July 2019 on both hands. She said she was doing well after the surgery until November when diffuse bilateral hand pain started. She says it is getting progressively worse. She says it feels different from her carpal tunnel symptoms and it is more of an achy pain rather than numbness and tingling. She can not isolate any specific area that causes her pain in the hands. She has not tried anything for the pain at this time.      Past Medical History:        Diagnosis Date    Carpal tunnel syndrome, bilateral     L hand OR 7-2-19     Elevated hemoglobin ([facility] [address] 8/6/2018     Past Surgical History:        Procedure Laterality Date    ANESTHESIA NERVE BLOCK Bilateral 12/10/2019    RIGHT L5 AND LEFT L4 TRANSFORAMINAL LUMBAR EPIDURAL UNDER FLUOROSCOPY (CPT 19456) performed by Megan Hurtado MD at [address] Right 6/14/2019    RIGHT ENDOSCOPIC CARPAL TUNNEL RELEASE performed by Kanchan Gonzales MD at [facility] Left 7/2/2019    LEFT ENDOSCOPIC CARPAL TUNNEL RELEASE performed by Kanchan Gonzales MD at [facility] [address] Right 1 22 13    CATARACT REMOVAL WITH IMPLANT Left 07/02/13    CHOLECYSTECTOMY  1980's    open    EPIDURAL STEROID INJECTION N/A 7/9/2019    LUMBAR EPIDURAL STEROID INJECTION L4-5 UNDER FLUOROSCOPIC GUIDANCE performed by Megan Hurtado MD at [place] [address] Bilateral 9/3/2019    ATTAMPTED BILATERAL LUMBAR TRANSFORAMINAL EPIDURAL STEROID INJECTION LEFT L4 AND RIGHT  L5 UNDER FLUOROSCOPIC GUIDANCE performed by Megan Hurtado MD at [address] [place] [address] oriented to name, place and time. Cranial nerves II-XII are grossly intact. Motor is 5 out of 5 bilaterally. Sensory is intact. gait is normal.  MUSCULOSKELETAL:  Left upper extremity  Full ROM at the shoulder and elbow, No tenderness at the elbow, negative tinel at the elbow. Full ROM at the wrist. She has heberdens nodules in left index DIP. She is able to flex and extend all digits. At the DIP, PIP, MCP. Negative tinel at wrist , negative CMC grind, Negative gurdeep. No pain over the A1 pulley of any digit. Negative finkelstein. Right upper extremity   Full ROM at the shoulder and elbow, No tenderness at the elbow, negative tinel at the elbow. Full ROM at the wrist. She has heberdens nodules in left index DIP. She does have a contracture of the DIP of the right index finger to 35 degrees  She is able to flex and extend all digits. At the DIP, PIP, MCP. Negative tinel at wrist , negative CMC grind, Negative gurdeep. No pain over the A1 pulley of any digit. Negative finkelstein. DATA:    CBC:   Lab Results   Component Value Date    WBC 9.0 04/16/2019    RBC 5.61 04/16/2019    HGB 18.9 04/16/2019    HCT 55.4 04/16/2019    MCV 98.8 04/16/2019    MCH 33.7 04/16/2019    MCHC 34.1 04/16/2019    RDW 12.9 04/16/2019     04/16/2019    MPV 8.8 04/16/2019     PT/INR:  No results found for: PROTIME, INR    Radiology Review:  Xray right hand AP, lateral and oblique obtained and reviewed in office today demonstrating diffuse osteoarthritic changes throughout the hand. 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 And at the DIP of the index finger  Impression diffuse arthritis, CMC arthritis     Xray left hand AP, lateral and oblique obtained and reviewed in office today demonstrating diffuse osteoarthritic changes throughout the hand.  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

## 2020-02-14 ENCOUNTER — OFFICE VISIT (OUTPATIENT)
Dept: PHYSICAL MEDICINE AND REHAB | Age: 80
End: 2020-02-14
Payer: MEDICARE

## 2020-02-14 VITALS — WEIGHT: 140 LBS | HEIGHT: 64 IN | BODY MASS INDEX: 23.9 KG/M2

## 2020-02-14 PROCEDURE — 95886 MUSC TEST DONE W/N TEST COMP: CPT | Performed by: PHYSICAL MEDICINE & REHABILITATION

## 2020-02-14 PROCEDURE — 95913 NRV CNDJ TEST 13/> STUDIES: CPT | Performed by: PHYSICAL MEDICINE & REHABILITATION

## 2020-02-14 NOTE — PROGRESS NOTES
Radial Thumb 3.28 5.9 10.9 Median - Ulnar 14 0.31 32.8      Median Ring 3.96 7.0 12.6 Median palm - Ulnar palm 8 0.16 33.1      Ulnar Ring 3.65 5.4 1.1          Median palm Wrist 2.45 14.7 22.1          Ulnar palm Wrist 2.29 6.0 5.6          CSI     CSI  0.47    L Median - CSI      Median Thumb 3.54 10.5 18.1 Median - Radial 10 1.15 32.6      Radial Thumb 2.40 7.7 7.9 Median - Ulnar 14        CSI     CSI  1.15*        Motor NCS      Nerve / Sites Muscle Onset Amplitude Segments Distance Velocity Temp.     ms mV  cm m/s °C   R Median - APB      Palm APB 2.03 4.5 Palm - APB   33.7      Wrist APB 4.38 3.0 Wrist - Palm 8 34* 33.7      Elbow APB 8.13 2.4 Elbow - Wrist 19.5 52 33.7   L Median - APB      Palm APB 1.72 6.2 Palm - APB   32.4      Wrist APB 4.27 4.3 Wrist - Palm 8 31* 32.4      Elbow APB 7.60 3.5 Elbow - Wrist 17 51 32.4   R Ulnar - ADM      Wrist ADM 3.13 9.7 Wrist - ADM 8  33.6      B. Elbow ADM 6.41 9.4 B. Elbow - Wrist 16.5 50 33.6      A. Elbow ADM 9.01 8.8 A. Elbow - B. Elbow 10 38* 33.6   L Ulnar - ADM      Wrist ADM 3.13 9.4 Wrist - ADM 8  33.6      B. Elbow ADM 6.61 8.9 B. Elbow - Wrist 18 52 33.6      A. Elbow ADM 9.22 8.6 A. Elbow - B. Elbow 10 38* 33.6   R Ulnar - FDI      Wrist FDI 4.27 5.3 Wrist - FDI   32.9      B. Elbow FDI 7.50 5.3 B. Elbow - Wrist 17 53 32.9      A. Elbow FDI 10.26 5.1 A. Elbow - B. Elbow 10 36* 32.9   L Ulnar - FDI      Wrist FDI 4.01 7.8 Wrist - FDI   33      B. Elbow FDI 7.34 7.6 B. Elbow - Wrist 18 54 33      A. Elbow FDI 9.64 7.6 A. Elbow - B. Elbow 10 44* 33       F  Wave      Nerve Fmin % F    ms %   R Median - APB 29.48 40   R Ulnar - ADM 29.27 100   L Median - APB 28.49 50   L Ulnar - ADM 30.00 90       EMG      EMG Summary Table     Spontaneous MUAP Recruitment   Muscle Nerve Roots IA Fib PSW Fasc Amp Dur. PPP Pattern   L. Biceps brachii Musculocutaneous C5-C6 N None None None N N N N   L. Triceps brachii Radial C6-C8 N None None None N N N N   L.  Pronator teres Median C6-C7 N None None None N N N N   L. Flexor digitorum profundus, dig 4 & 5 Ulnar C8-T1 N None None None N N N N   L. First dorsal interosseous Ulnar C8-T1 N None None None N N N N   L. Abductor pollicis brevis Median Q9-G2 N None None None N N N Reduced   L. Cervical paraspinals (low)  - N None None None N N N N   L. Cervical paraspinals (mid)  - N None None None N N N N   R. Cervical paraspinals (mid)  - N None None None N N N N   R. Cervical paraspinals (low)  - N None None None N N N N   R. Biceps brachii Musculocutaneous C5-C6 N None None None N N N N   R. Triceps brachii Radial C6-C8 N None None None N N N N   R. Pronator teres Median C6-C7 N None None None N N N N   R. Flexor digitorum profundus, dig 4 & 5 Ulnar C8-T1 N None None None N N N N   R. First dorsal interosseous Ulnar C8-T1 N None None None N N N N   R. Abductor pollicis brevis Median O6-Z4 N None None None N N N Reduced          Study Limitations:  none    Summary of Findings:   Nerve conduction studies:   · The following nerve conduction studies were abnormal:   · The left combined sensory index is abnormal.   · The right ulnar sensory amplitude is decreased. · The bilateral median motor studies recording abductor pollicis brevis  Demonstrated focal slowing of conduction velocity across the wrists. The bilateral ulnar motor studies recording both the first dorsal interossei and the Abductor digiti minimi demonstrated focal slowing of conduction velocity across the wrist.   · All other nerve conduction studies listed in the table above were normal in latency, amplitude and conduction velocity. Needle EMG:   · Needle EMG was performed using a concentric needle. · The following abnormalities were seen on needle EMG: reduced motor unit recruitment to the bilateral abductor pollicis brevis. All other muscles tested, as listed in the table above demonstrated normal amplitude, duration, phases and recruitment and no active denervation signs were seen. Diagnostic Interpretation: This study was abnormal.     Electrodiagnosis: There is electrodiagnostic evidence of a median mononeuropathy. · Location: bilateral at the wrist.   · Nature: [  ] Axonal   Mel.Fester  ] Demyelinating  [  ] Mixed axonal and demyelinating     [  ] Sensory Mel.Fester  ] Motor  On R              [  X] Mixed sensorimotor on L     [  ] with active denervation       [ X ] without active denervation  · Duration: Subacute  · Severity: moderate  · Prognosis: Good. The prognosis for recovery of demyelinating lesions is good if the cause is alleviated. Electrodiagnosis: There is electrodiagnostic evidence of a ulnar neuropathy. · Location: bilateral at the elbow. · Nature: [  ] Axonal   [X] Demyelinating on left  [ X ] Mixed axonal and demyelinating on right     [  ] Sensory [ X ] Motor on left              [ X ] Mixed sensorimotor on right      [  ] with active denervation       [  ] without active denervation  · Duration: Acute  · Severity: moderate  · Prognosis: Good on left, Fair on right. The prognosis for recovery of demyelinating lesions is good if the cause is alleviated. The prognosis for recovery of axonal lesions is poor and dependant on collateral sprouting and reinnervation. ·   Previous Study: Compared to 7/31/2018  The left median sensory latency has  improved but the motor study is similar to prior and overall severity of the median mononeuropathy is similar. The right ulnar neuropathy is new. Left ulnar studies were not performed on prior exam for comparison. Follow up EMG is recommended if symptoms persist in one year. Technologist: Margret Lunsford LPN, CNCT   Physician:    Gisel Rich D.O., P.T.   Board Certified Physical Medicine and Rehabilitation  Board Certified Electrodiagnostic Medicine      Nerve conduction studies and electromyography were performed according to our laboratory policies and procedures which can be provided upon request. All abnormal values are identified in the table.  Laboratory normal values can also be provided upon request.       Cc: MD Erin Porras, DO

## 2020-02-14 NOTE — PATIENT INSTRUCTIONS
Electrodiagnotic Laboratory  Accredited by the [employer] with Exemplary status  BESSIE Arevalo D.O. [facility]  [address] [place]  Phone: [phone]  Fax: [phone]        Today you had an electrodiagnostic exam which included nerve conduction studies (NCS) and electromyography (EMG). This test evaluated the electrical activity of your nerves and muscles to help determine if you have a nerve or muscle disease. This test can help determine the location and type of a nerve or muscle problem. This will help your referring doctor diagnose your condition and determine the appropriate next step in your treatment plan. After your test:    1. There are no long lasting side effects of the test.     2. You may resume your normal activities without restrictions. 3.  Resume any medications that were stopped for the test.     4  If you have sore areas or bruising in your muscles where the needle was placed, apply a cold pack to the sore area for 15-20 minutes three to four times a day as needed for pain. The soreness should go away in about 1-2 days. 5. Your results were provided  Briefly at the end of your test and the final detailed report will be provided to your referring physician, and/or primary care physician and any other parties you requested within 1-2 days of the examination. You may wish to contact your referring provider after a few days to determine what they would like you to do next. 6.  Please call [phone] with any questions or concerns and if you develop increased body temperature/fever, swelling, tenderness, increased pain and/or drainage from the sites where the needle was placed. Thank you for choosing us for your health care needs.

## 2020-02-25 ENCOUNTER — OFFICE VISIT (OUTPATIENT)
Dept: ORTHOPEDIC SURGERY | Age: 80
End: 2020-02-25
Payer: MEDICARE

## 2020-02-25 VITALS
BODY MASS INDEX: 23.9 KG/M2 | WEIGHT: 140 LBS | SYSTOLIC BLOOD PRESSURE: 122 MMHG | HEIGHT: 64 IN | HEART RATE: 81 BPM | DIASTOLIC BLOOD PRESSURE: 76 MMHG

## 2020-02-25 PROCEDURE — G8420 CALC BMI NORM PARAMETERS: HCPCS | Performed by: ORTHOPAEDIC SURGERY

## 2020-02-25 PROCEDURE — 4040F PNEUMOC VAC/ADMIN/RCVD: CPT | Performed by: ORTHOPAEDIC SURGERY

## 2020-02-25 PROCEDURE — 99214 OFFICE O/P EST MOD 30 MIN: CPT | Performed by: ORTHOPAEDIC SURGERY

## 2020-02-25 PROCEDURE — 1090F PRES/ABSN URINE INCON ASSESS: CPT | Performed by: ORTHOPAEDIC SURGERY

## 2020-02-25 PROCEDURE — G8484 FLU IMMUNIZE NO ADMIN: HCPCS | Performed by: ORTHOPAEDIC SURGERY

## 2020-02-25 PROCEDURE — G8427 DOCREV CUR MEDS BY ELIG CLIN: HCPCS | Performed by: ORTHOPAEDIC SURGERY

## 2020-02-25 PROCEDURE — 1123F ACP DISCUSS/DSCN MKR DOCD: CPT | Performed by: ORTHOPAEDIC SURGERY

## 2020-02-25 PROCEDURE — G8400 PT W/DXA NO RESULTS DOC: HCPCS | Performed by: ORTHOPAEDIC SURGERY

## 2020-02-25 PROCEDURE — 4004F PT TOBACCO SCREEN RCVD TLK: CPT | Performed by: ORTHOPAEDIC SURGERY

## 2020-02-25 NOTE — PROGRESS NOTES
RIGHT ENDOSCOPIC CARPAL TUNNEL RELEASE performed by Kylie Melo MD at [address] Left 7/2/2019    LEFT ENDOSCOPIC CARPAL TUNNEL RELEASE performed by Klyie Melo MD at [address] Right 1 22 13    CATARACT REMOVAL WITH IMPLANT Left 07/02/13    CHOLECYSTECTOMY  1980's    open    EPIDURAL STEROID INJECTION N/A 7/9/2019    LUMBAR EPIDURAL STEROID INJECTION L4-5 UNDER FLUOROSCOPIC GUIDANCE performed by Jose Villanueva MD at [facility] Bilateral 9/3/2019    ATTAMPTED BILATERAL LUMBAR TRANSFORAMINAL EPIDURAL STEROID INJECTION LEFT L4 AND RIGHT  L5 UNDER FLUOROSCOPIC GUIDANCE performed by Jose Villanueva MD at [facility]  1980's    NERVE BLOCK N/A 07/09/2019    lumbar epidural L4-5    NERVE BLOCK Bilateral 09/03/2019    Left L4, Right L5    NERVE BLOCK Right 12/10/2019    right L5 and left L4 transforaminal      Current Medications:   No current facility-administered medications for this visit. Allergies:  Hydrocodone-acetaminophen    Social History:   TOBACCO:   reports that she has been smoking cigarettes. She started smoking about 64 years ago. She has a 60.00 pack-year smoking history. She has never used smokeless tobacco.  ETOH:   reports no history of alcohol use. DRUGS:   reports no history of drug use.   ACTIVITIES OF DAILY LIVING:    OCCUPATION:    Family History:       Problem Relation Age of Onset    Cancer Father        REVIEW OF SYSTEMS:  CONSTITUTIONAL:  negative  EYES:  negative  HEENT:  negative  RESPIRATORY:  negative  CARDIOVASCULAR:  negative  GASTROINTESTINAL:  negative  GENITOURINARY:  negative  INTEGUMENT/BREAST:  negative  HEMATOLOGIC/LYMPHATIC:  negative  ALLERGIC/IMMUNOLOGIC:  negative  ENDOCRINE:  negative  MUSCULOSKELETAL:  negative  NEUROLOGICAL:  negative  BEHAVIOR/PSYCH:  negative    PHYSICAL EXAM:    VITALS:  /76 (Site: Right Upper Arm, Position: Sitting)   Pulse 81   Ht 5' 4\" (1.626 m)   Wt 140 lb (63.5 kg)   BMI 24.03 kg/m²   CONSTITUTIONAL:  awake, alert, cooperative, no apparent distress, and appears stated age  EYES:  Lids and lashes normal, pupils equal, round and reactive to light, extra ocular muscles intact, sclera clear, conjunctiva normal  ENT:  Normocephalic, without obvious abnormality, atraumatic, sinuses nontender on palpation, external ears without lesions, oral pharynx with moist mucus membranes, tonsils without erythema or exudates, gums normal and good dentition. NECK:  Supple, symmetrical, trachea midline, no adenopathy, thyroid symmetric, not enlarged and no tenderness, skin normal  LUNGS: CTA  CARDIOVASCULAR: 2+ radial pulses, extremities warm and well perfused  ABDOMEN: NTTP  NEUROLOGIC:  Awake, alert, oriented to name, place and time. Cranial nerves II-XII are grossly intact. Motor is 5 out of 5 bilaterally. Sensory is intact. gait is normal.  MUSCULOSKELETAL:    Left upper extremity  Full ROM at the shoulder and elbow, No tenderness at the elbow, negative tinel at the elbow. Full ROM at the wrist. She has heberdens nodules in left index DIP. She is able to flex and extend all digits. At the DIP, PIP, MCP. Negative tinel at wrist , negative CMC grind, Negative gurdeep. No pain over the A1 pulley of any digit. Negative finkelstein. Right upper extremity   Full ROM at the shoulder and elbow, No tenderness at the elbow, negative tinel at the elbow. Full ROM at the wrist. She has heberdens nodules in left index DIP. She does have a contracture of the DIP of the right index finger to 35 degrees  She is able to flex and extend all digits. At the DIP, PIP, MCP. Negative tinel at wrist , negative CMC grind, Negative gurdeep. No pain over the A1 pulley of any digit. Negative finkelstein.        DATA:    CBC:   Lab Results   Component Value Date    WBC 9.0 04/16/2019    RBC 5.61 04/16/2019    HGB 18.9 04/16/2019    HCT 55.4 04/16/2019 MCV 98.8 04/16/2019    MCH 33.7 04/16/2019    MCHC 34.1 04/16/2019    RDW 12.9 04/16/2019     04/16/2019    MPV 8.8 04/16/2019     PT/INR:  No results found for: PROTIME, INR    Radiology Review:  Xray of bilateral elbows were obtained and reviewed in office today demonstrating no acute fractures or dislocations. Soft tissues within normal limits  Impression: no acute fractures or dislocations    IMPRESSION:  · Diffuse osteoarthritis bilateral hands   · Diffuse nonspecific hand pain  · CMC arthritis bilaterally   · S/P carpal tunnel release bilateral wrist  · Bilateral ulnar neuropathy    PLAN:    Discussed findings with the patient. Discussed she has nonspecific hand pain. Her EMG nerve conduction study test does show ulnar neuropathy which is new on the right and was not tested previously on the left. She does not have any provocative findings over the cubital tunnel at today's visit. Did discuss an ulnar nerve decompression has an unknown prognosis secondary to no specific provocative findings today. Patient was offered diclofenac gel. This was provided. She was also offered braces. Patient did not want any braces. Surgery was discussed with the patient. Patient would like to proceed with surgery along with being sent to rheumatologist for further evaluation. We will plan for a right ulnar nerve decompression at the elbow with possible transposition. Postoperative course explained to the patient. Patient like to proceed. All questions answered. I explained the risks, benefits, alternatives and complications of surgery with the patient including but not limited to the risks of infection, possible damage to nerves, vessels, or tendons, stiffness, loss of range of motion, scar sensitivity, wound healing complications, worsening symptoms, possible need for therapy, as well as the possible need further surgery and unanticipated complications.   The patient voiced understanding and all questions were answered. The patient elected to proceed with surgical intervention. I have seen and evaluated the patient and agree with the above assessment and plan on today's visit. I have performed the key components of the history and physical examination with significant findings of diffuse nonspecific non-localizable bilateral hand pain. Bilateral EMG nerve conduction study demonstrated significant slowing across the bilateral cubital tunnels of 38 m/s. After extensive discussion with the patient as well as the patient's daughter at today's visit patient would like to proceed with ulnar nerve decompression at elbow. She understands that the prognosis for recovery is somewhat guarded. However she relates that her symptoms are severe enough she like to proceed with surgery. All questions answered. I concur with the findings and plan as documented.     Kanchan Gonzales MD  2/25/2020

## 2020-03-10 ENCOUNTER — TELEPHONE (OUTPATIENT)
Dept: PAIN MANAGEMENT | Age: 80
End: 2020-03-10

## 2020-04-23 ENCOUNTER — TELEPHONE (OUTPATIENT)
Dept: PAIN MANAGEMENT | Age: 80
End: 2020-04-23

## 2020-05-01 ENCOUNTER — PREP FOR PROCEDURE (OUTPATIENT)
Dept: PAIN MANAGEMENT | Age: 80
End: 2020-05-01

## 2020-05-01 ENCOUNTER — VIRTUAL VISIT (OUTPATIENT)
Dept: PAIN MANAGEMENT | Age: 80
End: 2020-05-01
Payer: MEDICARE

## 2020-05-01 PROBLEM — M54.16 LUMBAR RADICULOPATHY: Status: ACTIVE | Noted: 2020-05-01

## 2020-05-01 PROCEDURE — 99442 PR PHYS/QHP TELEPHONE EVALUATION 11-20 MIN: CPT | Performed by: ANESTHESIOLOGY

## 2020-05-01 RX ORDER — COVID-19 ANTIGEN TEST
1 KIT MISCELLANEOUS PRN
COMMUNITY

## 2020-05-01 NOTE — PROGRESS NOTES
[address]  [facility], [address] [place] [address] Phani  [phone]  Telephone follow up visit      Date of Visit:  5/1/2020    CC:     Consent:  Telephone follow up due to Andrea 19 pandemic   The patient and/or health care decision maker is aware that he/she may receive a bill for this telephone service, depending on his insurance coverage, and has provided verbal consent to proceed: Yes    I have considered the risks of abuse, dependence, addiction and diversion. My patient is aware that they will need a follow-up visit (in-person or virtually) at the appropriate time indicated for continued medications. Further, my patient is aware that when this acute crisis has lifted, they will be expected to return for an in-person visit and all elements of standard local and hospital guidelines in order to continue this medication. Patient location: Home   Physician Location:Home     HPI:  H/o chronic Low back and LE pain.     S/P  Right L5 and Left L4 Transforaminal HÉCTOR on 9/3/2019 which is provided excellent pain relief. She had undergone lumbar TFESI : Last one done in 12/10/2019 with > 80% relief  Of pain and functionality-Right L5 and Left L4 Transforaminal epidural.    Pain history and details form the previous notes reviewed. Has done PT and continues to do HEP.     Takes Gabapentin for pain.     She has not been on anticoagulation medications.     She has not been on herbal supplements.       She is not diabetic.       Imaging studies:  Xray LS Flex/ Ext: 7/2019:      Findings: There is normal lumbar lordosis. There is grade 1 anterolisthesis of   L4 on L5 which slightly reduces with extension. There is multilevel   intervertebral disc space narrowing. There is lower lumbar facet   arthrosis.            Impression   Grade 1 anterolisthesis of L4 on L5 which slightly reduces with   extension.      MRI fo LS spine 5/2019:  FINDINGS:        BONE MARROW SIGNAL: There is

## 2020-05-13 ENCOUNTER — TELEPHONE (OUTPATIENT)
Dept: PAIN MANAGEMENT | Age: 80
End: 2020-05-13

## 2020-05-20 ENCOUNTER — HOSPITAL ENCOUNTER (OUTPATIENT)
Age: 80
Discharge: HOME | End: 2020-05-22
Payer: MEDICARE

## 2020-05-20 PROCEDURE — U0003 INFECTIOUS AGENT DETECTION BY NUCLEIC ACID (DNA OR RNA); SEVERE ACUTE RESPIRATORY SYNDROME CORONAVIRUS 2 (SARS-COV-2) (CORONAVIRUS DISEASE [COVID-19]), AMPLIFIED PROBE TECHNIQUE, MAKING USE OF HIGH THROUGHPUT TECHNOLOGIES AS DESCRIBED BY CMS-2020-01-R: HCPCS

## 2020-05-21 LAB
SARS-COV-2: NOT DETECTED
SOURCE: NORMAL

## 2020-05-22 RX ORDER — GABAPENTIN 300 MG/1
300 CAPSULE ORAL 2 TIMES DAILY
Qty: 60 CAPSULE | Refills: 5 | Status: SHIPPED
Start: 2020-05-22 | End: 2020-06-03

## 2020-05-26 ENCOUNTER — HOSPITAL ENCOUNTER (OUTPATIENT)
Dept: OPERATING ROOM | Age: 80
Setting detail: OUTPATIENT SURGERY
Discharge: HOME | End: 2020-05-26
Attending: ANESTHESIOLOGY
Payer: MEDICARE

## 2020-05-26 ENCOUNTER — HOSPITAL ENCOUNTER (OUTPATIENT)
Age: 80
Setting detail: OUTPATIENT SURGERY
Discharge: HOME | End: 2020-05-26
Attending: ANESTHESIOLOGY | Admitting: ANESTHESIOLOGY
Payer: MEDICARE

## 2020-05-26 VITALS
RESPIRATION RATE: 14 BRPM | OXYGEN SATURATION: 98 % | HEART RATE: 82 BPM | DIASTOLIC BLOOD PRESSURE: 77 MMHG | TEMPERATURE: 98.8 F | SYSTOLIC BLOOD PRESSURE: 132 MMHG

## 2020-05-26 PROCEDURE — 3600000005 HC SURGERY LEVEL 5 BASE: Performed by: ANESTHESIOLOGY

## 2020-05-26 PROCEDURE — 64483 NJX AA&/STRD TFRM EPI L/S 1: CPT | Performed by: ANESTHESIOLOGY

## 2020-05-26 PROCEDURE — 6360000002 HC RX W HCPCS: Performed by: ANESTHESIOLOGY

## 2020-05-26 PROCEDURE — 2500000003 HC RX 250 WO HCPCS: Performed by: ANESTHESIOLOGY

## 2020-05-26 PROCEDURE — 2709999900 HC NON-CHARGEABLE SUPPLY: Performed by: ANESTHESIOLOGY

## 2020-05-26 PROCEDURE — 3600000015 HC SURGERY LEVEL 5 ADDTL 15MIN: Performed by: ANESTHESIOLOGY

## 2020-05-26 PROCEDURE — 3209999900 FLUORO FOR SURGICAL PROCEDURES

## 2020-05-26 PROCEDURE — 64484 NJX AA&/STRD TFRM EPI L/S EA: CPT | Performed by: ANESTHESIOLOGY

## 2020-05-26 PROCEDURE — 6360000004 HC RX CONTRAST MEDICATION: Performed by: ANESTHESIOLOGY

## 2020-05-26 PROCEDURE — 7100000010 HC PHASE II RECOVERY - FIRST 15 MIN: Performed by: ANESTHESIOLOGY

## 2020-05-26 RX ORDER — LIDOCAINE HYDROCHLORIDE 5 MG/ML
INJECTION, SOLUTION INFILTRATION; INTRAVENOUS PRN
Status: DISCONTINUED | OUTPATIENT
Start: 2020-05-26 | End: 2020-05-26 | Stop reason: ALTCHOICE

## 2020-05-26 RX ORDER — DEXAMETHASONE SODIUM PHOSPHATE 10 MG/ML
INJECTION, SOLUTION INTRAMUSCULAR; INTRAVENOUS PRN
Status: DISCONTINUED | OUTPATIENT
Start: 2020-05-26 | End: 2020-05-26 | Stop reason: ALTCHOICE

## 2020-05-26 ASSESSMENT — PAIN SCALES - GENERAL
PAINLEVEL_OUTOF10: 0
PAINLEVEL_OUTOF10: 0

## 2020-05-26 ASSESSMENT — PAIN DESCRIPTION - DESCRIPTORS: DESCRIPTORS: DISCOMFORT;CONSTANT

## 2020-05-26 ASSESSMENT — PAIN - FUNCTIONAL ASSESSMENT: PAIN_FUNCTIONAL_ASSESSMENT: 0-10

## 2020-05-26 NOTE — OP NOTE
placed and vital signs were observed throughout the procedure. The area of the lumbar spine was prepped with chloraprep and draped in a sterile manner. The vertebral body was identified with AP fluoroscopy. An oblique view was obtained to better visualize the inferior junction of the pedicle and transverse process . The 6 o'clock position of the pedicle was marked and identified. The skin and subcutaneous tissue were anesthetized with 0.5% lidocaine. Two # 22 gauge pencil point needle was directed toward the targeted point under fluoroscopy - one on each side until bone was contacted. The needle was then walked inferiorly until the neural foramen was entered . A lateral fluoroscopic view was then used to place the needle tip in the middle of the foramen. Negative aspiration was confirmed for blood and CSF and 0.5 cc of Omnipaque 300 contrast was injected at each level under live fluoroscopy. Appropriate neurograms were observed under AP fluoroscopy. Then after negative aspiration, a solution of the 3 cc of 0.5% lidocaine and 8 mg Dexamehasone was easily injected at each level. The needles were removed with constant aspiration technique. The patient back was cleaned and a bandage was placed over the needle insertion points    Disposition the patient tolerated the procedure well and there were no complications . Vital signs remained stable throughout the procedure. The patient was escorted to the recovery area where they remained until discharge and written discharge instructions for the procedure were given. Plan: Harpal Montoya will return to our pain management center as scheduled.      Nolan Mccloud MD

## 2020-06-02 ENCOUNTER — TELEPHONE (OUTPATIENT)
Dept: FAMILY MEDICINE CLINIC | Age: 80
End: 2020-06-02

## 2020-06-03 RX ORDER — GABAPENTIN 600 MG/1
600 TABLET ORAL 2 TIMES DAILY
Qty: 60 TABLET | Refills: 5 | Status: SHIPPED
Start: 2020-06-03 | End: 2020-09-04 | Stop reason: SDUPTHER

## 2020-06-09 ENCOUNTER — TELEPHONE (OUTPATIENT)
Dept: FAMILY MEDICINE CLINIC | Age: 80
End: 2020-06-09

## 2020-06-09 NOTE — TELEPHONE ENCOUNTER
Patient daughter, Ana Mahajan phoned stating that there still was problem with patient gabapentin that patient is to be taking 2 300mg tab, two times a day and instructions on new prescription say 1 tablet 2 times a days. Informed patient daughter, that the medication was changed to 600mg gabpentin tablets and not 300mg tablets so therefore, patient is to be only taking 1 tablet by mouth 2 times daily. Patient daughter voiced understanding of this an will make sure mother is only taking 1 tablet 2 times a day.

## 2020-06-30 ENCOUNTER — OFFICE VISIT (OUTPATIENT)
Dept: PAIN MANAGEMENT | Age: 80
End: 2020-06-30
Payer: MEDICARE

## 2020-06-30 VITALS
DIASTOLIC BLOOD PRESSURE: 62 MMHG | BODY MASS INDEX: 23.04 KG/M2 | WEIGHT: 130 LBS | TEMPERATURE: 97.9 F | SYSTOLIC BLOOD PRESSURE: 124 MMHG | HEART RATE: 85 BPM | RESPIRATION RATE: 16 BRPM | HEIGHT: 63 IN | OXYGEN SATURATION: 95 %

## 2020-06-30 PROCEDURE — G8420 CALC BMI NORM PARAMETERS: HCPCS | Performed by: ANESTHESIOLOGY

## 2020-06-30 PROCEDURE — 4040F PNEUMOC VAC/ADMIN/RCVD: CPT | Performed by: ANESTHESIOLOGY

## 2020-06-30 PROCEDURE — 1123F ACP DISCUSS/DSCN MKR DOCD: CPT | Performed by: ANESTHESIOLOGY

## 2020-06-30 PROCEDURE — 99213 OFFICE O/P EST LOW 20 MIN: CPT | Performed by: ANESTHESIOLOGY

## 2020-06-30 PROCEDURE — G8400 PT W/DXA NO RESULTS DOC: HCPCS | Performed by: ANESTHESIOLOGY

## 2020-06-30 PROCEDURE — G8427 DOCREV CUR MEDS BY ELIG CLIN: HCPCS | Performed by: ANESTHESIOLOGY

## 2020-06-30 PROCEDURE — 1090F PRES/ABSN URINE INCON ASSESS: CPT | Performed by: ANESTHESIOLOGY

## 2020-06-30 PROCEDURE — 4004F PT TOBACCO SCREEN RCVD TLK: CPT | Performed by: ANESTHESIOLOGY

## 2020-06-30 NOTE — PROGRESS NOTES
Do you currently have any of the following:    Fever: No  Headache:  No  Cough: No  Shortness of breath: No  Exposed to anyone with these symptoms: No                                                                                                                Xavier Alejandro presents to the [facility] on 6/30/2020. Terence Rivas is complaining of pain in lower back and her balance she states is worse than it was before. . The pain is constant. The pain is described as feet feel funny. . Pain is rated on her best day at a 1, on her worst day at a 3, and on average at a 2 on the VAS scale. She took her last dose of Neurontin this Bridget Doll does not have issues with constipation. Any procedures since your last visit: Yes, with 0 % relief. She is  on NSAIDS and  is not on anticoagulation medications to include none and is managed by NA. Pacemaker or defibrilator: No Physician managing device is NA.       /62   Pulse 85   Temp 97.9 °F (36.6 °C) (Oral)   Resp 16   Ht 5' 3\" (1.6 m)   Wt 130 lb (59 kg)   SpO2 95%   BMI 23.03 kg/m²      No LMP recorded. Patient has had a hysterectomy.

## 2020-06-30 NOTE — PROGRESS NOTES
NERVE BLOCK Right 12/10/2019    right L5 and left L4 transforaminal     NERVE BLOCK Right 05/26/2020    PAIN MANAGEMENT PROCEDURE Bilateral 5/26/2020    LUMBAR TRANSFORAMINAL EPIDURAL STEROID INJECTION RIGHT L5 AND LEFT L4 UNDER FLUOROSCOPIC GUIDANCE performed by Filomena Vitale MD at [address] [place] [address]       Prior to Admission medications    Medication Sig Start Date End Date Taking? Authorizing Provider   gabapentin (NEURONTIN) 600 MG tablet Take 1 tablet by mouth 2 times daily. 6/3/20 6/3/21 Yes Diantnona Garland, DO   Naproxen Sodium (ALEVE) 220 MG CAPS Take 1 capsule by mouth as needed for Pain   Yes Historical Provider, MD   diclofenac sodium 1 % GEL Apply 2 g topically 4 times daily  Patient not taking: Reported on 6/30/2020 1/28/20   Yeyo aWlls MD       Allergies   Allergen Reactions    Hydrocodone-Acetaminophen Itching and Nausea Only       Social History     Socioeconomic History    Marital status:       Spouse name: Not on file    Number of children: Not on file    Years of education: Not on file    Highest education level: Not on file   Occupational History    Not on file   Social Needs    Financial resource strain: Not on file    Food insecurity     Worry: Not on file     Inability: Not on file    Transportation needs     Medical: Not on file     Non-medical: Not on file   Tobacco Use    Smoking status: Current Every Day Smoker     Packs/day: 1.00     Years: 60.00     Pack years: 60.00     Types: Cigarettes     Start date: 7/13/1955    Smokeless tobacco: Never Used   Substance and Sexual Activity    Alcohol use: No    Drug use: No    Sexual activity: Not on file   Lifestyle    Physical activity     Days per week: Not on file     Minutes per session: Not on file    Stress: Not on file   Relationships    Social connections     Talks on phone: Not on file     Gets together: Not on file     Attends [employer] service: Not on file     Active member of club or organization: Not test:negative bilaterally   Piriformis tenderness: negative bilaterally  SLR : negative bilaterally  Trochanteric bursa tenderness: negative bilaterally  CVA tenderness:No       Musculoskeletal:     Trigger points in trapezius: No bilaterally  Trigger points in rhomboids: No bilaterally  Trigger points in Paravertebral: No bilaterally        Extremities:     No edema     Right lower extremity splint noted     Knee:     ROM : no pain  Joint line tenderness : no     Neurological:     Sensory: Normal to light touch      Motor:   Right  5/5              Left  5/5               Right Bicep 5/5           Left Bicep 5/5              Right Triceps 5/5       Left Triceps 5/5          Right Deltoid 5/5     Left Deltoid 5/5                  Right Quadriceps 5/5          Left Quadriceps 5/5           Right Gastrocnemius 5/5    Left Gastrocnemius 5/5  Right Ant Tibialis 5/5  Left Ant Tibialis 5/5     Reflexes:    Bilaterally equal     Gait:normal Yes     Dermatology:     Skin:no rashes or lesions noted      Assessment/Plan:   Diagnosis Orders   1. Spinal stenosis of lumbar region, unspecified whether neurogenic claudication present  [facility]   2. DDD (degenerative disc disease), lumbar  [facility]   3. Lumbosacral spondylosis without myelopathy  [facility]        H/o chronic Low back and B/l LE pain.     S/p LESI with good pain relief of the lower extremities.     S/P Lumbar Transforaminal steroid injection most recntl yon 5/26/2020 had significantly helped her pain. She has noted significant improvement in pain and functionality.     Continue home exercise program- as tolerated. She has been having issues with balance. Recommend trial of reducing the dose of Gabapentin. Set up for Physical therapy.     Recommend eval by Primary team or neurology if balance issue worsens.                 She is followed by

## 2020-07-09 ENCOUNTER — EVALUATION (OUTPATIENT)
Dept: PHYSICAL THERAPY | Age: 80
End: 2020-07-09
Payer: MEDICARE

## 2020-07-09 PROCEDURE — 97162 PT EVAL MOD COMPLEX 30 MIN: CPT | Performed by: PHYSICAL THERAPIST

## 2020-07-09 NOTE — PROGRESS NOTES
[facility] OUTPATIENT REHABILITATION  PHYSICAL THERAPY INITIAL EVALUATION         Date:  2020   Patient: Julio Ram  : 1940  MRN: [id number]  Referring Provider: Roas Raymundo MD  [facility]     Medical Diagnosis:      Diagnosis Orders   1. Spinal stenosis of lumbar region, unspecified whether neurogenic claudication present     2. DDD (degenerative disc disease), lumbar     3. Lumbosacral spondylosis without myelopathy          SUBJECTIVE:     History: Patient reports decreased functional mobility over the past ***  {gen duration:537455} due to ***. Previous PT: {previouspt:40266}    Related impairments: {Impairments:54865}    Chief complaint: {chiefcomplaint:91399}    Behavior: condition is {condition:18575}    Pain: {pain:55196}  Current: {pain scale:022376972}/10         Symptom Type/Quality: {PAIN QUALITY:15894}  Location[de-identified] ***    Imaging results: No results found.     Past Medical History:  Past Medical History:   Diagnosis Date    Carpal tunnel syndrome, bilateral     L hand OR 19     Elevated hemoglobin ([facility] [address] 2018     Past Surgical History:   Procedure Laterality Date    ANESTHESIA NERVE BLOCK Bilateral 12/10/2019    RIGHT L5 AND LEFT L4 TRANSFORAMINAL LUMBAR EPIDURAL UNDER FLUOROSCOPY (CPT 59371) performed by Rosa Raymundo MD at [address] Right 2019    RIGHT ENDOSCOPIC CARPAL TUNNEL RELEASE performed by Gena Ruiz MD at [facility] Left 2019    LEFT ENDOSCOPIC CARPAL TUNNEL RELEASE performed by Gena Ruiz MD at [address] [place] [address] Drive Right 1 22 13    CATARACT REMOVAL WITH IMPLANT Left 13    CHOLECYSTECTOMY      open    EPIDURAL STEROID INJECTION N/A 2019    LUMBAR EPIDURAL STEROID INJECTION L4-5 UNDER FLUOROSCOPIC GUIDANCE performed by Rosa Raymundo MD at [address] [place] [address] EPIDURAL STEROID INJECTION Bilateral 9/3/2019    ATTAMPTED BILATERAL LUMBAR TRANSFORAMINAL EPIDURAL STEROID INJECTION LEFT L4 AND RIGHT  L5 UNDER FLUOROSCOPIC GUIDANCE performed by Allan Valle MD at [address] N/A 2019    lumbar epidural L4-5    NERVE BLOCK Bilateral 2019    Left L4, Right L5    NERVE BLOCK Right 12/10/2019    right L5 and left L4 transforaminal     NERVE BLOCK Right 2020    PAIN MANAGEMENT PROCEDURE Bilateral 2020    LUMBAR TRANSFORAMINAL EPIDURAL STEROID INJECTION RIGHT L5 AND LEFT L4 UNDER FLUOROSCOPIC GUIDANCE performed by Allan Valle MD at [facility]       Medications:   Current Outpatient Medications   Medication Sig Dispense Refill    gabapentin (NEURONTIN) 600 MG tablet Take 1 tablet by mouth 2 times daily. 60 tablet 5    Naproxen Sodium (ALEVE) 220 MG CAPS Take 1 capsule by mouth as needed for Pain      diclofenac sodium 1 % GEL Apply 2 g topically 4 times daily (Patient not taking: Reported on 2020) 2 Tube 1     No current facility-administered medications for this visit. Social history: Patient lives {livingsituation:97331} in a {hometype:35438} with {STEPS:} to enter {railin}. Equipment owned: {equipment:15297}    Reported limitations: {ADL limitations:73666} {Mobility Screen:2000}    Occupation: {occupation:91277}. Physical demands include: {physicaldemands:53256}. Status: {workstatus:63099}    Exercise regimen: {exerciseregimen:44161}    Hobbies: {hobbies:60970}    Patient Goals: {patientgoals:22077}    Contraindications/Precautions: {precautions:13019}    OBJECTIVE:     Observations: {observations:37219}    Inspection: {skeletalinspection:51991}       Gait: {gait:37304}    Functional Strength: ***  Able to toe walk, heel walk, and squat.     Range of Motion:    Neck: ***   Flexion:  [] Normal   [] Limited ***   Extension:  [] Normal   [] Limited ***    Right Rotation: [] Normal   [] Limited ***   Left Rotation:  [] Normal   [] Limited ***   Right Side Bending: [] Normal   [] Limited ***   Left Side Bending: [] Normal   [] Limited ***    Upper Extremity:   Right:   [] Normal   [] Limited ***   Left:   [] Normal   [] Limited ***    Trunk:   Flexion:  [] Normal   [] Limited ***   Extension:  [] Normal   [] Limited ***    Right Rotation: [] Normal   [] Limited ***   Left Rotation:  [] Normal   [] Limited ***   Right Side Bending: [] Normal   [] Limited ***   Left Side Bending: [] Normal   [] Limited ***    Lower Extremity:   Right:   [] Normal   [] Limited ***   Left:   [] Normal   [] Limited ***      Strength:     Neck: {NUMBERS; EXAM STRENGTH:16230}   Trunk: {NUMBERS; EXAM STRENGTH:16982}   R UE: {NUMBERS; EXAM STRENGTH:51117}   L UE: {NUMBERS; EXAM STRENGTH:79844}   R LE: {NUMBERS; EXAM STRENGTH:41911}   L LE: {NUMBERS; EXAM STRENGTH:82281}    Functional Mobility/Tests:  Test    Basic Transfer ***   Sit/Stand ***   Squat ***   Double stance, feet together, eyes open ***   Double stance, feet together, eyes closed ***   Step stool touches ***   360 degree turns ***   Dysdiadochokinesia ***Not present    Dysmetria  ***Not present      TUG Test:  _***_  Seconds. Test date: ***  Notes: ***      An older adult who takes ? 12 seconds to complete the TUG is at high risk for falling. 30-Sec. Chair Stand Test:  Number:  _***_  Test date: ***    Notes: ***     Scoring criteria.       Chair Stand--Below Average Scores Age  Men  Women    60-64  < 14  < 12    65-69  < 12  < 11    70-74  < 12  < 10    75-79  < 11  < 10    80-84  < 10  < 9    85-89  < 8  < 8    90-94  < 7  < 4         ASSESSMENT     Outcome Measure:   ***    Problems:    Strength decreased   Balance limitations in ***   Decreased functional ability with {functionallimitations:85632}    [x] There are no barriers affecting plan of care or recovery    [] Barriers to this patient's plan of care or recovery include. Domestic Concerns:  [x] No  [] Yes:    Short Term goals ({stgduration:94377})   Increase Strength to ***    Demonstrate improved balance in ***   Able to perform/complete the following functions/tasks: ***    Long Term goals ({ltgduration:70265})   Increase Strength to ***    Demonstrate improved balance in ***   Able to perform/complete the following functions/tasks: ***   Independent with Home Exercise Programs     Rehab Potential: [x] Good  [] Fair  [] Poor    PLAN       Treatment Plan:  [x] Therapeutic Exercise  [x] Therapeutic Activity  [x] Neuromuscular Re-education   [x] Gait Training  [x] Balance Training  [] Aerobic conditioning  [] Manual Therapy  [] Massage/Fascial release   [] Work/Sport specific activities    [] Pain Neuroscience [] Cold/hotpack  [] Vasocompression  [] Electrical Stimulation  [] Lumbar/Cervical Traction  [] Ultrasound   [] Iontophoresis: 4 mg/mL Dexamethasone Sodium Phosphate 40-80 mAmin        [x] Instruction in HEP      []  Medication allergies reviewed for use of Dexamethasone Sodium Phosphate 4mg/ml  with iontophoresis treatments. Patient is not allergic. The following CPT codes are likely to be used in the care of this patient: {codes:76531}      Suggested Professional Referral: [x] No  [] Yes:     Patient Education:  [x] Plans/Goals, Risks/Benefits discussed  [x] Home exercise program  Method of Education: [x] Verbal  [x] Demo  [x] Written  Comprehension of Education:  [x] Verbalizes understanding. [x] Demonstrates understanding. [] Needs Review. [] Demonstrates/verbalizes understanding of HEP/Ed previously given. Frequency:  {daysperweek:58976} for {numberofweeks:19882}    Patient understands diagnosis/prognosis and consents to treatment, plan and goals: [x] Yes    [] No     Thank you for the opportunity to work with your patient. If you have questions or comments, please contact me at [phone]; fax: [phone].     Electronically signed by: Ramiro Moore, PT         Please sign Physician's Certification and return to: [address] [id number]  Dept: [phone]  Dept Fax: [phone] Certification / Comments     Frequency/Duration {daysperweek:66288} for {numberofweeks:98782}. Certification period from 7/9/2020  to ***. I have reviewed the Plan of Care established for skilled therapy services and certify that the services are required and that they will be provided while the patient is under my care.     Physician's Comments/Revisions:               Physician's Printed Name:                                           [de-identified] Signature:                                                               Date:

## 2020-07-09 NOTE — PROGRESS NOTES
[facility] OUTPATIENT REHABILITATION  PHYSICAL THERAPY INITIAL EVALUATION         Date:  2020   Patient: Dylan Torres  : 1940  MRN: [id number]  Referring Provider: Reinaldo Lombard, MD  [address] Diagnosis:      Diagnosis Orders   1. Spinal stenosis of lumbar region, unspecified whether neurogenic claudication present     2. DDD (degenerative disc disease), lumbar     3. Lumbosacral spondylosis without myelopathy         SUBJECTIVE:     Onset date: ~1 year    Onset: Insidious onset    History / Mechanism of Injury: Patient reports declining function over the last year. She states the problem was pain-related, but she is better since epidurals. Now, she denies pain and does not characterize her problem as a pain problem. She states her problem now is weakness and altered gait. She feels her legs are weak. She states she has to pause upon getting up, stand up straight, and then she can begin ambulating. Ambulation is characterized as either cane (community ambulation) or reaching out for walls and furniture (at home). She denies falls.        Medical Management for Current Problem:   S/P  Right L5 and Left L4 Transforaminal HÉCTOR on 9/3/2019 which is provided excellent pain relief.     S/P lumbar TFESI on 12/10/2019 with > 80% relief  Of pain and functionality-Right L5 and Left L4 Transforaminal epidural.     S/P Lumbar TFESI on 2020: excellent pain relief > 80%    Chief complaint: decreased mobility, weakness and altered gait    Pain:   Current: 0/10          Imaging results:   Narrative Lumbar MRI 19   LOCATION: 200       EXAM: MRI LUMBAR SPINE WO CONTRAST       COMPARISON: None       HISTORY: Low back pain with radiculopathy        TECHNIQUE: Multiplanar T1 and T2-weighted MRI images of the lumbar   spine were performed.       CONTRAST: No contrast was administered.       FINDINGS:        BONE MARROW SIGNAL: There is normal marrow signal.       T12-L1: Normal T12-L1       L1-L2: Noncompressive disc bulge without central or foraminal   stenosis.       L2-L3: Moderate diffuse bulge and facet hypertrophy with mild canal   and foraminal stenosis.       L3-L4: Noncompressive disc bulge and facet hypertrophy with moderate   foraminal stenosis.       L4-L5: Grade 1 anterior listhesis noted with a moderate pseudobulge. Prominent facet hypertrophy and ligamentum flavum infolding noted with   moderate canal and lateral recess stenosis. Severe foraminal stenosis   noted.       L5-S1: Prominent facet hypertrophy noted with mild foraminal stenosis. No canal stenosis.       SOFT TISSUES: The visualized paravertebral soft tissues are within   normal limits.           Impression   1. Grade 1 spondylolisthesis of L4 with moderate canal and lateral   recess stenosis. 2. Multilevel disc bulges as discussed. 3. No disc herniations identified. Narrative lumbar x-ray 4/16/19   Reading location: [facility]           INDICATION: Lower extremity paresthesia       FINDINGS: AP and lateral views of the lumbar spine.       Atherosclerosis.        Mild S-shaped thoracolumbar scoliosis. Vertebral height maintained. About 4.5 mm anterolisthesis L4-5. Mild multilevel disc space   narrowing with marginal spurring. Facet joint narrowing with   subchondral sclerosis and spurring most severe at L4-5 and L5-S1.           Impression   1. Scoliosis. 2. Spondylosis. 3.  L4-5 spondylolisthesis       Past Medical History:  Past Medical History:   Diagnosis Date    Carpal tunnel syndrome, bilateral     L hand OR 7-2-19     Elevated hemoglobin ([facility] [address] 8/6/2018     Past Surgical History:   Procedure Laterality Date    ANESTHESIA NERVE BLOCK Bilateral 12/10/2019    RIGHT L5 AND LEFT L4 TRANSFORAMINAL LUMBAR EPIDURAL UNDER FLUOROSCOPY (CPT 49297) performed by Chela Loya MD at [place] [address]. Right 6/14/2019    RIGHT ENDOSCOPIC CARPAL TUNNEL RELEASE performed by Deepak Haile MD at [address] Left 7/2/2019    LEFT ENDOSCOPIC CARPAL TUNNEL RELEASE performed by Deepak Haile MD at [address] Right 1 22 13    CATARACT REMOVAL WITH IMPLANT Left 07/02/13    CHOLECYSTECTOMY  1980's    open    EPIDURAL STEROID INJECTION N/A 7/9/2019    LUMBAR EPIDURAL STEROID INJECTION L4-5 UNDER FLUOROSCOPIC GUIDANCE performed by Ward Wills MD at [place] [address] Bilateral 9/3/2019    ATTAMPTED BILATERAL LUMBAR TRANSFORAMINAL EPIDURAL STEROID INJECTION LEFT L4 AND RIGHT  L5 UNDER FLUOROSCOPIC GUIDANCE performed by Ward Wills MD at [facility] N/A 07/09/2019    lumbar epidural L4-5    NERVE BLOCK Bilateral 09/03/2019    Left L4, Right L5    NERVE BLOCK Right 12/10/2019    right L5 and left L4 transforaminal     NERVE BLOCK Right 05/26/2020    PAIN MANAGEMENT PROCEDURE Bilateral 5/26/2020    LUMBAR TRANSFORAMINAL EPIDURAL STEROID INJECTION RIGHT L5 AND LEFT L4 UNDER FLUOROSCOPIC GUIDANCE performed by Ward Wills MD at [address] [place] [address]       Medications:   Current Outpatient Medications   Medication Sig Dispense Refill    gabapentin (NEURONTIN) 600 MG tablet Take 1 tablet by mouth 2 times daily. 60 tablet 5    Naproxen Sodium (ALEVE) 220 MG CAPS Take 1 capsule by mouth as needed for Pain      diclofenac sodium 1 % GEL Apply 2 g topically 4 times daily (Patient not taking: Reported on 6/30/2020) 2 Tube 1     No current facility-administered medications for this visit.         Patient Goals: walk better, have better balance     Contraindications/Precautions: altered gait; did not bring cane to PT    OBJECTIVE:     Inspection: normal orthopedic exam         Gait: altered with short step length, width, height, holds on to PT     Functional Strength:   Able to toe walk, heel walk, and squat. Range of Motion:    Trunk:    Flexion:  [x] Normal   [] Limited    Extension:  [x] Normal   [] Limited     Right Rotation: [x] Normal   [] Limited    Left Rotation:  [x] Normal   [] Limited    Right Side Bending: [x] Normal   [] Limited    Left Side Bending: [x] Normal   [] Limited     Lower Extremity:   Right:   [x] Normal   [] Limited    Left:   [x] Normal   [] Limited       Strength:     Trunk: 3/5   R LE: Hip and knee 4/5, foot/ankle 5/5   L LE: Hip and knee 4/5, foot/ankle 5/5    Palpation: Non-tender to palpation      Sensation: intact to light touch and temperature. Functional Mobility/Tests:  Test    Basic Transfer Guarded    Sit/Stand See test    Squat Noted above    Double stance, feet together, eyes open Poor stability; increased sway   Double stance, feet together, eyes closed Unable    Step stool touches Unable    360 degree turns Poor stability      TUG Test:  _24_  Seconds. Test date: 7/9/20  Notes: Slow rise from chair. Pause when upright. Slow gait pattern with short steps in distance and height. An older adult who takes ? 12 seconds to complete the TUG is at high risk for falling. 30-Sec. Chair Stand Test:  Number:  _8_  Test date: 7/9/20    Notes: Good control. Scoring criteria. Chair Stand--Below Average Scores Age  Men  Women    60-64  < 14  < 12    65-69  < 12  < 11    70-74  < 12  < 10    75-79  < 11  < 10    80-84  < 10  < 9    85-89  < 8  < 8    90-94  < 7  < 4         ASSESSMENT        Problems:    Altered gait   Decreased balance    Strength decreased   Decreased functional ability with walking, stairs, ADLs, use of right lower extremity, use of left lower extremity    [x] There are no barriers affecting plan of care or recovery    [] Barriers to this patient's plan of care or recovery include.     Domestic Concerns:  [x] No  [] Yes:    Short Term goals (2-3 weeks)   Able to perform 10 sit/stands   Complete TUG in <18 seconds  Increase Strength to 4/5    Able to perform/complete the following functions/tasks: ADLs    Long Term goals (4-6 weeks)   Able to perform 12 sit/stands    Complete TUG in <13 seconds   Increase Strength to 5/5    Able to perform/complete the following functions/tasks: IADLs, ambulate safely without cane; if unable to wean cane - demonstrate consistent use of cane      Independent with Home Exercise Programs    Rehab Potential: [x] Good  [] Fair  [] Poor    PLAN       Treatment Plan:   [x] Therapeutic Exercise  [x] Therapeutic Activity  [x] Neuromuscular Re-education   [x] Gait Training  [x] Balance Training  [] Aerobic conditioning  [] Manual Therapy  [] Massage/Fascial release   [] Work/Sport specific activities    [] Pain Neuroscience [] Cold/hotpack  [] Vasocompression  [x] Electrical Stimulation  [] Lumbar/Cervical Traction  [] Ultrasound   [] Iontophoresis: 4 mg/mL Dexamethasone Sodium Phosphate 40-80 mAmin        [x] Instruction in HEP      []  Medication allergies reviewed for use of Dexamethasone Sodium Phosphate 4mg/ml  with iontophoresis treatments. Patient is not allergic. The following CPT codes are likely to be used in the care of this patient: 50764 PT Evaluation: Moderate Complexity       Suggested Professional Referral: [x] No  [] Yes:     Patient Education:  [x] Plans/Goals, Risks/Benefits discussed  [x] Home exercise program  Method of Education: [x] Verbal  [x] Demo  [x] Written  Comprehension of Education:  [x] Verbalizes understanding. [x] Demonstrates understanding. [] Needs Review. [] Demonstrates/verbalizes understanding of HEP/Ed previously given. Frequency:  1-2 days per week for 4-6 weeks    Patient understands diagnosis/prognosis and consents to treatment, plan and goals: [x] Yes    [] No     Thank you for the opportunity to work with your patient. If you have questions or comments, please contact me at [phone]; fax: [phone].     Electronically signed by: Zackery Molina, PT         Please sign Physician's Certification and return to: [address] [place] [address]  Dept: [phone]  Dept Fax: [phone] Certification / Comments     Frequency/Duration 1-2 days per week for 4-6 weeks. Certification period from 7/9/2020  to 10/09/2020. I have reviewed the Plan of Care established for skilled therapy services and certify that the services are required and that they will be provided while the patient is under my care.     Physician's Comments/Revisions:               Physician's Printed Name:                                           [de-identified] Signature:                                                               Date:

## 2020-07-14 ENCOUNTER — TREATMENT (OUTPATIENT)
Dept: PHYSICAL THERAPY | Age: 80
End: 2020-07-14
Payer: MEDICARE

## 2020-07-14 PROCEDURE — 97530 THERAPEUTIC ACTIVITIES: CPT

## 2020-07-14 NOTE — PROGRESS NOTES
Physical Therapy Daily Treatment Note    Date: 2020  Patient Name: Anna Estes  : 1940   MRN: [id number]  DOInjury: 1 year  DOSx: --  Referring Provider:  Rasheed Randhawa MD  [address] Diagnosis:      Diagnosis Orders   1. Spinal stenosis of lumbar region, unspecified whether neurogenic claudication present       TUG Test:  _24_  Seconds.  Test date: 20  Notes: Slow rise from chair. Pause when upright. Slow gait pattern with short steps in distance and height.     30-Sec. Chair Stand Test:  Number:  _8_  Test date: 20    Notes: Good control. S: Pt reports tightness in LB, numbness in both feet and balance issues  O:   Time 1455-7209     Visit  Repeat outcome measure at mid point and end. Pain 0/10     Exercise         TE         Squats   TA   Calf Raises   TA   Marches   TA   Alt. Sidekicks 2 x 10   TA         Sit/Stands 3 x 5  TA         Gait training 2 x 40 ft with cane  GT         Marching Gait 2 x 2 laps in II bars  NR   Sidestepping   NR                                 A:  Tolerated well. Large, functional, dynamic, global movements used to build strength, balance, endurance, and flexibility and to improve physical performance.   P: Continue with rehab plan  Mauricio Valladares PTA     Treatment Charges: Mins Units   Initial Evaluation     Re-Evaluation     Ther Exercise         TE     Manual Therapy     MT     Ther Activities        TA 35 2   Gait Training          GT     Neuro Re-education NR     Modalities     Non-Billable Service Time     Other     Total Time/Units 35 2

## 2020-07-16 ENCOUNTER — TREATMENT (OUTPATIENT)
Dept: PHYSICAL THERAPY | Age: 80
End: 2020-07-16
Payer: MEDICARE

## 2020-07-16 PROCEDURE — 97530 THERAPEUTIC ACTIVITIES: CPT

## 2020-07-16 PROCEDURE — 97112 NEUROMUSCULAR REEDUCATION: CPT

## 2020-07-16 NOTE — PROGRESS NOTES
Physical Therapy Daily Treatment Note    Date: 2020  Patient Name: Lv Culp  : 1940   MRN: [id number]  DOInjury: 1 year  DOSx: --  Referring Provider:  Santos Robert MD  [address] Diagnosis:      Diagnosis Orders   1. Spinal stenosis of lumbar region, unspecified whether neurogenic claudication present       TUG Test:  _24_  Seconds.  Test date: 20  Notes: Slow rise from chair. Pause when upright. Slow gait pattern with short steps in distance and height.     30-Sec. Chair Stand Test:  Number:  _8_  Test date: 20    Notes: Good control. S: Pt reports tightness in LB, numbness in both feet and balance issues  O:   Time      Visit 3/12 Repeat outcome measure at mid point and end. Pain 0/10     Exercise         TE         Squats   TA   Calf Raises 2 x 10  TA   Marches   TA   Alt. Sidekicks   TA         Sit/Stands 2 x 10  TA         Gait training 2 x 40 ft with cane  GT         Marching Gait 4 laps in II bars  NR   Sidestepping 4 lap in II bars  NR         Cone taps 2 x 3 ea foot in II bars  NR                     A:  Tolerated well. Fatigues quickly. Large, functional, dynamic, global movements used to build strength, balance, endurance, and flexibility and to improve physical performance. Feedback and cues necessary for developing neuromuscular control. Movement education and guided movement interventions such as balance used to improve performance and control.   P: Continue with rehab plan  Serina Us PTA     Treatment Charges: Mins Units   Initial Evaluation     Re-Evaluation     Ther Exercise         TE     Manual Therapy     MT     Ther Activities        TA 20 1   Gait Training          GT     Neuro Re-education NR 20 1   Modalities     Non-Billable Service Time     Other     Total Time/Units 40 1

## 2020-07-21 ENCOUNTER — TELEPHONE (OUTPATIENT)
Dept: PAIN MANAGEMENT | Age: 80
End: 2020-07-21

## 2020-07-21 ENCOUNTER — TELEPHONE (OUTPATIENT)
Dept: PHYSICAL THERAPY | Age: 80
End: 2020-07-21

## 2020-08-04 ENCOUNTER — TELEPHONE (OUTPATIENT)
Dept: PAIN MANAGEMENT | Age: 80
End: 2020-08-04

## 2020-08-04 NOTE — TELEPHONE ENCOUNTER
Daughter is calling in asking for neurology consult/referral.  She states this was discussed at last visit.

## 2020-08-10 ENCOUNTER — TELEPHONE (OUTPATIENT)
Dept: PAIN MANAGEMENT | Age: 80
End: 2020-08-10

## 2020-08-10 NOTE — TELEPHONE ENCOUNTER
Erin's daughter called inquiring about the referral to neurology. I did give the phone number so she could follow up.

## 2020-09-04 ENCOUNTER — OFFICE VISIT (OUTPATIENT)
Dept: NEUROLOGY | Age: 80
End: 2020-09-04
Payer: MEDICARE

## 2020-09-04 VITALS
HEIGHT: 63 IN | DIASTOLIC BLOOD PRESSURE: 75 MMHG | WEIGHT: 140 LBS | OXYGEN SATURATION: 96 % | TEMPERATURE: 98.1 F | BODY MASS INDEX: 24.8 KG/M2 | SYSTOLIC BLOOD PRESSURE: 123 MMHG | HEART RATE: 79 BPM

## 2020-09-04 PROCEDURE — 1123F ACP DISCUSS/DSCN MKR DOCD: CPT | Performed by: NURSE PRACTITIONER

## 2020-09-04 PROCEDURE — 1090F PRES/ABSN URINE INCON ASSESS: CPT | Performed by: NURSE PRACTITIONER

## 2020-09-04 PROCEDURE — 99205 OFFICE O/P NEW HI 60 MIN: CPT | Performed by: NURSE PRACTITIONER

## 2020-09-04 PROCEDURE — 4040F PNEUMOC VAC/ADMIN/RCVD: CPT | Performed by: NURSE PRACTITIONER

## 2020-09-04 PROCEDURE — G8427 DOCREV CUR MEDS BY ELIG CLIN: HCPCS | Performed by: NURSE PRACTITIONER

## 2020-09-04 PROCEDURE — G8420 CALC BMI NORM PARAMETERS: HCPCS | Performed by: NURSE PRACTITIONER

## 2020-09-04 PROCEDURE — 4004F PT TOBACCO SCREEN RCVD TLK: CPT | Performed by: NURSE PRACTITIONER

## 2020-09-04 PROCEDURE — G8400 PT W/DXA NO RESULTS DOC: HCPCS | Performed by: NURSE PRACTITIONER

## 2020-09-04 RX ORDER — FOLIC ACID 1 MG/1
2 TABLET ORAL DAILY
Qty: 60 TABLET | Refills: 0 | Status: SHIPPED | OUTPATIENT
Start: 2020-09-04 | End: 2020-10-04

## 2020-09-04 RX ORDER — GABAPENTIN 600 MG/1
600 TABLET ORAL 3 TIMES DAILY
Qty: 90 TABLET | Refills: 11 | Status: SHIPPED
Start: 2020-09-04 | End: 2021-09-07 | Stop reason: SDUPTHER

## 2020-09-04 NOTE — PROGRESS NOTES
[address] [facility]. Danita Serna M.D., F.A.C.P. Serge Valle, DNP, APRN, CNS  Marilin Au. Bennett Richardson, MSN, APRN-FNP-C  Anali Dillon MSN, APRN, FNP-C  Homer CLARK PA-C  [address] MSN, APRN, FNP-C  [address], [address]  [place], [address]  Phone: [phone]  Fax: [phone]       Sreedhar Marrero is a [de-identified] y.o. right handed female     Patient presents today for evaluation management of numbness and tingling to bilateral lower extremities. Patient presents with her daughter--- both were deemed good historians.     Past Medical History:     Past Medical History:   Diagnosis Date    Carpal tunnel syndrome, bilateral     L hand OR 7-2-19     DDD (degenerative disc disease), cervical     Elevated hemoglobin ([facility] [address] 8/6/2018    Lumbar radiculopathy     Osteoarthritis        Past Surgical History:       Past Surgical History:   Procedure Laterality Date    ANESTHESIA NERVE BLOCK Bilateral 12/10/2019    RIGHT L5 AND LEFT L4 TRANSFORAMINAL LUMBAR EPIDURAL UNDER FLUOROSCOPY (CPT 76933) performed by Krystin Cummings MD at [address] Right 6/14/2019    RIGHT ENDOSCOPIC CARPAL TUNNEL RELEASE performed by Abril Siu MD at [facility] Left 7/2/2019    LEFT ENDOSCOPIC CARPAL TUNNEL RELEASE performed by Abril Siu MD at [address] Right 1 22 13    CATARACT REMOVAL WITH IMPLANT Left 07/02/13    CHOLECYSTECTOMY  1980's    open    EPIDURAL STEROID INJECTION N/A 7/9/2019    LUMBAR EPIDURAL STEROID INJECTION L4-5 UNDER FLUOROSCOPIC GUIDANCE performed by Krystin Cummings MD at [place] [address] Bilateral 9/3/2019    ATTAMPTED BILATERAL LUMBAR TRANSFORAMINAL EPIDURAL STEROID INJECTION LEFT L4 AND RIGHT  L5 UNDER FLUOROSCOPIC GUIDANCE performed by Krystin Cummings MD at [address] [place] [address]  1980's    presents today for evaluation and management of tingling and heaviness to bilateral lower extremities. Patient began having these symptoms last spring/early summer proximately 1 year ago. Patient sees pain management in [place] who has completed epidurals in the past which were helpful however her last epidural approximately 6 months ago has not been beneficial.  Patient was started on gabapentin approximately 1 year ago most recently titrated up to 600 mg twice daily about 6 months ago. Patient had MRI of lumbar spine in May 2019 that shows multilevel disc bulges. EMG was completed in February showed demyelinating, axonal, motor and sensorimotor bilateral ulnar neuropathy. EMG completed July 2018 showed bilateral carpal tunnel and bilateral S1 radiculopathy. Labs completed in April 2019 were unremarkable for etiology of her neuropathy with the exception of mildly low folate at 4.4. Patient is not diabetic. Patient has not had any falls but feels that she is unsteady on her feet and has to hold onto things often to walk. Patient denies falls, speech or swallowing difficulties, headache, dizziness or vision changes. In addition to this patient has osteoarthritis, degenerative disc disease, bilateral carpal tunnel and lumbar radiculopathy. Patient has had multiple nerve blocks and epidurals in the past, carpal tunnel release, appendectomy, hysterectomy, cholecystectomy and cataract surgery. Patient smokes a pack of cigarettes daily but denies EtOH and illicit drug use. Patient sleeps approximately 8 hours a night although broken due to voiding she wakes up well rested. Patient reports low appetite only eating crackers and coffee for breakfast, half a sandwich for lunch and for dinner followed by ice cream or cookies for bedtime snack. Patient drinks 2 glasses of water daily. Patient drinks about 4 cups of coffee each day; randomly drinks soda. Patient reports mild to low stress level.   Patient does not exercise. Objective:       /75 (Site: Right Upper Arm)   Pulse 79   Temp 98.1 °F (36.7 °C)   Ht 5' 3\" (1.6 m)   Wt 140 lb (63.5 kg)   SpO2 96%   BMI 24.80 kg/m²     General appearance: alert, appears stated age, cooperative and in no distress  Head: normocephalic, without obvious abnormality, atraumatic  Eyes: conjunctivae/corneas clear; no drainage  Neck:  supple, symmetrical, trachea midline  Back: symmetric, no curvature.  ROM normal.   Lungs: clear to auscultation bilaterally  Heart: regular rate and rhythm  Abdomen: soft, non-tender; bowel sounds normal  Extremities: normal, atraumatic, no cyanosis or edema  Pulses: 2+ and symmetric  Skin:  color, texture, turgor normal--no rashes or lesions      Mental Status: alert and oriented x 4, pleasant and conversant    Appropriate attention/concentration  Intact fundus of knowledge  Memories intact    Speech: no dysarthria  Language: no aphasias---reading, writing, repetition, and object identification intact    Cranial Nerves:  I: smell  intact   II: visual acuity     II: visual fields Full to finger counting bilaterally   II: pupils PER dislocation R frequently off will do cerebral L   III,VII: ptosis None   III,IV,VI: extraocular muscles  EOMI without nystagmus   V: mastication Normal   V: facial light touch sensation  Normal   V,VII: corneal reflex     VII: facial muscle function - upper  Normal   VII: facial muscle function - lower Normal   VIII: hearing Normal   IX: soft palate elevation  Normal   IX,X: gag reflex    XI: trapezius strength  5/5   XI: sternocleidomastoid strength 5/5   XI: neck extension strength  5/5   XII: tongue strength  Normal     Motor:  5/5 BUE  4/5 BLE   Normal bulk and tone  No drift   No abnormal movements    Sensory:  LT normal  Pinprick decreased to BUE; minimally felt to lower extremities in stocking glove distribution  Vibration minimally felt to stocking glove distribution right more than left    Coordination: of this visit    Assessment:     Bilateral S1 radiculopathy    Found on EMG of July 2018   Labs unremarkable in April 2019 with exception of low folate 4.4    Patient was on folate however stopped taking it---  \" hates pills\"   Gabapentin 600 twice daily will be increased to 3 times daily--- can be increased slowly   Physical therapy may be helpful in the near future   Patient also follows with pain management regularly    Bilateral carpal tunnel   Not mentioned at all today during visit   Bilateral wrist braces at night may be beneficial    Lifestyle modification   Better sleep hygiene, adequate nutrition and hydration, caffeine reduction, regular exercise regimen   Smoking cessation encouraged today     Plan:     Increase gabapentin to 600 3 times daily  Patient agrees to resume folate supplements lifestyle modifications  Lifestyle modifications  Follow-up in 3 months--- virtual visit is an option  Call with any issues      VALENTIN Trejo NP-C  4:40 PM  9/4/2020    I spent 60 minutes with this patient obtaining the HPI and discussing the exam with greater than 50% of the time providing counseling and education on medications and other treatment plans. All questions were answered prior to leaving my office.

## 2020-09-04 NOTE — PATIENT INSTRUCTIONS
Patient Education        Learning About Peripheral Neuropathy  What is peripheral neuropathy? Peripheral neuropathy is a problem that affects the peripheral nerves. These nerves lead from the spinal cord to other parts of the body. They control your sense of touch, how you feel pain and temperature, and your muscle strength. Most of the time the problem starts in the fingers and toes. As it gets worse, it moves into the limbs. It can cause pain and loss of feeling in the feet, legs, and hands. What causes it? There are several causes:  · Diabetes. This is the most common cause. If your blood sugar is too high for too long, it can damage the nerves. · Kidney problems. These can lead to toxic substances in the blood that damage nerves. · Low levels of thyroid hormone (hypothyroidism). This may cause swelling of the tissues around the nerves, which can put pressure on them. · Infectious or inflammatory diseases. Examples are HIV and Guillain-Barré syndrome. These diseases can damage the nerves. · Peripheral nerve injury. A physical injury can damage the nerves. Injuries can be from things like falls, car crashes, or playing sports. · Overusing alcohol and not eating a healthy diet. These can lead to your body not having enough of certain vitamins, such as vitamin B-12. This can damage nerves. · Being exposed to toxic substances. These include lead, mercury, and certain medicines, such as those used for chemotherapy. Sometimes the cause isn't known. What are the symptoms? Symptoms of peripheral neuropathy can occur slowly over time. The most common ones are:  · Numbness, tightness, and tingling, especially in the legs, hands, and feet. · Loss of feeling. · Burning, shooting, or stabbing pain in the legs, hands, and feet. Often the pain is worse at night. · Weakness and loss of balance. What can happen if you have it?   If peripheral neuropathy gets worse, it can lead to a complete lack of feeling in your hands or feet. This can make you more likely to injure them. It may lead to calluses and blisters. It can also lead to bone and joint problems, infection, and ulcers. For instance, small, repeated injuries to the foot may lead to bigger problems. This can happen because you can't feel the injuries. Reduced feeling in the feet can also change your step, leading to bone or joint problems. If untreated, foot problems can become so severe that the foot or lower leg may have to be amputated. But treatment can slow down peripheral neuropathy. And it's a good idea to take care to avoid injury. How is it diagnosed? To diagnose peripheral neuropathy, your doctor will ask you about:  · Your symptoms. · Your medical history. This may include your use of alcohol, risk of HIV infection, or exposure to toxic substances. · Your family's medical history, including nerve disease. Your doctor may test how well you can feel touch, temperature, and pain. You may also have blood tests. These tests will help the doctor find out if you have conditions that can cause neuropathy. Examples are diabetes, vitamin deficiencies, thyroid disease, and kidney problems. How is it treated? Treatment for peripheral neuropathy can relieve symptoms. This is done by treating the health problem that's causing it. For example, if you have diabetes, keeping your blood sugar within your target range may help. Or maybe your body lacks certain vitamins caused by drinking too much alcohol. In that case, treatment may include eating a healthy diet, taking vitamins, and stopping alcohol use. You may have physical therapy. This can increase muscle strength and help build muscle control. Over-the-counter medicine can relieve mild nerve pain. Your doctor may also prescribe medicine to help with severe pain, numbness, tingling, and weakness. If you have neuropathy in your feet, it's a good idea to have them checked during each office visit.  This can

## 2020-11-04 ENCOUNTER — TELEPHONE (OUTPATIENT)
Dept: NEUROLOGY | Age: 80
End: 2020-11-04

## 2020-11-04 NOTE — TELEPHONE ENCOUNTER
3 attempts to contact patient to r/s appointment on 12/16 w/VALENTIN Ponce.    Letter sent  Electronically signed by Erika Burgess on 11/4/20 at 9:15 AM EST

## 2021-01-14 ENCOUNTER — VIRTUAL VISIT (OUTPATIENT)
Dept: NEUROLOGY | Age: 81
End: 2021-01-14
Payer: MEDICARE

## 2021-01-14 DIAGNOSIS — G56.03 BILATERAL CARPAL TUNNEL SYNDROME: ICD-10-CM

## 2021-01-14 DIAGNOSIS — M54.16 LUMBAR RADICULOPATHY: Primary | ICD-10-CM

## 2021-01-14 PROCEDURE — 99442 PR PHYS/QHP TELEPHONE EVALUATION 11-20 MIN: CPT | Performed by: NURSE PRACTITIONER

## 2021-01-14 NOTE — PROGRESS NOTES
Sarah Ruano was read the following message We want to confirm that, for purposes of billing, this is a virtual visit with your provider for which we will submit a claim for reimbursement with your insurance company. You will be responsible for any copays, coinsurance amounts or other amounts not covered by your insurance company. If you do not accept this, unfortunately we will not be able to schedule or proceed with a virtual visit with the provider. Do you accept? Rock Dover responded Yes .

## 2021-01-14 NOTE — PROGRESS NOTES
[facility]. Eduardo Yang M.D., F.A.C.P. Dmitry Fung, DNP, APRN, CNS  Jorge Hopper. Ramon Peña, MSN, APRN-FNP-C  Cierra Baer MSN, APRN, FNP-C  [place] DOS[place], ALAN Guo MSN, APRN, FNP-C  [address], [address]  [place], [address] [place]  Phone: [phone]  Fax: [phone]             Manohar Fuentes is a 80 y.o. female evaluated via telephone on 1/14/2021. The patient and/or health care decision maker is aware that that he/she may receive a bill for this telephone service, depending on his/her insurance coverage, and has provided verbal consent to proceed: Yes    I affirm this is a Patient Initiated Episode with an Established Patient who has not had a related appointment within my department in the past 7 days or scheduled within the next 24 hours. The patient's identity was verified at the start of the call. Others involved in call: Her daughter Rajwinder Todd     Total Time: minutes: 11-20 minutes    Note: not billable if this call serves to triage the patient into an appointment for the relevant concern    HPI:     Since patient's last visit her legs have improved on gabapentin. Patient has to hold onto things more often than normal and at times she feels unbalanced. She has not had any falls. Patient denies dizziness. Patient sleeps approximately 8 hours each night. Patient only eats dinner--- skipping breakfast and lunch. Patient continues to snack through the day on unhealthy foods. Patient drinks about 2 cups of water a day. Patient denies any new medical issues. No chest pain or palpitations  No SOB  No vertigo, lightheadedness or loss of consciousness  No falls, tripping or stumbling  No incontinence of bowels or bladder  No itching or bruising   No numbness, tingling or focal arm/leg weakness    ROS otherwise negative      Medications:     Prior to Admission medications    Medication Sig Start Date End Date Taking? moderate canal and lateral   recess stenosis. 2. Multilevel disc bulges as discussed. 3. No disc herniations identified. MRI Lumbar spine 4/16/2019:  1. Scoliosis. 2. Spondylosis. 3. L4-5 spondylolisthesis     XR Lumbar spine 7/1/2019:   Grade 1 anterolisthesis of L4 on L5 which slightly reduces with   extension.      All labs and images personally reviewed today    Assessment:      Bilateral S1 radiculopathy               Found on EMG of July 2018              Labs unremarkable in April 2019 with exception of low folate 4.4                          Patient was on folate however stopped taking it---  \" hates pills\" and caused diarrhea              Continue gabapentin 600 3 times daily              Physical therapy may be helpful in the near future--- currently patient refuses              Patient also follows with pain management regularly     Bilateral carpal tunnel              Not mentioned at all today during visit              Bilateral wrist braces at night may be beneficial     Lifestyle modification              Better sleep hygiene, adequate nutrition and hydration, caffeine reduction, regular exercise regimen              Smoking cessation encouraged today     Plan:     Continue gabapentin to 600 3 times daily  Lifestyle modifications  Follow-up in 6 months--- virtual visit is an option  Call with any issues      VALENTIN Ramírez NP-C  3:42 PM  1/14/2021

## 2021-09-07 RX ORDER — GABAPENTIN 600 MG/1
600 TABLET ORAL 3 TIMES DAILY
Qty: 90 TABLET | Refills: 0 | Status: SHIPPED
Start: 2021-09-07 | End: 2021-09-28 | Stop reason: SDUPTHER

## 2021-09-08 ENCOUNTER — TELEPHONE (OUTPATIENT)
Dept: NEUROLOGY | Age: 81
End: 2021-09-08

## 2021-09-08 NOTE — TELEPHONE ENCOUNTER
LM to have patient call back to set up follow up with VALENTIN Garcia  Electronically signed by Denise Tarango on 9/8/21 at 2:52 PM EDT

## 2021-09-28 ENCOUNTER — OFFICE VISIT (OUTPATIENT)
Dept: NEUROLOGY | Age: 81
End: 2021-09-28
Payer: MEDICARE

## 2021-09-28 VITALS
HEIGHT: 64 IN | TEMPERATURE: 97.8 F | SYSTOLIC BLOOD PRESSURE: 148 MMHG | DIASTOLIC BLOOD PRESSURE: 85 MMHG | BODY MASS INDEX: 23.9 KG/M2 | HEART RATE: 83 BPM | RESPIRATION RATE: 18 BRPM | OXYGEN SATURATION: 95 % | WEIGHT: 140 LBS

## 2021-09-28 DIAGNOSIS — R20.2 PARESTHESIA: Primary | ICD-10-CM

## 2021-09-28 PROCEDURE — 99213 OFFICE O/P EST LOW 20 MIN: CPT | Performed by: NURSE PRACTITIONER

## 2021-09-28 PROCEDURE — 1123F ACP DISCUSS/DSCN MKR DOCD: CPT | Performed by: NURSE PRACTITIONER

## 2021-09-28 PROCEDURE — 4004F PT TOBACCO SCREEN RCVD TLK: CPT | Performed by: NURSE PRACTITIONER

## 2021-09-28 PROCEDURE — G8427 DOCREV CUR MEDS BY ELIG CLIN: HCPCS | Performed by: NURSE PRACTITIONER

## 2021-09-28 PROCEDURE — 4040F PNEUMOC VAC/ADMIN/RCVD: CPT | Performed by: NURSE PRACTITIONER

## 2021-09-28 PROCEDURE — 1090F PRES/ABSN URINE INCON ASSESS: CPT | Performed by: NURSE PRACTITIONER

## 2021-09-28 PROCEDURE — G8400 PT W/DXA NO RESULTS DOC: HCPCS | Performed by: NURSE PRACTITIONER

## 2021-09-28 PROCEDURE — G8420 CALC BMI NORM PARAMETERS: HCPCS | Performed by: NURSE PRACTITIONER

## 2021-09-28 RX ORDER — GABAPENTIN 600 MG/1
600 TABLET ORAL 3 TIMES DAILY
Qty: 90 TABLET | Refills: 0 | Status: SHIPPED
Start: 2021-09-28 | End: 2021-11-09 | Stop reason: SDUPTHER

## 2021-09-28 NOTE — PROGRESS NOTES
[facility]. Ivory Ba M.D., F.A.C.P. Coby Kessler, HUMBLE, APRN, CNS  Josy Luong. Mariely Contreras, MSN, APRN-FNP-C  Catracho Florez MSN, APRN, FNP-C  Stan CLARK, PA-C  [address] MSN, APRN, FNP-C  [address], [facility] [place], [address]  Phone: [phone]  Fax: [phone]       Brandon Baird is a 80 y.o. right handed female     Patient presents today for evaluation and management of numbness and tingling to bilateral lower extremities. Patient presents with her daughter--- both were deemed good historians. Patient began having these symptoms spring or early summer 2019. During her initial visit she was following with pain management who had completed epidurals in the past which were helpful however her last epidural was felt to be less beneficial.  Patient was started on gabapentin in 2019 and had been recently titrated up to 600 mg twice daily about 6 months prior to her visit here. Patient had MRI of lumbar spine in May 2019 that shows multilevel disc bulges. EMG was completed in February showed demyelinating, axonal, motor and sensorimotor bilateral ulnar neuropathy. EMG completed July 2018 showed bilateral carpal tunnel and bilateral S1 radiculopathy. Labs completed in April 2019 were unremarkable for etiology of her neuropathy with the exception of mildly low folate at 4.4. Patient is not diabetic. Patient has not had any falls but feels that she is unsteady on her feet and has to hold onto things often to walk. Patient denies falls, speech or swallowing difficulties, headache, dizziness or vision changes. Patient continues to have back, feet and hand pain. Patient is using topicals to help with pain control. Topricin over-the-counter cream is being used on her back and her hands and fibromyalgia cream is being used to her feet. Patient continues to take gabapentin 600 mg 3 times daily.   Patient is no longer under the care of pain management. There have been no new medical issues, no falls or new medication changes since her last visit with me. Patient has not had labs since 2019. In addition to this patient has osteoarthritis, degenerative disc disease, bilateral carpal tunnel and lumbar radiculopathy. Patient has had multiple nerve blocks and epidurals in the past, carpal tunnel release, appendectomy, hysterectomy, cholecystectomy and cataract surgery. Patient sleeps approximately 8 hours a night although broken due to voiding she wakes up well rested. Patient reports low appetite only eating crackers and coffee for breakfast, half a sandwich for lunch and for dinner followed by ice cream or cookies for bedtime snack. Patient drinks 2 glasses of water daily. Patient drinks about 4 cups of coffee each day; randomly drinks soda. Patient reports mild to low stress level. Patient does not exercise. Patient smokes a pack of cigarettes daily, approximately a pack a day but denies EtOH and illicit drug use. She started smoking over 65 years ago. She has a 60.00 pack-year smoking history. She has never used smokeless tobacco. She reports that she does not drink alcohol or use drugs. Patient is a . Patient retired from [employer] in [place]. Patient has 3 adult children. Objective:       BP (!) 148/85 (Site: Right Upper Arm, Position: Sitting, Cuff Size: Medium Adult)   Pulse 83   Temp 97.8 °F (36.6 °C)   Resp 18   Ht 5' 4\" (1.626 m)   Wt 140 lb (63.5 kg)   SpO2 95%   BMI 24.03 kg/m²     General appearance: alert, appears stated age, cooperative and in no distress  Head: normocephalic, without obvious abnormality, atraumatic  Eyes: conjunctivae/corneas clear; no drainage  Neck:  supple, symmetrical, trachea midline  Back: symmetric, no curvature.  ROM normal.   Lungs: clear to auscultation bilaterally  Heart: regular rate and rhythm  Abdomen: soft, non-tender; bowel sounds normal  Extremities: normal, atraumatic, no cyanosis or edema  Pulses: 2+ and symmetric  Skin:  color, texture, turgor normal--no rashes or lesions      Mental Status: alert and oriented x 4, pleasant and conversant    Appropriate attention/concentration  Intact fundus of knowledge  Memories intact    Speech: no dysarthria  Language: no aphasias---reading, writing, repetition, and object identification intact    Cranial Nerves:  I: smell  intact   II: visual acuity     II: visual fields Full to finger counting bilaterally   II: pupils PERRL    III,VII: ptosis None   III,IV,VI: extraocular muscles  EOMI without nystagmus   V: mastication Normal   V: facial light touch sensation  Normal   V,VII: corneal reflex     VII: facial muscle function - upper  Normal   VII: facial muscle function - lower Normal   VIII: hearing Normal   IX: soft palate elevation  Normal   IX,X: gag reflex    XI: trapezius strength  5/5   XI: sternocleidomastoid strength 5/5   XI: neck extension strength  5/5   XII: tongue strength  Normal     Motor:  5/5 throughout  Normal bulk and tone  No drift   No abnormal movements    Sensory:  LT normal  PP decreased in stocking glove distribution  Vibration minimally felt in stocking glove distribution R>L   Failed proprioception test bilaterally today    Coordination:   FN, FFM and MICHEAL normal  HS normal    Gait:  Slow cautious gait with cane    DTR:   Right Brachioradialis reflex 1+  Left Brachioradialis reflex 1+  Right Biceps reflex 1+  Left Biceps reflex 1+  Right Triceps reflex 1+  Left Triceps reflex 1+  Right Quadriceps reflex 1+  Left Quadriceps reflex 1+  Right Achilles reflex 1+  Left Achilles reflex 1+    No Babinskis  No Hopkins's    No other pathological reflexes    Laboratory/Radiology:  ry/Radiology:     CBC:   Lab Results   Component Value Date    WBC 9.0 04/16/2019    RBC 5.61 04/16/2019    HGB 18.9 04/16/2019    HCT 55.4 04/16/2019    MCV 98.8 04/16/2019    MCH 33.7 04/16/2019    [facility] 34.1 04/16/2019    RDW 12.9 04/16/2019     04/16/2019    MPV 8.8 04/16/2019     CMP:    Lab Results   Component Value Date     04/16/2019    K 4.1 04/16/2019    CL 98 04/16/2019    CO2 21 04/16/2019    BUN 10 04/16/2019    CREATININE 0.8 04/16/2019    GFRAA >60 04/16/2019    LABGLOM >60 04/16/2019    GLUCOSE 85 04/16/2019    PROT 8.0 04/16/2019    LABALBU 4.4 04/16/2019    CALCIUM 9.7 04/16/2019    BILITOT 0.4 04/16/2019    ALKPHOS 92 04/16/2019    AST 18 04/16/2019    ALT 11 04/16/2019     Hepatic Function Panel:    Lab Results   Component Value Date    ALKPHOS 92 04/16/2019    ALT 11 04/16/2019    AST 18 04/16/2019    PROT 8.0 04/16/2019    BILITOT 0.4 04/16/2019    LABALBU 4.4 04/16/2019     Lab Results   Component Value Date    LABA1C 5.2 04/16/2019   TSH:    Lab Results   Component Value Date    TSH 2.090 04/16/2019   FOLATE:    Lab Results   Component Value Date    FOLATE 4.4 04/16/2019     MRI lumbar spine w/o 5/31/2019:  1. Grade 1 spondylolisthesis of L4 with moderate canal and lateral    recess stenosis. 2. Multilevel disc bulges as discussed. 3. No disc herniations identified.           X-ray lumbar spine 7/1/2019:  Grade 1 anterolisthesis of L4 on L5 which slightly reduces with    extension.       All labs and images were personally reviewed at the time of this visit    Assessment:     Bilateral S1 radiculopathy    Found on EMG of July 2018   Labs unremarkable in April 2019 with exception of low folate 4.4    Patient was on folate however stopped taking it---  \" hates pills\" and diarrhea   Continue gabapentin 600 mg 3 times daily   Patient has not seen pain management recently   Physical therapy may be helpful in the near future; currently patient uninterested    Bilateral carpal tunnel   Not mentioned at all today during visit   Bilateral wrist braces at night may be beneficial    Lifestyle modification   Better sleep hygiene, adequate nutrition and hydration, caffeine reduction, regular exercise regimen   Smoking cessation encouraged today     Plan:     Continue gabapentin to 600 3 times daily  Patient will have labs completed--- CBC, CMP, A1c, vitamin D, vitamin B12, folate, and TSH  Continue use of over-the-counter creams for additional pain control  Lifestyle modifications  Follow-up in 6 months--- virtual visit is an option  Call with any issues      VALENTIN Mcclure NP, VALENTIN NP-C  2:31 PM  9/28/2021    I spent 25 minutes with this patient obtaining the HPI and discussing the exam with greater than 50% of the time providing counseling and education on medications and other treatment plans. All questions were answered prior to leaving my office.

## 2021-10-15 DIAGNOSIS — R20.2 PARESTHESIA: ICD-10-CM

## 2021-10-15 LAB
ALBUMIN SERPL-MCNC: 4.2 G/DL (ref 3.5–5.2)
ALP BLD-CCNC: 92 U/L (ref 35–104)
ALT SERPL-CCNC: 11 U/L (ref 0–32)
ANION GAP SERPL CALCULATED.3IONS-SCNC: 16 MMOL/L (ref 7–16)
AST SERPL-CCNC: 20 U/L (ref 0–31)
BILIRUB SERPL-MCNC: 0.8 MG/DL (ref 0–1.2)
BUN BLDV-MCNC: 12 MG/DL (ref 6–23)
CALCIUM SERPL-MCNC: 9.5 MG/DL (ref 8.6–10.2)
CHLORIDE BLD-SCNC: 103 MMOL/L (ref 98–107)
CO2: 23 MMOL/L (ref 22–29)
CREAT SERPL-MCNC: 0.8 MG/DL (ref 0.5–1)
FOLATE: 10.2 NG/ML (ref 4.8–24.2)
GFR AFRICAN AMERICAN: >60
GFR NON-AFRICAN AMERICAN: >60 ML/MIN/1.73
GLUCOSE BLD-MCNC: 99 MG/DL (ref 74–99)
HBA1C MFR BLD: 5.2 % (ref 4–5.6)
HCT VFR BLD CALC: 49.9 % (ref 34–48)
HEMOGLOBIN: 16.9 G/DL (ref 11.5–15.5)
MCH RBC QN AUTO: 32.6 PG (ref 26–35)
MCHC RBC AUTO-ENTMCNC: 33.9 % (ref 32–34.5)
MCV RBC AUTO: 96.1 FL (ref 80–99.9)
PDW BLD-RTO: 13.1 FL (ref 11.5–15)
PLATELET # BLD: 212 E9/L (ref 130–450)
PMV BLD AUTO: 8.9 FL (ref 7–12)
POTASSIUM SERPL-SCNC: 4.3 MMOL/L (ref 3.5–5)
RBC # BLD: 5.19 E12/L (ref 3.5–5.5)
SODIUM BLD-SCNC: 142 MMOL/L (ref 132–146)
TOTAL PROTEIN: 6.5 G/DL (ref 6.4–8.3)
TSH SERPL DL<=0.05 MIU/L-ACNC: 2.07 UIU/ML (ref 0.27–4.2)
VITAMIN B-12: 552 PG/ML (ref 211–946)
VITAMIN D 25-HYDROXY: 22 NG/ML (ref 30–100)
WBC # BLD: 7.1 E9/L (ref 4.5–11.5)

## 2021-10-22 ENCOUNTER — TELEPHONE (OUTPATIENT)
Dept: NEUROLOGY | Age: 81
End: 2021-10-22

## 2021-10-22 NOTE — TELEPHONE ENCOUNTER
LM for patient to call office back regarding labs  Electronically signed by Edwin Parra on 10/22/21 at 11:30 AM EDT

## 2021-10-22 NOTE — TELEPHONE ENCOUNTER
----- Message from Thurnell Krabbe, APRN - NP sent at 10/19/2021  3:16 PM EDT -----  Recommend starting vitamin D supplementation.   Otherwise labs were normal.

## 2021-11-09 RX ORDER — GABAPENTIN 600 MG/1
600 TABLET ORAL 3 TIMES DAILY
Qty: 90 TABLET | Refills: 0 | Status: SHIPPED
Start: 2021-11-09 | End: 2021-11-18 | Stop reason: SDUPTHER

## 2021-11-22 RX ORDER — GABAPENTIN 600 MG/1
600 TABLET ORAL 3 TIMES DAILY
Qty: 90 TABLET | Refills: 0 | Status: SHIPPED
Start: 2021-11-22 | End: 2021-12-07 | Stop reason: SDUPTHER

## 2021-12-07 RX ORDER — GABAPENTIN 600 MG/1
600 TABLET ORAL 3 TIMES DAILY
Qty: 90 TABLET | Refills: 5 | Status: SHIPPED | OUTPATIENT
Start: 2021-12-07 | End: 2022-06-05

## 2021-12-07 NOTE — TELEPHONE ENCOUNTER
Patient's daughter called in requesting refills on Erin's gabapentin. She is asking to have refills added so she doesn't have to call in every month.

## 2024-05-26 ENCOUNTER — APPOINTMENT (OUTPATIENT)
Dept: CT IMAGING | Age: 84
DRG: 391 | End: 2024-05-26
Payer: COMMERCIAL

## 2024-05-26 ENCOUNTER — APPOINTMENT (OUTPATIENT)
Dept: GENERAL RADIOLOGY | Age: 84
DRG: 391 | End: 2024-05-26
Payer: COMMERCIAL

## 2024-05-26 ENCOUNTER — HOSPITAL ENCOUNTER (INPATIENT)
Age: 84
LOS: 4 days | Discharge: HOME | DRG: 391 | End: 2024-05-30
Attending: EMERGENCY MEDICINE | Admitting: INTERNAL MEDICINE
Payer: COMMERCIAL

## 2024-05-26 DIAGNOSIS — R94.31 ABNORMAL EKG: ICD-10-CM

## 2024-05-26 DIAGNOSIS — R13.10 DYSPHAGIA, UNSPECIFIED TYPE: ICD-10-CM

## 2024-05-26 DIAGNOSIS — I24.9 ACUTE CORONARY SYNDROME (HCC): ICD-10-CM

## 2024-05-26 DIAGNOSIS — R94.31 ABNORMAL ELECTROCARDIOGRAPHY: ICD-10-CM

## 2024-05-26 DIAGNOSIS — R07.9 CHEST PAIN, UNSPECIFIED TYPE: ICD-10-CM

## 2024-05-26 DIAGNOSIS — K20.90 ESOPHAGITIS: Primary | ICD-10-CM

## 2024-05-26 LAB
ALBUMIN SERPL-MCNC: 4.1 G/DL (ref 3.5–5.2)
ALP SERPL-CCNC: 110 U/L (ref 35–104)
ALT SERPL-CCNC: 13 U/L (ref 0–32)
ANION GAP SERPL CALCULATED.3IONS-SCNC: 16 MMOL/L (ref 7–16)
AST SERPL-CCNC: 21 U/L (ref 0–31)
BASOPHILS # BLD: 0.03 K/UL (ref 0–0.2)
BASOPHILS NFR BLD: 1 % (ref 0–2)
BILIRUB SERPL-MCNC: 0.6 MG/DL (ref 0–1.2)
BUN SERPL-MCNC: 16 MG/DL (ref 6–23)
CALCIUM SERPL-MCNC: 9.7 MG/DL (ref 8.6–10.2)
CHLORIDE SERPL-SCNC: 101 MMOL/L (ref 98–107)
CK SERPL-CCNC: 35 U/L (ref 20–180)
CO2 SERPL-SCNC: 32 MMOL/L (ref 22–29)
CREAT SERPL-MCNC: 1.1 MG/DL (ref 0.5–1)
EOSINOPHIL # BLD: 0.14 K/UL (ref 0.05–0.5)
EOSINOPHILS RELATIVE PERCENT: 2 % (ref 0–6)
ERYTHROCYTE [DISTWIDTH] IN BLOOD BY AUTOMATED COUNT: 13.1 % (ref 11.5–15)
GFR, ESTIMATED: 47 ML/MIN/1.73M2
GLUCOSE SERPL-MCNC: 92 MG/DL (ref 74–99)
HCT VFR BLD AUTO: 48.3 % (ref 34–48)
HGB BLD-MCNC: 15.8 G/DL (ref 11.5–15.5)
IMM GRANULOCYTES # BLD AUTO: 0.03 K/UL (ref 0–0.58)
IMM GRANULOCYTES NFR BLD: 1 % (ref 0–5)
LACTATE BLDV-SCNC: 1.3 MMOL/L (ref 0.5–2.2)
LYMPHOCYTES NFR BLD: 1.27 K/UL (ref 1.5–4)
LYMPHOCYTES RELATIVE PERCENT: 19 % (ref 20–42)
MAGNESIUM SERPL-MCNC: 2.5 MG/DL (ref 1.6–2.6)
MCH RBC QN AUTO: 31.3 PG (ref 26–35)
MCHC RBC AUTO-ENTMCNC: 32.7 G/DL (ref 32–34.5)
MCV RBC AUTO: 95.6 FL (ref 80–99.9)
MONOCYTES NFR BLD: 0.57 K/UL (ref 0.1–0.95)
MONOCYTES NFR BLD: 9 % (ref 2–12)
NEUTROPHILS NFR BLD: 69 % (ref 43–80)
NEUTS SEG NFR BLD: 4.59 K/UL (ref 1.8–7.3)
PLATELET # BLD AUTO: 228 K/UL (ref 130–450)
PMV BLD AUTO: 9.2 FL (ref 7–12)
POTASSIUM SERPL-SCNC: 4 MMOL/L (ref 3.5–5)
PROT SERPL-MCNC: 7.5 G/DL (ref 6.4–8.3)
RBC # BLD AUTO: 5.05 M/UL (ref 3.5–5.5)
SODIUM SERPL-SCNC: 149 MMOL/L (ref 132–146)
TROPONIN I SERPL HS-MCNC: 22 NG/L (ref 0–9)
TROPONIN I SERPL HS-MCNC: 24 NG/L (ref 0–9)
WBC OTHER # BLD: 6.6 K/UL (ref 4.5–11.5)

## 2024-05-26 PROCEDURE — 84484 ASSAY OF TROPONIN QUANT: CPT

## 2024-05-26 PROCEDURE — 93005 ELECTROCARDIOGRAM TRACING: CPT | Performed by: EMERGENCY MEDICINE

## 2024-05-26 PROCEDURE — 6360000002 HC RX W HCPCS

## 2024-05-26 PROCEDURE — C9113 INJ PANTOPRAZOLE SODIUM, VIA: HCPCS | Performed by: EMERGENCY MEDICINE

## 2024-05-26 PROCEDURE — 82550 ASSAY OF CK (CPK): CPT

## 2024-05-26 PROCEDURE — 71250 CT THORAX DX C-: CPT

## 2024-05-26 PROCEDURE — 99285 EMERGENCY DEPT VISIT HI MDM: CPT

## 2024-05-26 PROCEDURE — 6360000002 HC RX W HCPCS: Performed by: EMERGENCY MEDICINE

## 2024-05-26 PROCEDURE — 96374 THER/PROPH/DIAG INJ IV PUSH: CPT

## 2024-05-26 PROCEDURE — 71045 X-RAY EXAM CHEST 1 VIEW: CPT

## 2024-05-26 PROCEDURE — A4216 STERILE WATER/SALINE, 10 ML: HCPCS

## 2024-05-26 PROCEDURE — 6370000000 HC RX 637 (ALT 250 FOR IP): Performed by: EMERGENCY MEDICINE

## 2024-05-26 PROCEDURE — 2580000003 HC RX 258

## 2024-05-26 PROCEDURE — A4216 STERILE WATER/SALINE, 10 ML: HCPCS | Performed by: EMERGENCY MEDICINE

## 2024-05-26 PROCEDURE — 83605 ASSAY OF LACTIC ACID: CPT

## 2024-05-26 PROCEDURE — 96375 TX/PRO/DX INJ NEW DRUG ADDON: CPT

## 2024-05-26 PROCEDURE — C9113 INJ PANTOPRAZOLE SODIUM, VIA: HCPCS

## 2024-05-26 PROCEDURE — 80053 COMPREHEN METABOLIC PANEL: CPT

## 2024-05-26 PROCEDURE — 70450 CT HEAD/BRAIN W/O DYE: CPT

## 2024-05-26 PROCEDURE — 83735 ASSAY OF MAGNESIUM: CPT

## 2024-05-26 PROCEDURE — 1200000000 HC SEMI PRIVATE

## 2024-05-26 PROCEDURE — 2580000003 HC RX 258: Performed by: EMERGENCY MEDICINE

## 2024-05-26 PROCEDURE — 85025 COMPLETE CBC W/AUTO DIFF WBC: CPT

## 2024-05-26 PROCEDURE — 2500000003 HC RX 250 WO HCPCS: Performed by: EMERGENCY MEDICINE

## 2024-05-26 RX ORDER — SODIUM CHLORIDE 9 MG/ML
INJECTION, SOLUTION INTRAVENOUS CONTINUOUS
Status: DISCONTINUED | OUTPATIENT
Start: 2024-05-26 | End: 2024-05-28

## 2024-05-26 RX ORDER — FENTANYL CITRATE 0.05 MG/ML
25 INJECTION, SOLUTION INTRAMUSCULAR; INTRAVENOUS ONCE
Status: COMPLETED | OUTPATIENT
Start: 2024-05-26 | End: 2024-05-26

## 2024-05-26 RX ORDER — METOCLOPRAMIDE HYDROCHLORIDE 5 MG/ML
5 INJECTION INTRAMUSCULAR; INTRAVENOUS ONCE
Status: COMPLETED | OUTPATIENT
Start: 2024-05-26 | End: 2024-05-26

## 2024-05-26 RX ORDER — 0.9 % SODIUM CHLORIDE 0.9 %
500 INTRAVENOUS SOLUTION INTRAVENOUS ONCE
Status: COMPLETED | OUTPATIENT
Start: 2024-05-26 | End: 2024-05-26

## 2024-05-26 RX ORDER — ENOXAPARIN SODIUM 100 MG/ML
40 INJECTION SUBCUTANEOUS DAILY
Status: DISCONTINUED | OUTPATIENT
Start: 2024-05-26 | End: 2024-05-30 | Stop reason: HOSPADM

## 2024-05-26 RX ORDER — PANTOPRAZOLE SODIUM 40 MG/10ML
40 INJECTION, POWDER, LYOPHILIZED, FOR SOLUTION INTRAVENOUS ONCE
Status: COMPLETED | OUTPATIENT
Start: 2024-05-26 | End: 2024-05-26

## 2024-05-26 RX ADMIN — SODIUM CHLORIDE, PRESERVATIVE FREE 40 MG: 5 INJECTION INTRAVENOUS at 21:16

## 2024-05-26 RX ADMIN — PANTOPRAZOLE SODIUM 40 MG: 40 INJECTION, POWDER, FOR SOLUTION INTRAVENOUS at 10:20

## 2024-05-26 RX ADMIN — ENOXAPARIN SODIUM 40 MG: 100 INJECTION SUBCUTANEOUS at 18:05

## 2024-05-26 RX ADMIN — FENTANYL CITRATE 25 MCG: 50 INJECTION INTRAMUSCULAR; INTRAVENOUS at 14:39

## 2024-05-26 RX ADMIN — SODIUM CHLORIDE: 9 INJECTION, SOLUTION INTRAVENOUS at 14:37

## 2024-05-26 RX ADMIN — LIDOCAINE HYDROCHLORIDE: 20 SOLUTION ORAL at 10:21

## 2024-05-26 RX ADMIN — FAMOTIDINE 20 MG: 10 INJECTION, SOLUTION INTRAVENOUS at 11:54

## 2024-05-26 RX ADMIN — SODIUM CHLORIDE 500 ML: 9 INJECTION, SOLUTION INTRAVENOUS at 11:55

## 2024-05-26 RX ADMIN — METOCLOPRAMIDE 5 MG: 5 INJECTION, SOLUTION INTRAMUSCULAR; INTRAVENOUS at 14:37

## 2024-05-26 ASSESSMENT — PAIN DESCRIPTION - LOCATION: LOCATION: THROAT

## 2024-05-26 ASSESSMENT — PAIN SCALES - GENERAL
PAINLEVEL_OUTOF10: 0
PAINLEVEL_OUTOF10: 8

## 2024-05-26 ASSESSMENT — PAIN DESCRIPTION - DESCRIPTORS: DESCRIPTORS: DISCOMFORT;BURNING;SORE

## 2024-05-26 NOTE — PROGRESS NOTES
4 Eyes Skin Assessment     NAME:  Erin Reyna  YOB: 1940  MEDICAL RECORD NUMBER:  [id number]    The patient is being assessed for  Admission    I agree that at least one RN has performed a thorough Head to Toe Skin Assessment on the patient. ALL assessment sites listed below have been assessed.      Areas assessed by both nurses:    Head, Face, Ears, Shoulders, Back, Chest, Arms, Elbows, Hands, Sacrum. Buttock, Coccyx, Ischium, Legs. Feet and Heels, and Under Medical Devices     Reddness to coccyx  Generalized bruising        Does the Patient have a Wound? No noted wound(s)       Aristeo Prevention initiated by RN: No  Wound Care Orders initiated by RN: No    Pressure Injury (Stage 3,4, Unstageable, DTI, NWPT, and Complex wounds) if present, place Wound referral order by RN under : No    New Ostomies, if present place, Ostomy referral order under : No     Nurse 1 eSignature: Electronically signed by Lillian Hansen RN on 5/26/24 at 6:22 PM EDT    **SHARE this note so that the co-signing nurse can place an eSignature**    Nurse 2 eSignature: Electronically signed by Christi Benavides RN on 5/26/24 at 6:24 PM EDT

## 2024-05-26 NOTE — ED PROVIDER NOTES
changes with bilateral upper lobe pleuroparenchymal scarring.         XR CHEST PORTABLE   Final Result   No acute process.           No results found.    No results found.    PROCEDURES   Unless otherwise noted below, none               PAST MEDICAL HISTORY/Chronic Conditions Affecting Care      has a past medical history of Carpal tunnel syndrome, bilateral, DDD (degenerative disc disease), cervical, Elevated hemoglobin (HCC) (8/6/2018), Lumbar radiculopathy, and Osteoarthritis.     EMERGENCY DEPARTMENT COURSE    Vitals:    Vitals:    05/26/24 0845 05/26/24 0857   BP: 130/74    Pulse: 73    Resp: 16    Temp: 98.2 °F (36.8 °C)    TempSrc: Oral    SpO2: 92%    Weight:  63.5 kg (140 lb)   Height:  1.626 m (5' 4\")       Patient was given the following medications:  Medications   0.9 % sodium chloride infusion ( IntraVENous New Bag 5/26/24 1437)   aluminum & magnesium hydroxide-simethicone (MAALOX) 30 mL, lidocaine viscous hcl (XYLOCAINE) 5 mL (GI COCKTAIL) ( Oral Given 5/26/24 1021)   pantoprazole (PROTONIX) injection 40 mg (40 mg IntraVENous Given 5/26/24 1020)   sodium chloride 0.9 % bolus 500 mL (0 mLs IntraVENous Stopped 5/26/24 1412)   famotidine (PEPCID) 20 mg in sodium chloride (PF) 0.9 % 10 mL injection (20 mg IntraVENous Given 5/26/24 1154)   fentaNYL (SUBLIMAZE) injection 25 mcg (25 mcg IntraVENous Given 5/26/24 1439)   metoclopramide (REGLAN) injection 5 mg (5 mg IntraVENous Given 5/26/24 1437)                  Medical Decision Making/Differential Diagnosis:    CC/HPI Summary, Social Determinants of health, Records Reviewed, DDx, testing done/not done, ED Course, Reassessment, disposition considerations/shared decision making with patient, consults, disposition:            Medical Decision Making  History From: Patient, NH     Limitations to history : None    Erin Reyna is a 84 y.o. female who presents with worsening dysphagia.  Patient sent in from [facility] for worsening  of, but without limitation to, pneumonia, pleural effusions, cardiomegaly, pneumothorax, atelectasis, rib or sternal abnormalities including fractures. CT head without contrast to evaluate for hemorrhage or masses. CT chest for, but without limitation to, pulmonary embolism, effusions, pneumonia, dissection or acute bony fractures.      Amount and/or Complexity of Data Reviewed  Labs: ordered. Decision-making details documented in ED Course.  Radiology: ordered and independent interpretation performed. Decision-making details documented in ED Course.  ECG/medicine tests: ordered and independent interpretation performed. Decision-making details documented in ED Course.    Risk  Prescription drug management.  Decision regarding hospitalization.             CONSULTS: (Who and What was discussed)  IP CONSULT TO INTERNAL MEDICINE         I am the Primary Clinician of Record.    FINAL IMPRESSION      1. Esophagitis    2. Abnormal EKG    3. Nursing home resident          DISPOSITION/PLAN     DISPOSITION Decision To Admit 05/26/2024 12:44:19 PM      PATIENT REFERRED TO:  No follow-up provider specified.    DISCHARGE MEDICATIONS:  New Prescriptions    No medications on file       DISCONTINUED MEDICATIONS:  Discontinued Medications    No medications on file              (Please note that portions of this note were completed with a voice recognition program.  Efforts were made to edit the dictations but occasionally words are mis-transcribed.)    Kevin Crump DO (electronically signed)           Kevin Crump,   05/26/24 2280

## 2024-05-26 NOTE — H&P
[address] [address]                           HISTORY & PHYSICAL      PATIENT NAME: RONALD MCGARRY         : 1940  MED REC NO: [id number]                        ROOM: 0330  ACCOUNT NO: [id number]                       ADMIT DATE: 2024  PROVIDER: Emmanuel Wong DO      CHIEF COMPLAINT AND HISTORY OF CHIEF COMPLAINT:  This is an 84-year-old white female, who is admitted to [facility].  The patient presented to the hospital here through the emergency room from nursing home.  The patient presented to the hospital with what appeared to be odynophagia.  The patient states that over the past several days she has been having painful swallowing that got progressively worse.  The patient had not been able to take any oral liquid in with symptomatology of dehydration.  She presented to the hospital here, was seen and evaluated.  Imaging study at that time including CAT scan of the chest did not show any acute pathology.  The patient at this time because of dehydration and odynophagia was admitted to the hospital under the service of Dr. Wong and Dr. Huff.  We are currently covering for Dr. Smith.  The patient was seen in the emergency room.  Exam at this time revealed an 84-year-old white female, who was uncomfortable.  The patient was unable to swallow any water at the bedside.  CT imaging did show circumferential wall thickening in the proximal esophagus with possible esophagitis not being excluded.  From a cardiac standpoint view, the patient currently denies any cardiac history.  She does admit to having chest pain, but more related to esophageal discomfort.  The patient denies any history of hypertension, resting or exertional dyspnea, orthopnea, paroxysmal nocturnal dyspnea, intermittent claudication, or pretibial edema.  She is resting comfortably without oxygen.  Respiratory wise, she denies any

## 2024-05-27 PROBLEM — K20.90 ESOPHAGITIS: Status: ACTIVE | Noted: 2024-05-27

## 2024-05-27 LAB
25(OH)D3 SERPL-MCNC: 20.5 NG/ML (ref 30–100)
ALBUMIN SERPL-MCNC: 3.3 G/DL (ref 3.5–5.2)
ALP SERPL-CCNC: 90 U/L (ref 35–104)
ALT SERPL-CCNC: 9 U/L (ref 0–32)
ANION GAP SERPL CALCULATED.3IONS-SCNC: 16 MMOL/L (ref 7–16)
AST SERPL-CCNC: 17 U/L (ref 0–31)
BASOPHILS # BLD: 0.02 K/UL (ref 0–0.2)
BASOPHILS NFR BLD: 0 % (ref 0–2)
BILIRUB SERPL-MCNC: 0.5 MG/DL (ref 0–1.2)
BUN SERPL-MCNC: 12 MG/DL (ref 6–23)
CALCIUM SERPL-MCNC: 8.6 MG/DL (ref 8.6–10.2)
CHLORIDE SERPL-SCNC: 107 MMOL/L (ref 98–107)
CHOLEST SERPL-MCNC: 184 MG/DL
CO2 SERPL-SCNC: 25 MMOL/L (ref 22–29)
CREAT SERPL-MCNC: 0.9 MG/DL (ref 0.5–1)
EOSINOPHIL # BLD: 0.16 K/UL (ref 0.05–0.5)
EOSINOPHILS RELATIVE PERCENT: 3 % (ref 0–6)
ERYTHROCYTE [DISTWIDTH] IN BLOOD BY AUTOMATED COUNT: 13.1 % (ref 11.5–15)
FOLATE SERPL-MCNC: 8.2 NG/ML (ref 4.8–24.2)
GFR, ESTIMATED: 64 ML/MIN/1.73M2
GLUCOSE SERPL-MCNC: 75 MG/DL (ref 74–99)
HCT VFR BLD AUTO: 44.7 % (ref 34–48)
HDLC SERPL-MCNC: 43 MG/DL
HGB BLD-MCNC: 14.4 G/DL (ref 11.5–15.5)
IMM GRANULOCYTES # BLD AUTO: <0.03 K/UL (ref 0–0.58)
IMM GRANULOCYTES NFR BLD: 0 % (ref 0–5)
IRON SATN MFR SERPL: 36 % (ref 15–50)
IRON SERPL-MCNC: 62 UG/DL (ref 37–145)
LDLC SERPL CALC-MCNC: 96 MG/DL
LYMPHOCYTES NFR BLD: 1.25 K/UL (ref 1.5–4)
LYMPHOCYTES RELATIVE PERCENT: 25 % (ref 20–42)
MAGNESIUM SERPL-MCNC: 2.3 MG/DL (ref 1.6–2.6)
MCH RBC QN AUTO: 30.4 PG (ref 26–35)
MCHC RBC AUTO-ENTMCNC: 32.2 G/DL (ref 32–34.5)
MCV RBC AUTO: 94.5 FL (ref 80–99.9)
MONOCYTES NFR BLD: 0.47 K/UL (ref 0.1–0.95)
MONOCYTES NFR BLD: 9 % (ref 2–12)
NEUTROPHILS NFR BLD: 62 % (ref 43–80)
NEUTS SEG NFR BLD: 3.08 K/UL (ref 1.8–7.3)
PLATELET # BLD AUTO: 188 K/UL (ref 130–450)
PMV BLD AUTO: 9.3 FL (ref 7–12)
POTASSIUM SERPL-SCNC: 3 MMOL/L (ref 3.5–5)
POTASSIUM SERPL-SCNC: 3.6 MMOL/L (ref 3.5–5)
PROT SERPL-MCNC: 6.3 G/DL (ref 6.4–8.3)
RBC # BLD AUTO: 4.73 M/UL (ref 3.5–5.5)
SODIUM SERPL-SCNC: 148 MMOL/L (ref 132–146)
T4 FREE SERPL-MCNC: 1.5 NG/DL (ref 0.9–1.7)
TIBC SERPL-MCNC: 170 UG/DL (ref 250–450)
TRIGL SERPL-MCNC: 225 MG/DL
TROPONIN I SERPL HS-MCNC: 24 NG/L (ref 0–9)
TSH SERPL DL<=0.05 MIU/L-ACNC: 3.01 UIU/ML (ref 0.27–4.2)
VIT B12 SERPL-MCNC: 480 PG/ML (ref 211–946)
VLDLC SERPL CALC-MCNC: 45 MG/DL
WBC OTHER # BLD: 5 K/UL (ref 4.5–11.5)

## 2024-05-27 PROCEDURE — 36415 COLL VENOUS BLD VENIPUNCTURE: CPT

## 2024-05-27 PROCEDURE — 80053 COMPREHEN METABOLIC PANEL: CPT

## 2024-05-27 PROCEDURE — 2580000003 HC RX 258

## 2024-05-27 PROCEDURE — 82306 VITAMIN D 25 HYDROXY: CPT

## 2024-05-27 PROCEDURE — 83735 ASSAY OF MAGNESIUM: CPT

## 2024-05-27 PROCEDURE — 6360000002 HC RX W HCPCS: Performed by: INTERNAL MEDICINE

## 2024-05-27 PROCEDURE — 2580000003 HC RX 258: Performed by: INTERNAL MEDICINE

## 2024-05-27 PROCEDURE — A4216 STERILE WATER/SALINE, 10 ML: HCPCS

## 2024-05-27 PROCEDURE — C9113 INJ PANTOPRAZOLE SODIUM, VIA: HCPCS

## 2024-05-27 PROCEDURE — 1200000000 HC SEMI PRIVATE

## 2024-05-27 PROCEDURE — 84132 ASSAY OF SERUM POTASSIUM: CPT

## 2024-05-27 PROCEDURE — 84484 ASSAY OF TROPONIN QUANT: CPT

## 2024-05-27 PROCEDURE — 6370000000 HC RX 637 (ALT 250 FOR IP): Performed by: INTERNAL MEDICINE

## 2024-05-27 PROCEDURE — 85025 COMPLETE CBC W/AUTO DIFF WBC: CPT

## 2024-05-27 PROCEDURE — 87081 CULTURE SCREEN ONLY: CPT

## 2024-05-27 PROCEDURE — 2580000003 HC RX 258: Performed by: EMERGENCY MEDICINE

## 2024-05-27 PROCEDURE — 82746 ASSAY OF FOLIC ACID SERUM: CPT

## 2024-05-27 PROCEDURE — 82607 VITAMIN B-12: CPT

## 2024-05-27 PROCEDURE — 83550 IRON BINDING TEST: CPT

## 2024-05-27 PROCEDURE — 6360000002 HC RX W HCPCS

## 2024-05-27 PROCEDURE — 99223 1ST HOSP IP/OBS HIGH 75: CPT | Performed by: INTERNAL MEDICINE

## 2024-05-27 PROCEDURE — 80061 LIPID PANEL: CPT

## 2024-05-27 PROCEDURE — 84443 ASSAY THYROID STIM HORMONE: CPT

## 2024-05-27 PROCEDURE — 83540 ASSAY OF IRON: CPT

## 2024-05-27 PROCEDURE — 84439 ASSAY OF FREE THYROXINE: CPT

## 2024-05-27 RX ORDER — POTASSIUM CHLORIDE 20 MEQ/1
40 TABLET, EXTENDED RELEASE ORAL PRN
Status: DISCONTINUED | OUTPATIENT
Start: 2024-05-27 | End: 2024-05-30 | Stop reason: HOSPADM

## 2024-05-27 RX ORDER — MAGNESIUM SULFATE IN WATER 40 MG/ML
2000 INJECTION, SOLUTION INTRAVENOUS PRN
Status: DISCONTINUED | OUTPATIENT
Start: 2024-05-27 | End: 2024-05-30 | Stop reason: HOSPADM

## 2024-05-27 RX ORDER — POTASSIUM CHLORIDE 7.45 MG/ML
10 INJECTION INTRAVENOUS PRN
Status: DISCONTINUED | OUTPATIENT
Start: 2024-05-27 | End: 2024-05-30 | Stop reason: HOSPADM

## 2024-05-27 RX ADMIN — POTASSIUM CHLORIDE 10 MEQ: 7.46 INJECTION, SOLUTION INTRAVENOUS at 13:20

## 2024-05-27 RX ADMIN — POTASSIUM CHLORIDE 10 MEQ: 7.46 INJECTION, SOLUTION INTRAVENOUS at 14:28

## 2024-05-27 RX ADMIN — WATER 1000 MG: 1 INJECTION INTRAMUSCULAR; INTRAVENOUS; SUBCUTANEOUS at 06:57

## 2024-05-27 RX ADMIN — SODIUM CHLORIDE, PRESERVATIVE FREE 40 MG: 5 INJECTION INTRAVENOUS at 09:07

## 2024-05-27 RX ADMIN — SODIUM CHLORIDE, PRESERVATIVE FREE 40 MG: 5 INJECTION INTRAVENOUS at 20:08

## 2024-05-27 RX ADMIN — NYSTATIN 500000 UNITS: 100000 SUSPENSION ORAL at 09:08

## 2024-05-27 RX ADMIN — BENZOCAINE, BUTAMBEN, AND TETRACAINE HYDROCHLORIDE 1 SPRAY: .028; .004; .004 AEROSOL, SPRAY TOPICAL at 09:25

## 2024-05-27 RX ADMIN — POTASSIUM CHLORIDE 10 MEQ: 7.46 INJECTION, SOLUTION INTRAVENOUS at 10:42

## 2024-05-27 RX ADMIN — NYSTATIN 500000 UNITS: 100000 SUSPENSION ORAL at 20:08

## 2024-05-27 RX ADMIN — POTASSIUM CHLORIDE 10 MEQ: 7.46 INJECTION, SOLUTION INTRAVENOUS at 16:51

## 2024-05-27 RX ADMIN — POTASSIUM CHLORIDE 10 MEQ: 7.46 INJECTION, SOLUTION INTRAVENOUS at 15:41

## 2024-05-27 RX ADMIN — ENOXAPARIN SODIUM 40 MG: 100 INJECTION SUBCUTANEOUS at 14:29

## 2024-05-27 RX ADMIN — SODIUM CHLORIDE: 9 INJECTION, SOLUTION INTRAVENOUS at 07:01

## 2024-05-27 RX ADMIN — POTASSIUM CHLORIDE 10 MEQ: 7.46 INJECTION, SOLUTION INTRAVENOUS at 09:20

## 2024-05-27 NOTE — CONSULTS
CHIEF COMPLAINT: Pre-procedural clearance.    HISTORY OF PRESENT ILLNESS: Patient is a 84 y.o. female seen at the request of Unruly Smith MD and not followed by cardiology.    Presented with painful swallowing. Recommended for EGD.     Cardiology consulted for pre-procedural risk stratification.     No overt angina or SOB.     Limited mobility due to prior CVA.     Past Medical History:   Diagnosis Date    Carpal tunnel syndrome, bilateral     L hand OR 7-2-19     DDD (degenerative disc disease), cervical     Elevated hemoglobin (HCC) 8/6/2018    Lumbar radiculopathy     Osteoarthritis        Patient Active Problem List   Diagnosis    Bilateral carpal tunnel syndrome    Elevated hemoglobin (HCC)    Right carpal tunnel syndrome    DDD (degenerative disc disease), lumbar    Spondylolisthesis, lumbar region    Spinal stenosis of lumbar region    Lumbosacral spondylosis without myelopathy    Lumbar radiculopathy    Odynophagia       Allergies   Allergen Reactions    Hydrocodone-Acetaminophen Itching and Nausea Only       Current Facility-Administered Medications   Medication Dose Route Frequency Provider Last Rate Last Admin    nystatin (MYCOSTATIN) 496372 UNIT/ML suspension 500,000 Units  5 mL Oral 4x Daily Naresh Moreno DO   500,000 Units at 05/27/24 0908    cefTRIAXone (ROCEPHIN) 1,000 mg in sterile water 10 mL IV syringe  1,000 mg IntraVENous Q24H Naresh Moreno DO   1,000 mg at 05/27/24 0657    butamben-tetracaine-benzocaine (CETACAINE) spray 1 spray  1 spray Topical Once Naresh Moreno DO        Benzocaine-Menthol (CEPACOL) 1 lozenge  1 lozenge Oral Q2H PRN Naresh Moreno DO        magnesium sulfate 2000 mg in 50 mL IVPB premix  2,000 mg IntraVENous PRN Naresh Moreno DO        sodium phosphate 10.17 mmol in sodium chloride 0.9 % 250 mL IVPB  0.16 mmol/kg IntraVENous PRN Naresh Moreno DO        Or    sodium phosphate 20.31 mmol in sodium chloride 0.9 % 250 mL IVPB  0.32 mmol/kg IntraVENous PRN Naresh Moreno DO    adenopathy.  Neurological: Alert and oriented to person, place, and time.   Skin: Skin is warm and dry. No rash noted. Not diaphoretic. No erythema.   Psychiatric: Normal mood and affect. Behavior is normal.     CBC:   Lab Results   Component Value Date/Time    WBC 5.0 05/27/2024 05:17 AM    RBC 4.73 05/27/2024 05:17 AM    HGB 14.4 05/27/2024 05:17 AM    HCT 44.7 05/27/2024 05:17 AM    MCV 94.5 05/27/2024 05:17 AM    MCH 30.4 05/27/2024 05:17 AM    MCHC 32.2 05/27/2024 05:17 AM    RDW 13.1 05/27/2024 05:17 AM     05/27/2024 05:17 AM    MPV 9.3 05/27/2024 05:17 AM     BMP:   Lab Results   Component Value Date/Time     05/27/2024 05:17 AM    K 3.0 05/27/2024 05:17 AM     05/27/2024 05:17 AM    CO2 25 05/27/2024 05:17 AM    BUN 12 05/27/2024 05:17 AM    CREATININE 0.9 05/27/2024 05:17 AM    CALCIUM 8.6 05/27/2024 05:17 AM    GFRAA >60 10/15/2021 09:50 AM    LABGLOM 64 05/27/2024 05:17 AM     Magnesium:    Lab Results   Component Value Date/Time    MG 2.3 05/27/2024 05:17 AM     Cardiac Enzymes:   Lab Results   Component Value Date    CKTOTAL 35 05/26/2024    TROPHS 24 (H) 05/27/2024    TROPHS 22 (H) 05/26/2024    TROPHS 24 (H) 05/26/2024      PT/INR:  No results found for: \"PROTIME\", \"INR\"  TSH:    Lab Results   Component Value Date/Time    TSH 3.01 05/27/2024 05:17 AM     EKG: Normal sinus rhythm, Poor R wave progressionm ST & T wave changes in anterolateral and inferior.     ASSESSMENT AND PLAN:  Patient Active Problem List   Diagnosis    Bilateral carpal tunnel syndrome    Elevated hemoglobin (HCC)    Right carpal tunnel syndrome    DDD (degenerative disc disease), lumbar    Spondylolisthesis, lumbar region    Spinal stenosis of lumbar region    Lumbosacral spondylosis without myelopathy    Lumbar radiculopathy    Odynophagia     1. Pre-op Risk Stratification:    Chart/labs/EKG/monitor reviewed.     Lower risk procedure planned.     Low functional status, but no cardiac symptoms.    Patient is an

## 2024-05-27 NOTE — PLAN OF CARE
Problem: Discharge Planning  Goal: Discharge to home or other facility with appropriate resources  5/27/2024 1956 by Robb Townsend, RN  Outcome: Progressing     Problem: Pain  Goal: Verbalizes/displays adequate comfort level or baseline comfort level  5/27/2024 1956 by Robb Townsend, RN  Outcome: Progressing     Problem: Skin/Tissue Integrity  Goal: Absence of new skin breakdown  Description: 1.  Monitor for areas of redness and/or skin breakdown  2.  Assess vascular access sites hourly  3.  Every 4-6 hours minimum:  Change oxygen saturation probe site  4.  Every 4-6 hours:  If on nasal continuous positive airway pressure, respiratory therapy assess nares and determine need for appliance change or resting period.  5/27/2024 1956 by Robb Townsend, RN  Outcome: Progressing     Problem: Safety - Adult  Goal: Free from fall injury  5/27/2024 1956 by Robb Townsend, RN  Outcome: Progressing

## 2024-05-27 NOTE — CONSULTS
reasonably achievable. COMPARISON: None. HISTORY: ORDERING SYSTEM PROVIDED HISTORY: ha TECHNOLOGIST PROVIDED HISTORY: Has a \"code stroke\" or \"stroke alert\" been called?->No Reason for exam:->ha Decision Support Exception - unselect if not a suspected or confirmed emergency medical condition->Emergency Medical Condition (MA) FINDINGS: BRAIN/VENTRICLES: There is no acute intracranial hemorrhage, mass effect or midline shift.  There is age-appropriate cerebral atrophy with evidence of chronic periventricular small vessel ischemic disease.  No abnormal extra-axial fluid collection.  The gray-white differentiation is maintained without evidence of an acute infarct.  There is no evidence of hydrocephalus. ORBITS: The visualized portion of the orbits demonstrate no acute abnormality. SINUSES: The visualized paranasal sinuses and mastoid air cells demonstrate no acute abnormality. SOFT TISSUES/SKULL:  No acute abnormality of the visualized skull or soft tissues.     No acute intracranial abnormality. Senescent changes as described above.     XR CHEST PORTABLE    Result Date: 5/26/2024  EXAMINATION: ONE XRAY VIEW OF THE CHEST 5/26/2024 9:52 am COMPARISON: None. HISTORY: ORDERING SYSTEM PROVIDED HISTORY: chest pain TECHNOLOGIST PROVIDED HISTORY: Reason for exam:->chest pain FINDINGS: The lungs are without acute focal process.  There is no effusion or pneumothorax. The cardiomediastinal silhouette is without acute process. The osseous structures are without acute process.     No acute process.         Assessment and Plan  Patient is a 84 y.o. female patient on consult for dysphagia.    Dysphagia / odynophagia  -Fairly rapid onset 2 days ago per patient description  -CT chest 5/26/2024 showing mild circumferential wall thickening of the proximal esophagus  -PPI BID  -Plan for EGD tomorrow if cleared from cardiac standpoint    2.  Chest pain / elevated troponin  -Patient will require clearance from cardiology prior to endoscopic

## 2024-05-27 NOTE — PLAN OF CARE
Problem: Discharge Planning  Goal: Discharge to home or other facility with appropriate resources  5/27/2024 1340 by Lillian Hansen RN  Outcome: Progressing  5/27/2024 0431 by Meena Honeycutt RN  Outcome: Progressing     Problem: Pain  Goal: Verbalizes/displays adequate comfort level or baseline comfort level  5/27/2024 1340 by Lillian Hansen RN  Outcome: Progressing  5/27/2024 0431 by Meena Honeycutt RN  Outcome: Progressing     Problem: Skin/Tissue Integrity  Goal: Absence of new skin breakdown  Description: 1.  Monitor for areas of redness and/or skin breakdown  2.  Assess vascular access sites hourly  3.  Every 4-6 hours minimum:  Change oxygen saturation probe site  4.  Every 4-6 hours:  If on nasal continuous positive airway pressure, respiratory therapy assess nares and determine need for appliance change or resting period.  5/27/2024 1340 by Lillian Hansen RN  Outcome: Progressing  5/27/2024 0431 by Meena Honeycutt RN  Outcome: Progressing     Problem: Safety - Adult  Goal: Free from fall injury  5/27/2024 1340 by Lillian Hansen RN  Outcome: Progressing  5/27/2024 0431 by Meena Honeycutt RN  Outcome: Progressing

## 2024-05-27 NOTE — PROGRESS NOTES
Internal Medicine Progress Note     [facility]     Emmanuel Wong D.O., DION Moreno D.O., DION Ellis D.O.     Humaira Jerry, MSN, APRN, NP-C  Tom Ma, MSN, APRN-CNP  Timo Light, MSN, APRN, NP-C  Mary Jo Biswas, MSN, APRN-CNP  Janna Jimenez, MSN, APRN, NP-C     Primary Care Physician: Unruly Smith MD   Admitting Physician:  Emmanuel Wong DO  Admission date and time: 5/26/2024  8:49 AM    Room:  [address] [place], [place] [address]  Admitting diagnosis: Esophagitis [K20.90]  Abnormal EKG [R94.31]  Odynophagia [R13.10]  Nursing home resident [Z59.3]    Patient Name: Erin Reyna  MRN: [id number]    Date of Service: 5/27/2024     Subjective:  Erin is a 84 y.o. female who was seen and examined today,5/27/2024, at the bedside.  Erin continues to describe intractable throat pain and pain with swallowing.  She feels that her food gets stuck shelter down and therefore has been unable to eat effectively for the past several days.  Examination reveals white plaques in the posterior pharynx.  She denies any fever or chills.  No family members are present during my examination.    Review of System:   Constitutional:   Admits to generalized malaise and fatigue.  HEENT:   Describes sore throat as listed above.  Cardiovascular:   Denies any chest pain, irregular heartbeats, or palpitations.   Respiratory:   Denies shortness of breath, coughing, sputum production, hemoptysis, or wheezing.  Gastrointestinal:   Pain with swallowing.  Difficulty with both solids and liquids.  Genitourinary:    Denies any urgency, frequency, hematuria. Voiding  without difficulty.  Extremities:   Denies lower extremity swelling, edema or cyanosis.   Neurology:    Denies any headache or focal neurological deficits, describes generalized weakness and deconditioning.  Psch:   Denies being anxious or depressed.  Musculoskeletal:    Denies  myalgias, joint complaints or  Topical Once    enoxaparin  40 mg SubCUTAneous Daily    pantoprazole (PROTONIX) 40 mg in sodium chloride (PF) 0.9 % 10 mL injection  40 mg IntraVENous Q12H     Continuous Infusions:   sodium chloride 75 mL/hr at 05/26/24 1437       Objective Data:  CBC with Differential:    Lab Results   Component Value Date/Time    WBC 6.6 05/26/2024 09:55 AM    RBC 5.05 05/26/2024 09:55 AM    HGB 15.8 05/26/2024 09:55 AM    HCT 48.3 05/26/2024 09:55 AM     05/26/2024 09:55 AM    MCV 95.6 05/26/2024 09:55 AM    MCH 31.3 05/26/2024 09:55 AM    MCHC 32.7 05/26/2024 09:55 AM    RDW 13.1 05/26/2024 09:55 AM    LYMPHOPCT 19 05/26/2024 09:55 AM    MONOPCT 9 05/26/2024 09:55 AM    EOSPCT 2 05/26/2024 09:55 AM    BASOPCT 1 05/26/2024 09:55 AM    MONOSABS 0.57 05/26/2024 09:55 AM    EOSABS 0.14 05/26/2024 09:55 AM    BASOSABS 0.03 05/26/2024 09:55 AM     BMP:    Lab Results   Component Value Date/Time     05/26/2024 09:55 AM    K 4.0 05/26/2024 09:55 AM     05/26/2024 09:55 AM    CO2 32 05/26/2024 09:55 AM    BUN 16 05/26/2024 09:55 AM    CREATININE 1.1 05/26/2024 09:55 AM    CALCIUM 9.7 05/26/2024 09:55 AM    GFRAA >60 10/15/2021 09:50 AM    LABGLOM 47 05/26/2024 09:55 AM    GLUCOSE 92 05/26/2024 09:55 AM       Assessment:  Odynophagia with radiographic evidence of esophagitis  Severe protein calorie malnutrition with dehydration in the setting of minimal oral intake  Previous history of CVA    Plan:   IV Protonix is being utilized and a clear liquid diet is being employed.  White plaques are noticeable in the posterior pharynx we will culture and treat for strep.  Troponins are mildly elevated and cardiac clearance will be necessary for endoscopic evaluation.  We are awaiting GI evaluation and I am recommending inpatient EGD.  Continue current therapy.  See orders for further plan of care.    More than 50% of my time was spent at the bedside counseling/coordinating care with the patient and/or family with face to face

## 2024-05-28 ENCOUNTER — ANESTHESIA (OUTPATIENT)
Dept: ENDOSCOPY | Age: 84
DRG: 392 | End: 2024-05-28
Payer: COMMERCIAL

## 2024-05-28 ENCOUNTER — ANESTHESIA EVENT (OUTPATIENT)
Dept: ENDOSCOPY | Age: 84
DRG: 392 | End: 2024-05-28
Payer: COMMERCIAL

## 2024-05-28 LAB
ALBUMIN SERPL-MCNC: 3.2 G/DL (ref 3.5–5.2)
ALP SERPL-CCNC: 87 U/L (ref 35–104)
ALT SERPL-CCNC: 9 U/L (ref 0–32)
ANION GAP SERPL CALCULATED.3IONS-SCNC: 15 MMOL/L (ref 7–16)
AST SERPL-CCNC: 16 U/L (ref 0–31)
BACTERIA URNS QL MICRO: ABNORMAL
BASOPHILS # BLD: 0.04 K/UL (ref 0–0.2)
BASOPHILS NFR BLD: 1 % (ref 0–2)
BILIRUB SERPL-MCNC: 0.5 MG/DL (ref 0–1.2)
BILIRUB UR QL STRIP: ABNORMAL
BUN SERPL-MCNC: 6 MG/DL (ref 6–23)
CALCIUM SERPL-MCNC: 8.6 MG/DL (ref 8.6–10.2)
CHLORIDE SERPL-SCNC: 106 MMOL/L (ref 98–107)
CLARITY UR: CLEAR
CO2 SERPL-SCNC: 21 MMOL/L (ref 22–29)
COLOR UR: YELLOW
CREAT SERPL-MCNC: 0.7 MG/DL (ref 0.5–1)
EKG ATRIAL RATE: 71 BPM
EKG P AXIS: 34 DEGREES
EKG P-R INTERVAL: 190 MS
EKG Q-T INTERVAL: 380 MS
EKG QRS DURATION: 72 MS
EKG QTC CALCULATION (BAZETT): 412 MS
EKG R AXIS: -10 DEGREES
EKG T AXIS: -106 DEGREES
EKG VENTRICULAR RATE: 71 BPM
EOSINOPHIL # BLD: 0.17 K/UL (ref 0.05–0.5)
EOSINOPHILS RELATIVE PERCENT: 3 % (ref 0–6)
ERYTHROCYTE [DISTWIDTH] IN BLOOD BY AUTOMATED COUNT: 13.2 % (ref 11.5–15)
GFR, ESTIMATED: 84 ML/MIN/1.73M2
GLUCOSE SERPL-MCNC: 79 MG/DL (ref 74–99)
GLUCOSE UR STRIP-MCNC: NEGATIVE MG/DL
HCT VFR BLD AUTO: 44.4 % (ref 34–48)
HGB BLD-MCNC: 14.6 G/DL (ref 11.5–15.5)
HGB UR QL STRIP.AUTO: NEGATIVE
IMM GRANULOCYTES # BLD AUTO: <0.03 K/UL (ref 0–0.58)
IMM GRANULOCYTES NFR BLD: 0 % (ref 0–5)
KETONES UR STRIP-MCNC: >80 MG/DL
LEUKOCYTE ESTERASE UR QL STRIP: NEGATIVE
LYMPHOCYTES NFR BLD: 1.1 K/UL (ref 1.5–4)
LYMPHOCYTES RELATIVE PERCENT: 20 % (ref 20–42)
MAGNESIUM SERPL-MCNC: 2.1 MG/DL (ref 1.6–2.6)
MCH RBC QN AUTO: 30.8 PG (ref 26–35)
MCHC RBC AUTO-ENTMCNC: 32.9 G/DL (ref 32–34.5)
MCV RBC AUTO: 93.7 FL (ref 80–99.9)
MONOCYTES NFR BLD: 0.52 K/UL (ref 0.1–0.95)
MONOCYTES NFR BLD: 10 % (ref 2–12)
NEUTROPHILS NFR BLD: 66 % (ref 43–80)
NEUTS SEG NFR BLD: 3.65 K/UL (ref 1.8–7.3)
NITRITE UR QL STRIP: NEGATIVE
PH UR STRIP: 5.5 [PH] (ref 5–9)
PHOSPHATE SERPL-MCNC: 2.2 MG/DL (ref 2.5–4.5)
PLATELET # BLD AUTO: 188 K/UL (ref 130–450)
PMV BLD AUTO: 8.9 FL (ref 7–12)
POTASSIUM SERPL-SCNC: 3.4 MMOL/L (ref 3.5–5)
PROT SERPL-MCNC: 6.2 G/DL (ref 6.4–8.3)
PROT UR STRIP-MCNC: NEGATIVE MG/DL
RBC # BLD AUTO: 4.74 M/UL (ref 3.5–5.5)
RBC #/AREA URNS HPF: ABNORMAL /HPF
SODIUM SERPL-SCNC: 142 MMOL/L (ref 132–146)
SP GR UR STRIP: 1.02 (ref 1–1.03)
SPECIMEN SOURCE: NORMAL
STREP A, MOLECULAR: NEGATIVE
UROBILINOGEN UR STRIP-ACNC: 0.2 EU/DL (ref 0–1)
WBC #/AREA URNS HPF: ABNORMAL /HPF
WBC OTHER # BLD: 5.5 K/UL (ref 4.5–11.5)

## 2024-05-28 PROCEDURE — 85025 COMPLETE CBC W/AUTO DIFF WBC: CPT

## 2024-05-28 PROCEDURE — 36415 COLL VENOUS BLD VENIPUNCTURE: CPT

## 2024-05-28 PROCEDURE — 2709999900 HC NON-CHARGEABLE SUPPLY: Performed by: INTERNAL MEDICINE

## 2024-05-28 PROCEDURE — 80053 COMPREHEN METABOLIC PANEL: CPT

## 2024-05-28 PROCEDURE — 3700000000 HC ANESTHESIA ATTENDED CARE: Performed by: INTERNAL MEDICINE

## 2024-05-28 PROCEDURE — 83735 ASSAY OF MAGNESIUM: CPT

## 2024-05-28 PROCEDURE — 2500000003 HC RX 250 WO HCPCS: Performed by: NURSE PRACTITIONER

## 2024-05-28 PROCEDURE — 7100000011 HC PHASE II RECOVERY - ADDTL 15 MIN: Performed by: INTERNAL MEDICINE

## 2024-05-28 PROCEDURE — 0DB68ZX EXCISION OF STOMACH, VIA NATURAL OR ARTIFICIAL OPENING ENDOSCOPIC, DIAGNOSTIC: ICD-10-PCS | Performed by: INTERNAL MEDICINE

## 2024-05-28 PROCEDURE — 88342 IMHCHEM/IMCYTCHM 1ST ANTB: CPT

## 2024-05-28 PROCEDURE — 2580000003 HC RX 258

## 2024-05-28 PROCEDURE — 6360000002 HC RX W HCPCS: Performed by: INTERNAL MEDICINE

## 2024-05-28 PROCEDURE — 99233 SBSQ HOSP IP/OBS HIGH 50: CPT | Performed by: INTERNAL MEDICINE

## 2024-05-28 PROCEDURE — 0DB18ZX EXCISION OF UPPER ESOPHAGUS, VIA NATURAL OR ARTIFICIAL OPENING ENDOSCOPIC, DIAGNOSTIC: ICD-10-PCS | Performed by: INTERNAL MEDICINE

## 2024-05-28 PROCEDURE — 6360000002 HC RX W HCPCS

## 2024-05-28 PROCEDURE — 81001 URINALYSIS AUTO W/SCOPE: CPT

## 2024-05-28 PROCEDURE — 84100 ASSAY OF PHOSPHORUS: CPT

## 2024-05-28 PROCEDURE — 88312 SPECIAL STAINS GROUP 1: CPT

## 2024-05-28 PROCEDURE — 2580000003 HC RX 258: Performed by: NURSE ANESTHETIST, CERTIFIED REGISTERED

## 2024-05-28 PROCEDURE — 88305 TISSUE EXAM BY PATHOLOGIST: CPT

## 2024-05-28 PROCEDURE — 6360000002 HC RX W HCPCS: Performed by: NURSE ANESTHETIST, CERTIFIED REGISTERED

## 2024-05-28 PROCEDURE — 6370000000 HC RX 637 (ALT 250 FOR IP): Performed by: INTERNAL MEDICINE

## 2024-05-28 PROCEDURE — 97161 PT EVAL LOW COMPLEX 20 MIN: CPT

## 2024-05-28 PROCEDURE — 3609017700 HC EGD DILATION GASTRIC/DUODENAL STRICTURE: Performed by: INTERNAL MEDICINE

## 2024-05-28 PROCEDURE — 97530 THERAPEUTIC ACTIVITIES: CPT

## 2024-05-28 PROCEDURE — 87651 STREP A DNA AMP PROBE: CPT

## 2024-05-28 PROCEDURE — 0D758ZZ DILATION OF ESOPHAGUS, VIA NATURAL OR ARTIFICIAL OPENING ENDOSCOPIC: ICD-10-PCS | Performed by: INTERNAL MEDICINE

## 2024-05-28 PROCEDURE — 1200000000 HC SEMI PRIVATE

## 2024-05-28 PROCEDURE — 97165 OT EVAL LOW COMPLEX 30 MIN: CPT

## 2024-05-28 PROCEDURE — C9113 INJ PANTOPRAZOLE SODIUM, VIA: HCPCS

## 2024-05-28 PROCEDURE — A4216 STERILE WATER/SALINE, 10 ML: HCPCS

## 2024-05-28 PROCEDURE — 7100000010 HC PHASE II RECOVERY - FIRST 15 MIN: Performed by: INTERNAL MEDICINE

## 2024-05-28 PROCEDURE — 0DB38ZX EXCISION OF LOWER ESOPHAGUS, VIA NATURAL OR ARTIFICIAL OPENING ENDOSCOPIC, DIAGNOSTIC: ICD-10-PCS | Performed by: INTERNAL MEDICINE

## 2024-05-28 PROCEDURE — 97535 SELF CARE MNGMENT TRAINING: CPT

## 2024-05-28 PROCEDURE — 2580000003 HC RX 258: Performed by: INTERNAL MEDICINE

## 2024-05-28 PROCEDURE — 3609012400 HC EGD TRANSORAL BIOPSY SINGLE/MULTIPLE: Performed by: INTERNAL MEDICINE

## 2024-05-28 RX ORDER — FLUCONAZOLE 2 MG/ML
200 INJECTION, SOLUTION INTRAVENOUS DAILY
Status: DISCONTINUED | OUTPATIENT
Start: 2024-05-29 | End: 2024-05-30 | Stop reason: HOSPADM

## 2024-05-28 RX ORDER — VITAMIN B COMPLEX
2000 TABLET ORAL DAILY
Status: DISCONTINUED | OUTPATIENT
Start: 2024-05-28 | End: 2024-05-30 | Stop reason: HOSPADM

## 2024-05-28 RX ORDER — PROPOFOL 10 MG/ML
INJECTION, EMULSION INTRAVENOUS PRN
Status: DISCONTINUED | OUTPATIENT
Start: 2024-05-28 | End: 2024-05-28 | Stop reason: SDUPTHER

## 2024-05-28 RX ORDER — FLUCONAZOLE 2 MG/ML
400 INJECTION, SOLUTION INTRAVENOUS ONCE
Status: COMPLETED | OUTPATIENT
Start: 2024-05-28 | End: 2024-05-28

## 2024-05-28 RX ORDER — DEXTROSE MONOHYDRATE, SODIUM CHLORIDE, AND POTASSIUM CHLORIDE 50; 1.49; 4.5 G/1000ML; G/1000ML; G/1000ML
INJECTION, SOLUTION INTRAVENOUS CONTINUOUS
Status: DISCONTINUED | OUTPATIENT
Start: 2024-05-28 | End: 2024-05-30 | Stop reason: HOSPADM

## 2024-05-28 RX ORDER — SODIUM CHLORIDE 9 MG/ML
INJECTION, SOLUTION INTRAVENOUS CONTINUOUS PRN
Status: DISCONTINUED | OUTPATIENT
Start: 2024-05-28 | End: 2024-05-28 | Stop reason: SDUPTHER

## 2024-05-28 RX ADMIN — FLUCONAZOLE IN SODIUM CHLORIDE 400 MG: 2 INJECTION, SOLUTION INTRAVENOUS at 08:11

## 2024-05-28 RX ADMIN — SODIUM CHLORIDE: 9 INJECTION, SOLUTION INTRAVENOUS at 15:49

## 2024-05-28 RX ADMIN — NYSTATIN 500000 UNITS: 100000 SUSPENSION ORAL at 08:10

## 2024-05-28 RX ADMIN — SODIUM CHLORIDE, PRESERVATIVE FREE 40 MG: 5 INJECTION INTRAVENOUS at 08:10

## 2024-05-28 RX ADMIN — NYSTATIN 500000 UNITS: 100000 SUSPENSION ORAL at 21:02

## 2024-05-28 RX ADMIN — PROPOFOL 80 MG: 10 INJECTION, EMULSION INTRAVENOUS at 16:01

## 2024-05-28 RX ADMIN — Medication 2000 UNITS: at 08:10

## 2024-05-28 RX ADMIN — ENOXAPARIN SODIUM 40 MG: 100 INJECTION SUBCUTANEOUS at 18:58

## 2024-05-28 RX ADMIN — NYSTATIN 500000 UNITS: 100000 SUSPENSION ORAL at 18:58

## 2024-05-28 RX ADMIN — NYSTATIN 500000 UNITS: 100000 SUSPENSION ORAL at 13:07

## 2024-05-28 RX ADMIN — WATER 1000 MG: 1 INJECTION INTRAMUSCULAR; INTRAVENOUS; SUBCUTANEOUS at 05:37

## 2024-05-28 RX ADMIN — SODIUM CHLORIDE, PRESERVATIVE FREE 40 MG: 5 INJECTION INTRAVENOUS at 21:02

## 2024-05-28 RX ADMIN — DEXTROSE MONOHYDRATE, SODIUM CHLORIDE, AND POTASSIUM CHLORIDE: 50; 4.5; 1.49 INJECTION, SOLUTION INTRAVENOUS at 08:13

## 2024-05-28 RX ADMIN — PROPOFOL 40 MG: 10 INJECTION, EMULSION INTRAVENOUS at 16:03

## 2024-05-28 ASSESSMENT — LIFESTYLE VARIABLES: SMOKING_STATUS: 1

## 2024-05-28 NOTE — OP NOTE
Operative Note      Patient: Erin Reyna  YOB: 1940  MRN: [id number]    Date of Procedure: 5/28/2024    Pre-Op Diagnosis Codes:     * Dysphagia, unspecified type [R13.10], Abnormal CT Proximal Esophagus    Post-Op Diagnosis: SAME       Procedure(s):  ESOPHAGOGASTRODUODENOSCOPY BIOPSY  ESOPHAGOGASTRODUODENOSCOPY DILATATION    Surgeon(s):  Myron Mart DO    Assistant:   Surgical Assistant: Iris Vela RN    Anesthesia: Monitor Anesthesia Care    Estimated Blood Loss (mL): < 5cc    Complications: None    Specimens:   ID Type Source Tests Collected by Time Destination   A : BX ANTRUM R/O H PYLORI Gastric Gastric SURGICAL PATHOLOGY Myron Mart,  5/28/2024 1603    B : bx proximal esoph r/o EOE Tissue Esophagus SURGICAL PATHOLOGY Myron Mart,  5/28/2024 1604    C : bx distal esoph r/o EOE Tissue Esophagus SURGICAL PATHOLOGY Myron Mart,  5/28/2024 1604        Implants:  * No implants in log *      Drains:   External Urinary Catheter (Active)   Output (mL) 500 mL 05/28/24 1411     Detailed Description of Procedure:   Procedure:  Esophagogastroduodenoscopy    Indication:  Dysphagia, Abnormal CT of Proximal Esophagus    Consent: Informed consent was obtained from the patient including and not limited to risk of perforation, aspiration of gastric contents or teeth, bleeding, infection, dental breakage, ileus, need for surgery, or worst case death.    Sedation  MAC    Estimated Blood Loss -- < 5cc    Endoscope was advanced easily through mouth to second portion of duodenum      Oropharynx views are limited but grossly normal.    Esophagus:   Mucosa is normal other than LA A Reflux Esophagitis with biopsy proximal and distal.  GEJ at ~38 cm.  At the end of the procedure I did dilate with a 52 F Hughes dilator with good dilation effect and no complications.      Stomach:   Antrum and Gastric body with moderate gastritis with biopsy.    Retroflexed views showed normal fundus and

## 2024-05-28 NOTE — PROGRESS NOTES
CHIEF COMPLAINT: Pre-procedural clearance.    HISTORY OF PRESENT ILLNESS: Patient is a 84 y.o. female seen at the request of Unruly Smith MD and not followed by cardiology.    Presented with painful swallowing. Recommended for EGD.     Cardiology consulted for pre-procedural risk stratification.     No overt angina or SOB.     Limited mobility due to prior CVA.     Past Medical History:   Diagnosis Date    Carpal tunnel syndrome, bilateral     L hand OR 7-2-19     DDD (degenerative disc disease), cervical     Elevated hemoglobin (HCC) 8/6/2018    Lumbar radiculopathy     Osteoarthritis        Patient Active Problem List   Diagnosis    Bilateral carpal tunnel syndrome    Elevated hemoglobin (HCC)    Right carpal tunnel syndrome    DDD (degenerative disc disease), lumbar    Spondylolisthesis, lumbar region    Spinal stenosis of lumbar region    Lumbosacral spondylosis without myelopathy    Lumbar radiculopathy    Odynophagia    Esophagitis       Allergies   Allergen Reactions    Hydrocodone-Acetaminophen Itching and Nausea Only       Current Facility-Administered Medications   Medication Dose Route Frequency Provider Last Rate Last Admin    dextrose 5 % and 0.45 % NaCl with KCl 20 mEq infusion   IntraVENous Continuous Tom Ma APRN -  mL/hr at 05/28/24 0813 New Bag at 05/28/24 0813    [START ON 5/29/2024] fluconazole (DIFLUCAN) in 0.9 % sodium chloride IVPB 200 mg  200 mg IntraVENous Daily Naresh Moreno DO        Vitamin D (CHOLECALCIFEROL) tablet 2,000 Units  2,000 Units Oral Daily Naresh Moreno DO   2,000 Units at 05/28/24 0810    nystatin (MYCOSTATIN) 725873 UNIT/ML suspension 500,000 Units  5 mL Oral 4x Daily Naresh Moreno DO   500,000 Units at 05/28/24 0810    cefTRIAXone (ROCEPHIN) 1,000 mg in sterile water 10 mL IV syringe  1,000 mg IntraVENous Q24H Naresh Moreno DO   1,000 mg at 05/28/24 0537    Benzocaine-Menthol (CEPACOL) 1 lozenge  1 lozenge Oral Q2H PRN Naresh Moreno DO        magnesium  (5/26/2024)    Hunger Vital Sign     Worried About Running Out of Food in the Last Year: Never true     Ran Out of Food in the Last Year: Never true   Transportation Needs: No Transportation Needs (5/26/2024)    PRAPARE - Transportation     Lack of Transportation (Medical): No     Lack of Transportation (Non-Medical): No   Physical Activity: Not on file   Stress: Not on file   Social Connections: Not on file   Intimate Partner Violence: Not on file   Housing Stability: Low Risk  (5/26/2024)    Housing Stability Vital Sign     Unable to Pay for Housing in the Last Year: No     Number of Places Lived in the Last Year: 1     Unstable Housing in the Last Year: No       Family History   Problem Relation Age of Onset    Cancer Father        Review of Systems:   Heart: as above   Lungs: as above   Eyes: denies changes in vision or discharge.   Ears: denies changes in hearing or pain.   Nose: denies epistaxis or masses   Throat: denies sore throat or trouble swallowing.   Neuro: denies numbness, tingling, tremors.   Skin: denies rashes or itching.   : denies hematuria, dysuria   GI: denies vomiting, diarrhea   Psych: denies mood changed, anxiety, depression.  all others negative.    Physical Exam   /84   Pulse 88   Temp 98.3 °F (36.8 °C) (Oral)   Resp 16   Ht 1.626 m (5' 4\")   Wt 63.5 kg (140 lb)   SpO2 95%   BMI 24.03 kg/m²   Constitutional: Oriented to person, place, and time. Well-developed and well-nourished. No distress.    Head: Normocephalic and atraumatic.   Eyes: EOM are normal. Pupils are equal, round, and reactive to light.   Neck: Normal range of motion. Neck supple. No hepatojugular reflux and no JVD present. Carotid bruit is not present. No tracheal deviation present. No thyromegaly present.   Cardiovascular: Normal rate, regular rhythm, normal heart sounds and intact distal pulses.  Exam reveals no gallop and no friction rub.  No murmur heard.  Pulmonary/Chest: Effort normal and breath sounds

## 2024-05-28 NOTE — DISCHARGE INSTR - COC
Continuity of Care Form    Patient Name: Erin Reyna   :  1940  MRN:  [id number]    Admit date:  2024  Discharge date:  2024    Code Status Order: DNR-CC   Advance Directives:     Admitting Physician:  Emmanuel Wong DO  PCP: Unruly Smith MD    Discharging Nurse: Torri  Discharging Hospital Unit/Room#: 0330/0330-02  Discharging Unit Phone Number: [phone]    Emergency Contact:   Extended Emergency Contact Information  Primary Emergency Contact: Kathy Armijo   [facility]  Home Phone: [phone]  Mobile Phone: [phone]  Relation: Child  Secondary Emergency Contact: Froilan Armijo  Home Phone: [phone]  Mobile Phone: [phone]  Relation: Child    Past Surgical History:  Past Surgical History:   Procedure Laterality Date    ANESTHESIA NERVE BLOCK Bilateral 12/10/2019    RIGHT L5 AND LEFT L4 TRANSFORAMINAL LUMBAR EPIDURAL UNDER FLUOROSCOPY (CPT 90526) performed by Modesto Pearson MD at [facility] OR    APPENDECTOMY      CARPAL TUNNEL RELEASE Right 2019    RIGHT ENDOSCOPIC CARPAL TUNNEL RELEASE performed by Santana Alvarado MD at [facility] OR    CARPAL TUNNEL RELEASE Left 2019    LEFT ENDOSCOPIC CARPAL TUNNEL RELEASE performed by Santana Alvarado MD at [facility] OR    CATARACT EXTRACTION W/  INTRAOCULAR LENS IMPLANT Right 13    CATARACT EXTRACTION W/  INTRAOCULAR LENS IMPLANT Left 13    CHOLECYSTECTOMY      open    EPIDURAL STEROID INJECTION N/A 2019    LUMBAR EPIDURAL STEROID INJECTION L4-5 UNDER FLUOROSCOPIC GUIDANCE performed by Modesto Pearson MD at [facility] OR    EPIDURAL STEROID INJECTION Bilateral 9/3/2019    ATTAMPTED BILATERAL LUMBAR TRANSFORAMINAL EPIDURAL STEROID INJECTION LEFT L4 AND RIGHT  L5 UNDER FLUOROSCOPIC GUIDANCE performed by Modesto Pearson MD at [facility] OR    HYSTERECTOMY  's    NERVE BLOCK N/A 2019    lumbar epidural L4-5    NERVE BLOCK Bilateral 2019    Left L4, Right L5    NERVE    Incision 09/03/19 Back (Active)   Number of days: 1728        Elimination:  Continence:   Bowel: Yes  Bladder: No  Urinary Catheter: None   Colostomy/Ileostomy/Ileal Conduit: No       Date of Last BM: 5/29/2024    Intake/Output Summary (Last 24 hours) at 5/28/2024 1003  Last data filed at 5/28/2024 0615  Gross per 24 hour   Intake 789.21 ml   Output 850 ml   Net -60.79 ml     I/O last 3 completed shifts:  In: 909.2 [P.O.:120; I.V.:789.2]  Out: 850 [Urine:850]    Safety Concerns:     At Risk for Falls    Impairments/Disabilities:      None    Nutrition Therapy:  Current Nutrition Therapy:   - Oral Diet:  Dysphagia - Soft and Bite Sized    Routes of Feeding: Oral  Liquids: Thin Liquids  Daily Fluid Restriction: no  Last Modified Barium Swallow with Video (Video Swallowing Test): not done    Rehab Therapies: Physical Therapy and Occupational Therapy  Weight Bearing Status/Restrictions: No weight bearing restrictions  Other Medical Equipment (for information only, NOT a DME order):  walker  Other Treatments:     Patient's personal belongings (please select all that are sent with patient):  None    RN SIGNATURE:  Electronically signed by Eugenia Sanders RN on 5/30/24 at 11:39 AM EDT    CASE MANAGEMENT/SOCIAL WORK SECTION    Inpatient Status Date: 5/26/2024    Readmission Risk Assessment Score:  Readmission Risk              Risk of Unplanned Readmission:  7           Discharging to Facility/ Agency   Name: [facility]  Address:  Phone: [phone]  Fax:    Dialysis Facility (if applicable)   Name:  Address:  Dialysis Schedule:  Phone:  Fax:    / signature: Electronically signed by PHILIP Kilgore on 5/28/24 at 10:03 AM EDT    PHYSICIAN SECTION    Prognosis: Good    Condition at Discharge: Stable    Rehab Potential (if transferring to Rehab): Good    Recommended Labs or Other Treatments After Discharge: ***    Physician Certification: I certify the above information and transfer of

## 2024-05-28 NOTE — ANESTHESIA PRE PROCEDURE
Department of Anesthesiology  Preprocedure Note       Name:  Erin Reyna   Age:  84 y.o.  :  1940                                          MRN:  [id number]         Date:  2024      Surgeon: Surgeon(s):  Myron Mart DO    Procedure: ESOPHAGOGASTRODUODENOSCOPY    Medications prior to admission:   Prior to Admission medications    Medication Sig Start Date End Date Taking? Authorizing Provider   gabapentin (NEURONTIN) 600 MG tablet Take 1 tablet by mouth 3 times daily for 180 days. 21  Heide Rodriguez APRN - NP   folic acid (FOLVITE) 1 MG tablet Take 2 tablets by mouth daily 9/4/20 10/4/20  Heide Rodriguez APRN - NP   Naproxen Sodium (ALEVE) 220 MG CAPS Take 1 capsule by mouth as needed for Pain  Patient not taking: Reported on 2021    Provider, MD Servando   diclofenac sodium 1 % GEL Apply 2 g topically 4 times daily  Patient not taking: Reported on 2020   Santana Alvarado MD       Current medications:    Current Facility-Administered Medications   Medication Dose Route Frequency Provider Last Rate Last Admin    dextrose 5 % and 0.45 % NaCl with KCl 20 mEq infusion   IntraVENous Continuous Tom Ma APRN -  mL/hr at 24 0813 New Bag at 24 0813    [START ON 2024] fluconazole (DIFLUCAN) in 0.9 % sodium chloride IVPB 200 mg  200 mg IntraVENous Daily Naresh Moreno DO        Vitamin D (CHOLECALCIFEROL) tablet 2,000 Units  2,000 Units Oral Daily Naresh Moreno DO   2,000 Units at 24 0810    nystatin (MYCOSTATIN) 108984 UNIT/ML suspension 500,000 Units  5 mL Oral 4x Daily Naresh Moreno DO   500,000 Units at 24 1307    cefTRIAXone (ROCEPHIN) 1,000 mg in sterile water 10 mL IV syringe  1,000 mg IntraVENous Q24H Naresh Moreno DO   1,000 mg at 24 0537    Benzocaine-Menthol (CEPACOL) 1 lozenge  1 lozenge Oral Q2H PRN Naresh Moreno DO        magnesium sulfate 2000 mg in 50 mL IVPB premix  2,000 mg IntraVENous PRN Black,

## 2024-05-28 NOTE — PROGRESS NOTES
Immediately prior to the procedure the patient's History and Physical was reviewed- there are no changes with the current vitals.  /84   Pulse 88   Temp 98.3 °F (36.8 °C) (Oral)   Resp 16   Ht 1.626 m (5' 4\")   Wt 63.5 kg (140 lb)   SpO2 95%   BMI 24.03 kg/m²     No CP/SOB.  Risks/benefits d/w pt.  All questions answered.  Proceed with EGD and possible dilation.  Pt / family know DNR status suspended for 24 hours with procedure.    DEMETRIUS DALY,   5/28/2024  3:59 PM

## 2024-05-28 NOTE — PROGRESS NOTES
Physical Therapy Initial Evaluation/Plan of Care    Room #:  0330/0330-02  Patient Name: Erin Reyna  YOB: 1940  MRN: [id number]    Date of Service: 5/28/2024     Tentative placement recommendation: Subacute Rehab  Equipment recommendation: To be determined      Evaluating Physical Therapist: Jimy Sommers, PT #[id number]      Specific Provider Orders/Date/Referring Provider :   05/26/24 1715    PT eval and treat  Start:  05/26/24 1715,   End:  05/26/24 1715,   ONE TIME,   Standing Count:  1 Occurrences,   R       Emmanuel Wong DO    Admitting Diagnosis:   Esophagitis [K20.90]  Abnormal EKG [R94.31]  Odynophagia [R13.10]  Nursing home resident [Z59.3]      Surgery: none  Visit Diagnoses         Codes    Abnormal EKG     R94.31    Nursing home resident     Z59.3    Acute coronary syndrome (HCC)     I24.9            Patient Active Problem List   Diagnosis    Bilateral carpal tunnel syndrome    Elevated hemoglobin (HCC)    Right carpal tunnel syndrome    DDD (degenerative disc disease), lumbar    Spondylolisthesis, lumbar region    Spinal stenosis of lumbar region    Lumbosacral spondylosis without myelopathy    Lumbar radiculopathy    Odynophagia    Esophagitis        ASSESSMENT of Current Deficits Patient exhibits decreased strength, balance, and endurance impairing functional mobility, transfers, gait distance, and tolerance to activity. Pt stood and took steps to chair, mod x2 for safety and cueing.  The patient will benefit from continued skilled therapy to increase strength and improve balance for safe functional mobility, to decrease risk of falls, and to meet goals at discharge.         PHYSICAL THERAPY  PLAN OF CARE       Physical therapy plan of care is established based on physician order,  patient diagnosis and clinical assessment    Current Treatment Recommendations:    -Bed Mobility: Lower and upper extremity exercises, and trunk control activities  -Sitting Balance: Incorporate  above have been reviewed prior to the initiation of this evaluation.    OBJECTIVE:   Initial Evaluation  Date: 5/28/2024 Treatment Date:     Short Term/ Long Term   Goals   Was pt agreeable to Eval/treatment? Yes  To be met in 4 days   Pain level   0/10       Bed Mobility  Using rails and head of bed elevated:     Rolling: Moderate assist of 1    Supine to sit: Moderate assist of 1    Sit to supine: Not assessed patient in chair    Scooting: Moderate assist of 1    Rolling: Independent    Supine to sit: Independent    Sit to supine: Independent    Scooting: Independent     Transfers Sit to stand: Moderate assist of  2 cueing for hand placement and foot placement, safety   Sit to stand: Independent    Ambulation     3 feet using  wheeled walker with Moderate assist of  2   for walker control, balance, upright, and safety and Patient with shuffling steps, flexed posture, decreased renato, decreased base of support, and decreased step length    15 feet using  wheeled walker with Supervision     Stair negotiation: ascended and descended   Not assessed        ROM Within functional limits    Increase range of motion 10% of affected joints    Strength BUE:  refer to OT eval  RLE:  3+/5  LLE:  3+/5  Increase strength in affected mm groups by 1/3 grade   Balance Sitting EOB:  fair   Dynamic Standing:  poor wheeled walker    Sitting EOB:  good   Dynamic Standing: fair + wheeled walker       Patient is Alert & Oriented x person and place and follows directions    Sensation:  Patient  reports numbness bilateral hands, bilateral feet     Edema:  no   Endurance: fair      Vitals: room air   Blood Pressure at rest  Blood Pressure during session    Heart Rate at rest  Heart Rate during session    SPO2 at rest %  SPO2 during session %     Patient education  Patient educated on role of Physical Therapy, risks of immobility, safety and plan of care,  importance of mobility while in hospital , ankle pumps, quad set and glut set for

## 2024-05-28 NOTE — PROGRESS NOTES
pantoprazole (PROTONIX) 40 mg in sodium chloride (PF) 0.9 % 10 mL injection  40 mg IntraVENous Q12H     Continuous Infusions:   dextrose 5% and 0.45% NaCl with KCl 20 mEq      sodium chloride 75 mL/hr at 05/28/24 0615       Objective Data:  CBC with Differential:    Lab Results   Component Value Date/Time    WBC 5.5 05/28/2024 05:26 AM    RBC 4.74 05/28/2024 05:26 AM    HGB 14.6 05/28/2024 05:26 AM    HCT 44.4 05/28/2024 05:26 AM     05/28/2024 05:26 AM    MCV 93.7 05/28/2024 05:26 AM    MCH 30.8 05/28/2024 05:26 AM    MCHC 32.9 05/28/2024 05:26 AM    RDW 13.2 05/28/2024 05:26 AM    LYMPHOPCT 20 05/28/2024 05:26 AM    MONOPCT 10 05/28/2024 05:26 AM    EOSPCT 3 05/28/2024 05:26 AM    BASOPCT 1 05/28/2024 05:26 AM    MONOSABS 0.52 05/28/2024 05:26 AM    EOSABS 0.17 05/28/2024 05:26 AM    BASOSABS 0.04 05/28/2024 05:26 AM     BMP:    Lab Results   Component Value Date/Time     05/28/2024 05:26 AM    K 3.4 05/28/2024 05:26 AM     05/28/2024 05:26 AM    CO2 21 05/28/2024 05:26 AM    BUN 6 05/28/2024 05:26 AM    CREATININE 0.7 05/28/2024 05:26 AM    CALCIUM 8.6 05/28/2024 05:26 AM    GFRAA >60 10/15/2021 09:50 AM    LABGLOM 84 05/28/2024 05:26 AM    GLUCOSE 79 05/28/2024 05:26 AM       Assessment:  Odynophagia with radiographic evidence of esophagitis  Severe protein calorie malnutrition with dehydration in the setting of minimal oral intake  Previous history of CVA  Vitamin D deficiency    Plan:   Antifungal therapy is being employed for coverage of thrush.  Plans are for EGD evaluation today.  IV fluid resuscitation is being undertaken.  IV Protonix is being utilized and the patient describes less odynophagia today.  Rapid strep screen is pending.  The patient is granted medical and cardiac clearance for EGD evaluation today.  I have discussed the case with both the cardiology and GI teams.  Continue current therapy.  See orders for further plan of care.    More than 50% of my time was spent at the

## 2024-05-28 NOTE — ANESTHESIA POSTPROCEDURE EVALUATION
Department of Anesthesiology  Postprocedure Note    Patient: Erin Reyna  MRN: [id number]  YOB: 1940  Date of evaluation: 5/28/2024    Procedure Summary       Date: 05/28/24 Room / Location: [place] [address] / [facility]    Anesthesia Start: 1549 Anesthesia Stop: 1611    Procedures:       ESOPHAGOGASTRODUODENOSCOPY BIOPSY      ESOPHAGOGASTRODUODENOSCOPY DILATATION Diagnosis:       Dysphagia, unspecified type      (Dysphagia, unspecified type [R13.10])    Surgeons: Myron Mart DO Responsible Provider: Ramirez Segovia MD    Anesthesia Type: MAC ASA Status: 3            Anesthesia Type: No value filed.    Laura Phase I:      Laura Phase II: Laura Score: 9    Anesthesia Post Evaluation    Patient location during evaluation: PACU  Patient participation: complete - patient participated  Level of consciousness: awake  Airway patency: patent  Nausea & Vomiting: no nausea and no vomiting  Cardiovascular status: hemodynamically stable  Respiratory status: acceptable  Hydration status: euvolemic  Pain management: adequate    No notable events documented.

## 2024-05-28 NOTE — PROGRESS NOTES
OCCUPATIONAL THERAPY INITIAL EVALUATION    [facility]  [address] [place]. [place]        Date:2024                                                  Patient Name: Erin Reyna    MRN: [id number]    : 1940    Room: [address] [place] [address]      Evaluating OT: Belen Altamirano OTR/L; [id number]     Referring Provider and Specific Provider Orders/Date:      24  OT eval and treat  Start:  24,   End:  24,   ONE TIME,   Standing Count:  1 Occurrences,   R         Naresh Moreno, DO      Placement Recommendation: Subacute        Diagnosis:   1. Esophagitis    2. Abnormal EKG    3. Nursing home resident    4. Acute coronary syndrome (HCC)         Surgery: none       Pertinent Medical History:       Past Medical History:   Diagnosis Date    Carpal tunnel syndrome, bilateral     L hand OR 7--19     DDD (degenerative disc disease), cervical     Elevated hemoglobin (HCC) 2018    Lumbar radiculopathy     Osteoarthritis          Past Surgical History:   Procedure Laterality Date    ANESTHESIA NERVE BLOCK Bilateral 12/10/2019    RIGHT L5 AND LEFT L4 TRANSFORAMINAL LUMBAR EPIDURAL UNDER FLUOROSCOPY (CPT 82748) performed by Modesto Pearson MD at [facility] OR    APPENDECTOMY      CARPAL TUNNEL RELEASE Right 2019    RIGHT ENDOSCOPIC CARPAL TUNNEL RELEASE performed by Santana Alvarado MD at [facility] OR    CARPAL TUNNEL RELEASE Left 2019    LEFT ENDOSCOPIC CARPAL TUNNEL RELEASE performed by Santana Alvarado MD at [facility] OR    CATARACT EXTRACTION W/  INTRAOCULAR LENS IMPLANT Right 13    CATARACT EXTRACTION W/  INTRAOCULAR LENS IMPLANT Left 13    CHOLECYSTECTOMY      open    EPIDURAL STEROID INJECTION N/A 2019    LUMBAR EPIDURAL STEROID INJECTION L4-5 UNDER FLUOROSCOPIC GUIDANCE performed by Modesto Pearson MD at [facility] OR    EPIDURAL STEROID INJECTION Bilateral 9/3/2019    ATTAMPTED BILATERAL LUMBAR  mobility/transfer techniques   Instruction/training on energy conservation/work simplification for completion of ADLs   Instruction/training on proper positioning/alignment to prevent contractures     Rehab Potential: Good for established goals.      Patient / Family Goal: return home       Patient and/or family were instructed on functional diagnosis, prognosis/goals and OT plan of care. Demonstrated good understanding.     Eval Complexity: Low    Time In and Out: 9:10-9:20am and 10:38-11:00am    Total Treatment Time: 23      Min Units   OT Eval Low 97165  X  1    OT Eval Medium 81726      OT Eval High 79834      OT Re-Eval 58243            ADL/Self Care 61263  15  1   Therapeutic Activities 86030   8   1    Therapeutic Ex 59602       Orthotic Management 51008       Manual [id number]     Neuro Re-Ed 23149       Non-Billable Time        Evaluation Time additionally includes thorough review of current medical information, gathering information on past medical history/social history and prior level of function, interpretation of standardized testing/informal observation of tasks, assessment of data and development of plan of care and goals.        Evaluating OT: Belen Altaimrano OTR/L; [id number]

## 2024-05-28 NOTE — CARE COORDINATION
Case Management Assessment  Initial Evaluation    Date/Time of Evaluation: 5/28/2024 9:59 AM  Assessment Completed by: PHILIP Kilgore    If patient is discharged prior to next notation, then this note serves as note for discharge by case management.    Patient Name: Erin Reyna                   YOB: 1940  Diagnosis: Esophagitis [K20.90]  Abnormal EKG [R94.31]  Odynophagia [R13.10]  Nursing home resident [Z59.3]                   Date / Time: 5/26/2024  8:49 AM    Patient Admission Status: Inpatient   Readmission Risk (Low < 19, Mod (19-27), High > 27): Readmission Risk Score: 11.5    Current PCP: Unruly Smith MD  PCP verified by CM? Yes    Chart Reviewed: Yes      History Provided by: Patient, Medical Record  Patient Orientation: Alert and Oriented    Patient Cognition: Alert    Hospitalization in the last 30 days (Readmission):  No    If yes, Readmission Assessment in  Navigator will be completed.    Advance Directives:      Code Status: DNR-CC   Patient's Primary Decision Maker is: Legal Next of Kin      Discharge Planning:    Patient lives with: Other (Comment) Type of Home: Long-Term Care  Primary Care Giver: Other (Comment) (facility staff)  Patient Support Systems include: Children   Current Financial resources: Medicare, Medicaid  Current community resources: ECF/Home Care  Current services prior to admission: Long Term Acute Care            Current DME:              Type of Home Care services:  None    ADLS  Prior functional level: Assistance with the following:, Mobility, Bathing, Dressing, Toileting, Cooking  Current functional level: Assistance with the following:, Bathing, Dressing, Toileting, Cooking, Mobility    PT AM-PAC:   /24  OT AM-PAC:   /24    Family can provide assistance at DC: No  Would you like Case Management to discuss the discharge plan with any other family members/significant others, and if so, who? Yes (daughters)  Plans to Return to Present

## 2024-05-28 NOTE — PROGRESS NOTES
PROGRESS NOTE  By Martín Desai M.D.    [facility]  Dr. Saul Leyva M.D.,  Dr. Galo Agosto M.D.,   CHIRAG Moore.O.,  Dr. Catarino Hinojosa M.D.,  Dr. Atif Jorgensen D.O.,          Erin ENG drewel  84 y.o.  female    SUBJECTIVE:  Denies abdominal pain.  Denies nausea or vomiting    OBJECTIVE:    /84   Pulse 88   Temp 98.3 °F (36.8 °C) (Oral)   Resp 16   Ht 1.626 m (5' 4\")   Wt 63.5 kg (140 lb)   SpO2 95%   BMI 24.03 kg/m²     General: NAD/older adult  female.  HEENT: Anicteric sclera/moist oral mucosa  Neck: Supple with trachea midline  Chest: Symmetric excursion/nonlabored respirations  Cor: Regular/S1-S2  Abd.: Soft/appears nontender and nondistended.  BS +  Extr.:  No significant peripheral edema  Skin: Warm and dry/anicteric      DATA:       Lab Results   Component Value Date/Time    WBC 5.5 05/28/2024 05:26 AM    RBC 4.74 05/28/2024 05:26 AM    HGB 14.6 05/28/2024 05:26 AM    HCT 44.4 05/28/2024 05:26 AM    MCV 93.7 05/28/2024 05:26 AM    MCH 30.8 05/28/2024 05:26 AM    MCHC 32.9 05/28/2024 05:26 AM    RDW 13.2 05/28/2024 05:26 AM     05/28/2024 05:26 AM    MPV 8.9 05/28/2024 05:26 AM     Lab Results   Component Value Date/Time     05/28/2024 05:26 AM    K 3.4 05/28/2024 05:26 AM     05/28/2024 05:26 AM    CO2 21 05/28/2024 05:26 AM    BUN 6 05/28/2024 05:26 AM    CREATININE 0.7 05/28/2024 05:26 AM    CALCIUM 8.6 05/28/2024 05:26 AM    BILITOT 0.5 05/28/2024 05:26 AM    ALKPHOS 87 05/28/2024 05:26 AM    AST 16 05/28/2024 05:26 AM    ALT 9 05/28/2024 05:26 AM     No results found for: \"LIPASE\"  No results found for: \"AMYLASE\"      ASSESSMENT/PLAN:  Patient Active Problem List   Diagnosis    Bilateral carpal tunnel syndrome    Elevated hemoglobin (HCC)    Right carpal tunnel syndrome    DDD (degenerative disc disease), lumbar    Spondylolisthesis, lumbar region    Spinal stenosis of lumbar region    Lumbosacral spondylosis without myelopathy     Lumbar radiculopathy    Odynophagia    Esophagitis     Dysphagia / odynophagia  -Fairly rapid onset 2 days ago per patient description  -CT chest 5/26/2024 showing mild circumferential wall thickening of the proximal esophagus  -Continue PPI BID  -Plan for EGD -cardiac workup in progress -pending 2D echo -     2.  Chest pain / elevated troponin  -Cardiology input as above-Per admitting/cardiology     3.  Comorbidities  -Per admitting     EGD when cleared from cardiology standpoint -pending 2D echo +/- stress test     Martín Desai MD  5/28/2024  7:51 AM    NOTE:  This report was transcribed using voice recognition software.  Every effort was made to ensure accuracy; however, inadvertent computerized transcription errors may be present.

## 2024-05-29 ENCOUNTER — APPOINTMENT (OUTPATIENT)
Age: 84
DRG: 391 | End: 2024-05-29
Attending: INTERNAL MEDICINE
Payer: COMMERCIAL

## 2024-05-29 ENCOUNTER — APPOINTMENT (OUTPATIENT)
Dept: NUCLEAR MEDICINE | Age: 84
DRG: 391 | End: 2024-05-29
Payer: COMMERCIAL

## 2024-05-29 ENCOUNTER — APPOINTMENT (OUTPATIENT)
Dept: GENERAL RADIOLOGY | Age: 84
DRG: 391 | End: 2024-05-29
Payer: COMMERCIAL

## 2024-05-29 LAB
ALBUMIN SERPL-MCNC: 3.2 G/DL (ref 3.5–5.2)
ALP SERPL-CCNC: 83 U/L (ref 35–104)
ALT SERPL-CCNC: 7 U/L (ref 0–32)
ANION GAP SERPL CALCULATED.3IONS-SCNC: 12 MMOL/L (ref 7–16)
AST SERPL-CCNC: 18 U/L (ref 0–31)
BASOPHILS # BLD: 0.03 K/UL (ref 0–0.2)
BASOPHILS NFR BLD: 1 % (ref 0–2)
BILIRUB SERPL-MCNC: 0.5 MG/DL (ref 0–1.2)
BUN SERPL-MCNC: 4 MG/DL (ref 6–23)
CALCIUM SERPL-MCNC: 8.6 MG/DL (ref 8.6–10.2)
CHLORIDE SERPL-SCNC: 105 MMOL/L (ref 98–107)
CO2 SERPL-SCNC: 23 MMOL/L (ref 22–29)
CREAT SERPL-MCNC: 0.7 MG/DL (ref 0.5–1)
ECHO AO ASC DIAM: 3 CM
ECHO AO ASCENDING AORTA INDEX: 1.79 CM/M2
ECHO AV AREA PEAK VELOCITY: 2.3 CM2
ECHO AV AREA VTI: 3.5 CM2
ECHO AV AREA/BSA PEAK VELOCITY: 1.4 CM2/M2
ECHO AV AREA/BSA VTI: 2.1 CM2/M2
ECHO AV CUSP MM: 1.4 CM
ECHO AV MEAN GRADIENT: 2 MMHG
ECHO AV MEAN VELOCITY: 0.7 M/S
ECHO AV PEAK GRADIENT: 4 MMHG
ECHO AV PEAK VELOCITY: 1 M/S
ECHO AV VELOCITY RATIO: 0.8
ECHO AV VTI: 19.3 CM
ECHO BSA: 1.69 M2
ECHO BSA: 1.69 M2
ECHO EST RA PRESSURE: 8 MMHG
ECHO LA DIAMETER INDEX: 1.49 CM/M2
ECHO LA DIAMETER: 2.5 CM
ECHO LA VOL A-L A2C: 10 ML (ref 22–52)
ECHO LA VOL A-L A4C: 22 ML (ref 22–52)
ECHO LA VOL BP: 15 ML (ref 22–52)
ECHO LA VOL MOD A2C: 8 ML (ref 22–52)
ECHO LA VOL MOD A4C: 20 ML (ref 22–52)
ECHO LA VOL/BSA BIPLANE: 9 ML/M2 (ref 16–34)
ECHO LA VOLUME AREA LENGTH: 17 ML
ECHO LA VOLUME INDEX A-L A2C: 6 ML/M2 (ref 16–34)
ECHO LA VOLUME INDEX A-L A4C: 13 ML/M2 (ref 16–34)
ECHO LA VOLUME INDEX AREA LENGTH: 10 ML/M2 (ref 16–34)
ECHO LA VOLUME INDEX MOD A2C: 5 ML/M2 (ref 16–34)
ECHO LA VOLUME INDEX MOD A4C: 12 ML/M2 (ref 16–34)
ECHO LV EDV A2C: 29 ML
ECHO LV EDV A4C: 41 ML
ECHO LV EDV BP: 38 ML (ref 56–104)
ECHO LV EDV INDEX A4C: 24 ML/M2
ECHO LV EDV INDEX BP: 23 ML/M2
ECHO LV EDV NDEX A2C: 17 ML/M2
ECHO LV EJECTION FRACTION A2C: 48 %
ECHO LV EJECTION FRACTION A4C: 59 %
ECHO LV EJECTION FRACTION BIPLANE: 57 % (ref 55–100)
ECHO LV ESV A2C: 15 ML
ECHO LV ESV A4C: 17 ML
ECHO LV ESV BP: 16 ML (ref 19–49)
ECHO LV ESV INDEX A2C: 9 ML/M2
ECHO LV ESV INDEX A4C: 10 ML/M2
ECHO LV ESV INDEX BP: 10 ML/M2
ECHO LV FRACTIONAL SHORTENING: 28 % (ref 28–44)
ECHO LV INTERNAL DIMENSION DIASTOLE INDEX: 1.9 CM/M2
ECHO LV INTERNAL DIMENSION DIASTOLIC: 3.2 CM (ref 3.9–5.3)
ECHO LV INTERNAL DIMENSION SYSTOLIC INDEX: 1.37 CM/M2
ECHO LV INTERNAL DIMENSION SYSTOLIC: 2.3 CM
ECHO LV ISOVOLUMETRIC RELAXATION TIME (IVRT): 95.2 MS
ECHO LV IVSD: 0.9 CM (ref 0.6–0.9)
ECHO LV MASS 2D: 71.2 G (ref 67–162)
ECHO LV MASS INDEX 2D: 42.4 G/M2 (ref 43–95)
ECHO LV POSTERIOR WALL DIASTOLIC: 0.8 CM (ref 0.6–0.9)
ECHO LV RELATIVE WALL THICKNESS RATIO: 0.5
ECHO LVOT AREA: 3.1 CM2
ECHO LVOT AV VTI INDEX: 1.19
ECHO LVOT DIAM: 2 CM
ECHO LVOT MEAN GRADIENT: 1 MMHG
ECHO LVOT PEAK GRADIENT: 3 MMHG
ECHO LVOT PEAK VELOCITY: 0.8 M/S
ECHO LVOT STROKE VOLUME INDEX: 42.8 ML/M2
ECHO LVOT SV: 71.9 ML
ECHO LVOT VTI: 22.9 CM
ECHO MV "A" WAVE DURATION: 118 MSEC
ECHO MV A VELOCITY: 0.92 M/S
ECHO MV AREA PHT: 5 CM2
ECHO MV AREA VTI: 5.4 CM2
ECHO MV E DECELERATION TIME (DT): 255.5 MS
ECHO MV E VELOCITY: 0.6 M/S
ECHO MV E/A RATIO: 0.65
ECHO MV LVOT VTI INDEX: 0.59
ECHO MV MAX VELOCITY: 0.8 M/S
ECHO MV MEAN GRADIENT: 1 MMHG
ECHO MV MEAN VELOCITY: 0.4 M/S
ECHO MV PEAK GRADIENT: 3 MMHG
ECHO MV PRESSURE HALF TIME (PHT): 44 MS
ECHO MV VTI: 13.4 CM
ECHO PV MAX VELOCITY: 0.8 M/S
ECHO PV MEAN GRADIENT: 2 MMHG
ECHO PV MEAN VELOCITY: 0.6 M/S
ECHO PV PEAK GRADIENT: 3 MMHG
ECHO PV VTI: 13.3 CM
ECHO RV INTERNAL DIMENSION: 2.1 CM
ECHO RV LONGITUDINAL DIMENSION: 6.3 CM
ECHO RV MID DIMENSION: 2.5 CM
EKG ATRIAL RATE: 71 BPM
EKG P AXIS: 34 DEGREES
EKG P-R INTERVAL: 190 MS
EKG Q-T INTERVAL: 380 MS
EKG QRS DURATION: 72 MS
EKG QTC CALCULATION (BAZETT): 412 MS
EKG R AXIS: -10 DEGREES
EKG T AXIS: -106 DEGREES
EKG VENTRICULAR RATE: 71 BPM
EOSINOPHIL # BLD: 0.31 K/UL (ref 0.05–0.5)
EOSINOPHILS RELATIVE PERCENT: 5 % (ref 0–6)
ERYTHROCYTE [DISTWIDTH] IN BLOOD BY AUTOMATED COUNT: 13 % (ref 11.5–15)
GFR, ESTIMATED: 86 ML/MIN/1.73M2
GLUCOSE SERPL-MCNC: 113 MG/DL (ref 74–99)
HCT VFR BLD AUTO: 42.7 % (ref 34–48)
HGB BLD-MCNC: 14.6 G/DL (ref 11.5–15.5)
IMM GRANULOCYTES # BLD AUTO: <0.03 K/UL (ref 0–0.58)
IMM GRANULOCYTES NFR BLD: 0 % (ref 0–5)
LYMPHOCYTES NFR BLD: 1.28 K/UL (ref 1.5–4)
LYMPHOCYTES RELATIVE PERCENT: 21 % (ref 20–42)
MAGNESIUM SERPL-MCNC: 1.9 MG/DL (ref 1.6–2.6)
MCH RBC QN AUTO: 31.6 PG (ref 26–35)
MCHC RBC AUTO-ENTMCNC: 34.2 G/DL (ref 32–34.5)
MCV RBC AUTO: 92.4 FL (ref 80–99.9)
MICROORGANISM SPEC CULT: NORMAL
MONOCYTES NFR BLD: 0.54 K/UL (ref 0.1–0.95)
MONOCYTES NFR BLD: 9 % (ref 2–12)
NEUTROPHILS NFR BLD: 64 % (ref 43–80)
NEUTS SEG NFR BLD: 3.9 K/UL (ref 1.8–7.3)
PHOSPHATE SERPL-MCNC: 1.9 MG/DL (ref 2.5–4.5)
PLATELET # BLD AUTO: 182 K/UL (ref 130–450)
PMV BLD AUTO: 9.1 FL (ref 7–12)
POTASSIUM SERPL-SCNC: 3.1 MMOL/L (ref 3.5–5)
PROT SERPL-MCNC: 6 G/DL (ref 6.4–8.3)
RBC # BLD AUTO: 4.62 M/UL (ref 3.5–5.5)
SODIUM SERPL-SCNC: 140 MMOL/L (ref 132–146)
SPECIMEN DESCRIPTION: NORMAL
WBC OTHER # BLD: 6.1 K/UL (ref 4.5–11.5)

## 2024-05-29 PROCEDURE — 2580000003 HC RX 258

## 2024-05-29 PROCEDURE — 93306 TTE W/DOPPLER COMPLETE: CPT | Performed by: INTERNAL MEDICINE

## 2024-05-29 PROCEDURE — 92611 MOTION FLUOROSCOPY/SWALLOW: CPT | Performed by: SPEECH-LANGUAGE PATHOLOGIST

## 2024-05-29 PROCEDURE — 6370000000 HC RX 637 (ALT 250 FOR IP): Performed by: INTERNAL MEDICINE

## 2024-05-29 PROCEDURE — 84100 ASSAY OF PHOSPHORUS: CPT

## 2024-05-29 PROCEDURE — 99233 SBSQ HOSP IP/OBS HIGH 50: CPT | Performed by: INTERNAL MEDICINE

## 2024-05-29 PROCEDURE — 97110 THERAPEUTIC EXERCISES: CPT

## 2024-05-29 PROCEDURE — 3430000000 HC RX DIAGNOSTIC RADIOPHARMACEUTICAL: Performed by: RADIOLOGY

## 2024-05-29 PROCEDURE — 2500000003 HC RX 250 WO HCPCS: Performed by: NURSE PRACTITIONER

## 2024-05-29 PROCEDURE — 83735 ASSAY OF MAGNESIUM: CPT

## 2024-05-29 PROCEDURE — A9500 TC99M SESTAMIBI: HCPCS | Performed by: RADIOLOGY

## 2024-05-29 PROCEDURE — 74230 X-RAY XM SWLNG FUNCJ C+: CPT

## 2024-05-29 PROCEDURE — 36415 COLL VENOUS BLD VENIPUNCTURE: CPT

## 2024-05-29 PROCEDURE — 2500000003 HC RX 250 WO HCPCS: Performed by: INTERNAL MEDICINE

## 2024-05-29 PROCEDURE — A4216 STERILE WATER/SALINE, 10 ML: HCPCS

## 2024-05-29 PROCEDURE — C9113 INJ PANTOPRAZOLE SODIUM, VIA: HCPCS

## 2024-05-29 PROCEDURE — 93306 TTE W/DOPPLER COMPLETE: CPT

## 2024-05-29 PROCEDURE — 6360000002 HC RX W HCPCS

## 2024-05-29 PROCEDURE — 85025 COMPLETE CBC W/AUTO DIFF WBC: CPT

## 2024-05-29 PROCEDURE — 1200000000 HC SEMI PRIVATE

## 2024-05-29 PROCEDURE — 6360000002 HC RX W HCPCS: Performed by: INTERNAL MEDICINE

## 2024-05-29 PROCEDURE — 97530 THERAPEUTIC ACTIVITIES: CPT

## 2024-05-29 PROCEDURE — 2580000003 HC RX 258: Performed by: INTERNAL MEDICINE

## 2024-05-29 PROCEDURE — 80053 COMPREHEN METABOLIC PANEL: CPT

## 2024-05-29 PROCEDURE — 93010 ELECTROCARDIOGRAM REPORT: CPT | Performed by: INTERNAL MEDICINE

## 2024-05-29 RX ORDER — REGADENOSON 0.08 MG/ML
0.4 INJECTION, SOLUTION INTRAVENOUS
OUTPATIENT
Start: 2024-05-30

## 2024-05-29 RX ORDER — TETRAKIS(2-METHOXYISOBUTYLISOCYANIDE)COPPER(I) TETRAFLUOROBORATE 1 MG/ML
10 INJECTION, POWDER, LYOPHILIZED, FOR SOLUTION INTRAVENOUS
Status: COMPLETED | OUTPATIENT
Start: 2024-05-29 | End: 2024-05-29

## 2024-05-29 RX ADMIN — DEXTROSE MONOHYDRATE, SODIUM CHLORIDE, AND POTASSIUM CHLORIDE: 50; 4.5; 1.49 INJECTION, SOLUTION INTRAVENOUS at 04:32

## 2024-05-29 RX ADMIN — DEXTROSE MONOHYDRATE, SODIUM CHLORIDE, AND POTASSIUM CHLORIDE: 50; 4.5; 1.49 INJECTION, SOLUTION INTRAVENOUS at 20:55

## 2024-05-29 RX ADMIN — Medication 10 MILLICURIE: at 09:59

## 2024-05-29 RX ADMIN — POTASSIUM BICARBONATE 25 MEQ: 978 TABLET, EFFERVESCENT ORAL at 14:02

## 2024-05-29 RX ADMIN — SODIUM CHLORIDE, PRESERVATIVE FREE 40 MG: 5 INJECTION INTRAVENOUS at 11:55

## 2024-05-29 RX ADMIN — POTASSIUM BICARBONATE 40 MEQ: 782 TABLET, EFFERVESCENT ORAL at 11:55

## 2024-05-29 RX ADMIN — NYSTATIN 500000 UNITS: 100000 SUSPENSION ORAL at 23:23

## 2024-05-29 RX ADMIN — ENOXAPARIN SODIUM 40 MG: 100 INJECTION SUBCUTANEOUS at 14:02

## 2024-05-29 RX ADMIN — Medication 2000 UNITS: at 11:55

## 2024-05-29 RX ADMIN — WATER 1000 MG: 1 INJECTION INTRAMUSCULAR; INTRAVENOUS; SUBCUTANEOUS at 06:33

## 2024-05-29 RX ADMIN — NYSTATIN 500000 UNITS: 100000 SUSPENSION ORAL at 14:02

## 2024-05-29 RX ADMIN — NYSTATIN 500000 UNITS: 100000 SUSPENSION ORAL at 11:55

## 2024-05-29 RX ADMIN — BARIUM SULFATE 45 ML: 400 PASTE ORAL at 09:37

## 2024-05-29 RX ADMIN — BARIUM SULFATE 45 ML: 400 SUSPENSION ORAL at 09:37

## 2024-05-29 RX ADMIN — FLUCONAZOLE IN SODIUM CHLORIDE 200 MG: 2 INJECTION, SOLUTION INTRAVENOUS at 11:59

## 2024-05-29 RX ADMIN — BARIUM SULFATE 45 G: 0.81 POWDER, FOR SUSPENSION ORAL at 09:37

## 2024-05-29 RX ADMIN — SODIUM CHLORIDE, PRESERVATIVE FREE 40 MG: 5 INJECTION INTRAVENOUS at 23:23

## 2024-05-29 NOTE — PROGRESS NOTES
SPEECH/LANGUAGE PATHOLOGY  VIDEOFLUOROSCOPIC STUDY OF SWALLOWING (MBS)   and PLAN OF CARE    PATIENT NAME:  Erin Reyna  (female)     MRN:  [id number]    :  1940  (84 y.o.)  STATUS:  Inpatient: Room     TODAY'S DATE:  2024  REFERRING PROVIDER:   Dr. Mart   SPECIFIC PROVIDER ORDER: SLP video swallow  Date of order:  24   REASON FOR REFERRAL: dysphagia    EVALUATING THERAPIST: MICHELLE Frankel      RESULTS:      DYSPHAGIA DIAGNOSIS:  normal swallow function     DIET RECOMMENDATIONS:  Soft and bite size consistency solids (IDDSI level 6) with  thin liquids (IDDSI level 0)---diet advancement per GI     FEEDING RECOMMENDATIONS:    Assistance level:  No assistance needed     Compensatory strategies recommended: No strategies are recommended at this time     Discussed recommendations with:  via note left at charge nurse desk    SPEECH THERAPY  PLAN OF CARE   The dysphagia POC is established based on physician order and dysphagia diagnosis    Dysphagia therapy is not recommended       Conditions Requiring Skilled Therapeutic Intervention for dysphagia:    not applicable    SPECIFIC DYSPHAGIA INTERVENTIONS TO INCLUDE:     Not applicable    Specific instructions for next treatment:  not applicable   Treatment Goals:    Short Term Goals:  Not applicable no therapy warranted     Long Term Goals:   Not applicable no therapy warranted      Patient/family Goal:    not applicable                    ADMITTING DIAGNOSIS: Esophagitis [K20.90]  Abnormal EKG [R94.31]  Odynophagia [R13.10]  Nursing home resident [Z59.3]     VISIT DIAGNOSIS:   Visit Diagnoses         Codes    Abnormal EKG     R94.31    Nursing home resident     Z59.3    Acute coronary syndrome (HCC)     I24.9    Dysphagia, unspecified type     R13.10    Abnormal electrocardiography     R94.31    Chest pain, unspecified type     R07.9                PATIENT REPORT/COMPLAINT: denies difficulty swallowing had EGD yesterday     PRIOR  LEVEL OF SWALLOW FUNCTION:    Past History of Dysphagia?:  none reported    Home diet: Regular consistency solids (IDDSI level 7) with  thin liquids (IDDSI level 0)      Current Diet Order:  ADULT DIET; Dysphagia - Soft and Bite Sized  Diet NPO Exceptions are: Sips of Water with Meds    PROCEDURE:  Consistencies Administered During the Evaluation   Liquids: thin liquid and nectar thick liquid   Solids:  Pureed  and Hard solid      Method of Intake:   cup, straw, spoon  Self fed      Position:   [facility] with head elevated above 75 degrees    INSTRUMENTAL ASSESSMENT:      MBSImP Results:   Lip closure for intraoral bolus containment resulted in no labial escape. Tongue control during bolus hold maintained a cohesive bolus held between tongue to palate seal. Bolus preparation and mastication resulted in timely and efficient chewing and mashing. Bolus transport/lingual motion was with brisk tongue motion. Oral residue was not observed. There was complete oral clearance.     Initiation of the pharyngeal swallow occurred as the bolus head reached the posterior angle of the mandibular ramus. Soft palate elevation resulted in no bolus between the soft palate and the pharyngeal wall. Laryngeal elevation demonstrated complete superior movement of the thyroid cartilage with complete approximation of the arytenoids to the epiglottic petiole. Anterior hyoid excursion demonstrated complete anterior movement. Epiglottic movement resulted in complete inversion. Laryngeal vestibule closure was complete, as indicated by no air or contrast within the laryngeal vestibule at the height of the swallow. Pharyngeal stripping wave was present and complete. Pharyngeal contraction could not be determined due to logistical reasons not related to physiologic impairment.     Pharyngoesophageal segment opening was completely distended for complete duration with no obstruction of bolus flow. Tongue base retraction allowed no

## 2024-05-29 NOTE — CARE COORDINATION
5/29/2024: SS NOTE  Patient was admitted from [facility], per admissions liaison [place] pt is a long term care bedhold there and will accept pt back, liaison is following for PT/OT evals to attempt to accept pt back skilled and to SUBMIT PRE CERT, JORDY initiated, Nursing informed.Electronically signed by PHILIP Engel on 5/29/2024 at 12:03 PM

## 2024-05-29 NOTE — PROGRESS NOTES
Physical Therapy Treatment Note/Plan of Care    Room #:  0330/0330-02  Patient Name: Erin Reyna  YOB: 1940  MRN: [id number]    Date of Service: 5/29/2024     Tentative placement recommendation: Subacute Rehab  Equipment recommendation: To be determined      Evaluating Physical Therapist: Jimy Sommers, PT #[id number]      Specific Provider Orders/Date/Referring Provider :   05/26/24 1715    PT eval and treat  Start:  05/26/24 1715,   End:  05/26/24 1715,   ONE TIME,   Standing Count:  1 Occurrences,   R       Emmanuel Wong DO    Admitting Diagnosis:   Esophagitis [K20.90]  Abnormal EKG [R94.31]  Odynophagia [R13.10]  Nursing home resident [Z59.3]      Surgery: none  Visit Diagnoses         Codes    Abnormal EKG     R94.31    Nursing home resident     Z59.3    Acute coronary syndrome (HCC)     I24.9    Dysphagia, unspecified type     R13.10    Abnormal electrocardiography     R94.31    Chest pain, unspecified type     R07.9            Patient Active Problem List   Diagnosis    Bilateral carpal tunnel syndrome    Elevated hemoglobin (HCC)    Right carpal tunnel syndrome    DDD (degenerative disc disease), lumbar    Spondylolisthesis, lumbar region    Spinal stenosis of lumbar region    Lumbosacral spondylosis without myelopathy    Lumbar radiculopathy    Odynophagia    Esophagitis        ASSESSMENT of Current Deficits Patient exhibits decreased strength, balance, and endurance impairing functional mobility, transfers, gait distance, and tolerance to activity. Patient required encouragement to participate with therapy. Performed supine LE exercises, AROM. Patient stood to walker with max assist, cues for hand placement with patient not following commands and pulling up on walker instead. The patient will benefit from continued skilled therapy to increase strength and improve balance for safe functional mobility, to decrease risk of falls, and to meet goals at discharge.         PHYSICAL THERAPY     none stated    Time in  239  Time out  302    Total Treatment Time  23 minutes    CPT codes:  Therapeutic activities (10610)   13 minutes  1 unit(s)  Therapeutic exercises (45491)   10 minutes  1 unit(s)    Kelsey Wharton, [facility] [place]   #[id number]

## 2024-05-29 NOTE — CARE COORDINATION
Patient off the floor during rounds.  Will see later today or tomorrow -please, call with questions/concerns.  Thank you    Martín Desai MD

## 2024-05-29 NOTE — PROGRESS NOTES
Physical Therapy      Physical Therapy    Room #:   0330/0330-02    Date: 2024       Patient Name: Erin Reyna  : 1940      MRN: [id number]     Patient unavailable for physical therapy treatment due to refusal. Will attempt PT treatment at a later time. Thank you.      Kelsey Wharton, PTA  #[id number]

## 2024-05-29 NOTE — PLAN OF CARE
Problem: Discharge Planning  Goal: Discharge to home or other facility with appropriate resources  5/29/2024 0115 by Cliff Anton RN  Outcome: Progressing  5/28/2024 1806 by Nasra Jyoner RN  Outcome: Progressing     Problem: Pain  Goal: Verbalizes/displays adequate comfort level or baseline comfort level  5/29/2024 0115 by Cliff Anton RN  Outcome: Progressing  5/28/2024 1806 by Nasra Joyner RN  Outcome: Progressing     Problem: Skin/Tissue Integrity  Goal: Absence of new skin breakdown  Description: 1.  Monitor for areas of redness and/or skin breakdown  2.  Assess vascular access sites hourly  3.  Every 4-6 hours minimum:  Change oxygen saturation probe site  4.  Every 4-6 hours:  If on nasal continuous positive airway pressure, respiratory therapy assess nares and determine need for appliance change or resting period.  5/29/2024 0115 by Cliff Anton RN  Outcome: Progressing  5/28/2024 1806 by Nasra Joyner RN  Outcome: Progressing     Problem: Safety - Adult  Goal: Free from fall injury  5/29/2024 0115 by Cliff Anton RN  Outcome: Progressing  5/28/2024 1806 by Nasra Joyner RN  Outcome: Progressing

## 2024-05-29 NOTE — PROGRESS NOTES
CHIEF COMPLAINT: Pre-procedural clearance.    HISTORY OF PRESENT ILLNESS: Patient is a 84 y.o. female seen at the request of Unruly Smith MD and not followed by cardiology.    Presented with painful swallowing. Recommended for EGD.     Cardiology consulted for pre-procedural risk stratification.     No overt angina or SOB.     Limited mobility due to prior CVA.     Past Medical History:   Diagnosis Date    Carpal tunnel syndrome, bilateral     L hand OR 7-2-19     DDD (degenerative disc disease), cervical     Elevated hemoglobin (HCC) 8/6/2018    Lumbar radiculopathy     Osteoarthritis        Patient Active Problem List   Diagnosis    Bilateral carpal tunnel syndrome    Elevated hemoglobin (HCC)    Right carpal tunnel syndrome    DDD (degenerative disc disease), lumbar    Spondylolisthesis, lumbar region    Spinal stenosis of lumbar region    Lumbosacral spondylosis without myelopathy    Lumbar radiculopathy    Odynophagia    Esophagitis       Allergies   Allergen Reactions    Hydrocodone-Acetaminophen Itching and Nausea Only       Current Facility-Administered Medications   Medication Dose Route Frequency Provider Last Rate Last Admin    dextrose 5 % and 0.45 % NaCl with KCl 20 mEq infusion   IntraVENous Continuous Tom Ma APRN -  mL/hr at 05/29/24 0432 New Bag at 05/29/24 0432    fluconazole (DIFLUCAN) in 0.9 % sodium chloride IVPB 200 mg  200 mg IntraVENous Daily Naresh Moreno DO        Vitamin D (CHOLECALCIFEROL) tablet 2,000 Units  2,000 Units Oral Daily Naresh Moreno DO   2,000 Units at 05/28/24 0810    nystatin (MYCOSTATIN) 864902 UNIT/ML suspension 500,000 Units  5 mL Oral 4x Daily Naresh Moreno DO   500,000 Units at 05/28/24 2102    cefTRIAXone (ROCEPHIN) 1,000 mg in sterile water 10 mL IV syringe  1,000 mg IntraVENous Q24H Naresh Moreno DO   1,000 mg at 05/29/24 0633    Benzocaine-Menthol (CEPACOL) 1 lozenge  1 lozenge Oral Q2H PRN Naresh Moreno DO        magnesium sulfate 2000 mg in  Vital Sign     Worried About Running Out of Food in the Last Year: Never true     Ran Out of Food in the Last Year: Never true   Transportation Needs: No Transportation Needs (5/26/2024)    PRAPARE - Transportation     Lack of Transportation (Medical): No     Lack of Transportation (Non-Medical): No   Physical Activity: Not on file   Stress: Not on file   Social Connections: Not on file   Intimate Partner Violence: Not on file   Housing Stability: Low Risk  (5/26/2024)    Housing Stability Vital Sign     Unable to Pay for Housing in the Last Year: No     Number of Places Lived in the Last Year: 1     Unstable Housing in the Last Year: No       Family History   Problem Relation Age of Onset    Cancer Father        Review of Systems:   Heart: as above   Lungs: as above   Eyes: denies changes in vision or discharge.   Ears: denies changes in hearing or pain.   Nose: denies epistaxis or masses   Throat: denies sore throat or trouble swallowing.   Neuro: denies numbness, tingling, tremors.   Skin: denies rashes or itching.   : denies hematuria, dysuria   GI: denies vomiting, diarrhea   Psych: denies mood changed, anxiety, depression.  all others negative.    Physical Exam   /74   Pulse 84   Temp 98 °F (36.7 °C) (Oral)   Resp 16   Ht 1.626 m (5' 4\")   Wt 63.5 kg (140 lb)   SpO2 95%   BMI 24.03 kg/m²   Constitutional: Oriented to person, place, and time. Well-developed and well-nourished. No distress.    Head: Normocephalic and atraumatic.   Eyes: EOM are normal. Pupils are equal, round, and reactive to light.   Neck: Normal range of motion. Neck supple. No hepatojugular reflux and no JVD present. Carotid bruit is not present. No tracheal deviation present. No thyromegaly present.   Cardiovascular: Normal rate, regular rhythm, normal heart sounds and intact distal pulses.  Exam reveals no gallop and no friction rub.  No murmur heard.  Pulmonary/Chest: Effort normal and breath sounds normal. No respiratory

## 2024-05-29 NOTE — PROGRESS NOTES
Internal Medicine Progress Note     [facility]     Emmanuel Wogn D.O., DION Moreno D.O., DION Ellis D.O.     Humaira Jerry, MSN, APRN, NP-C  oTm Ma, MSN, APRN-CNP  Timo Light, MSN, APRN, NP-C  Mary Jo Biwsas, MSN, APRN-CNP  Janna Jimenez, MSN, APRN, NP-C     Primary Care Physician: Unruly Smith MD   Admitting Physician:  Emmanuel Wong DO  Admission date and time: 5/26/2024  8:49 AM    Room:  [address] [place], [place] [address]  Admitting diagnosis: Esophagitis [K20.90]  Abnormal EKG [R94.31]  Odynophagia [R13.10]  Nursing home resident [Z59.3]    Patient Name: Erin Reyna  MRN: [id number]    Date of Service: 5/29/2024     Subjective:  Erin is a 84 y.o. female who was seen and examined today,5/29/2024, at the bedside.  Patient seen better today.  Patient underwent dilatation procedure yesterday and is scheduled for esophagram today.  Patient denies chest pain or shortness of breath      No family members were present during my examination.    Review of System:   Constitutional:   Describes generalized malaise and fatigue.  HEENT:   Persistent posterior pharynx pain.  Thrush  Cardiovascular:   Denies any chest pain, irregular heartbeats, or palpitations.   Respiratory:   Denies shortness of breath, coughing, sputum production, hemoptysis, or wheezing.  Gastrointestinal:   Patient had dilatation yesterday.  For esophagram today  Genitourinary:    Denies any urgency, frequency, hematuria. Voiding  without difficulty.  Extremities:   Denies lower extremity swelling, edema or cyanosis.   Neurology:    Denies any headache or focal neurological deficits, admits to generalized weakness and deconditioning.  Psch:   Anxious regarding ongoing medical issues  Musculoskeletal:    Denies  myalgias, joint complaints or back pain.   Integumentary:   Denies any rashes, ulcers, or excoriations.  Denies bruising.  Hematologic/Lymphatic:  Denies  data as well as instructing and counseling the patient. Time I spent with the family or surrogate(s) is included only if the patient was incapable of providing the necessary information or participating in medical decisions. I also discussed the differential diagnosis and all of the proposed management plans with the patient and individuals accompanying the patient. I am readily available for any further decision-making and intervention.       Emmanuel Wong DO, [email or web address].  5/29/2024  4:17 PM

## 2024-05-30 VITALS
BODY MASS INDEX: 23.9 KG/M2 | HEART RATE: 86 BPM | DIASTOLIC BLOOD PRESSURE: 78 MMHG | TEMPERATURE: 98.2 F | SYSTOLIC BLOOD PRESSURE: 128 MMHG | OXYGEN SATURATION: 96 % | WEIGHT: 140 LBS | HEIGHT: 64 IN | RESPIRATION RATE: 16 BRPM

## 2024-05-30 LAB
ALBUMIN SERPL-MCNC: 3 G/DL (ref 3.5–5.2)
ALP SERPL-CCNC: 83 U/L (ref 35–104)
ALT SERPL-CCNC: 10 U/L (ref 0–32)
ANION GAP SERPL CALCULATED.3IONS-SCNC: 10 MMOL/L (ref 7–16)
AST SERPL-CCNC: 20 U/L (ref 0–31)
BASOPHILS # BLD: 0.03 K/UL (ref 0–0.2)
BASOPHILS NFR BLD: 1 % (ref 0–2)
BILIRUB SERPL-MCNC: 0.5 MG/DL (ref 0–1.2)
BUN SERPL-MCNC: 3 MG/DL (ref 6–23)
CALCIUM SERPL-MCNC: 8.5 MG/DL (ref 8.6–10.2)
CHLORIDE SERPL-SCNC: 107 MMOL/L (ref 98–107)
CO2 SERPL-SCNC: 23 MMOL/L (ref 22–29)
CREAT SERPL-MCNC: 0.7 MG/DL (ref 0.5–1)
EOSINOPHIL # BLD: 0.39 K/UL (ref 0.05–0.5)
EOSINOPHILS RELATIVE PERCENT: 7 % (ref 0–6)
ERYTHROCYTE [DISTWIDTH] IN BLOOD BY AUTOMATED COUNT: 13.2 % (ref 11.5–15)
GFR, ESTIMATED: 81 ML/MIN/1.73M2
GLUCOSE SERPL-MCNC: 125 MG/DL (ref 74–99)
HCT VFR BLD AUTO: 43.1 % (ref 34–48)
HGB BLD-MCNC: 14.5 G/DL (ref 11.5–15.5)
IMM GRANULOCYTES # BLD AUTO: <0.03 K/UL (ref 0–0.58)
IMM GRANULOCYTES NFR BLD: 0 % (ref 0–5)
LYMPHOCYTES NFR BLD: 1.19 K/UL (ref 1.5–4)
LYMPHOCYTES RELATIVE PERCENT: 21 % (ref 20–42)
MAGNESIUM SERPL-MCNC: 1.8 MG/DL (ref 1.6–2.6)
MCH RBC QN AUTO: 30.7 PG (ref 26–35)
MCHC RBC AUTO-ENTMCNC: 33.6 G/DL (ref 32–34.5)
MCV RBC AUTO: 91.3 FL (ref 80–99.9)
MONOCYTES NFR BLD: 0.6 K/UL (ref 0.1–0.95)
MONOCYTES NFR BLD: 10 % (ref 2–12)
NEUTROPHILS NFR BLD: 62 % (ref 43–80)
NEUTS SEG NFR BLD: 3.57 K/UL (ref 1.8–7.3)
PHOSPHATE SERPL-MCNC: 1.8 MG/DL (ref 2.5–4.5)
PLATELET # BLD AUTO: 184 K/UL (ref 130–450)
PMV BLD AUTO: 9.3 FL (ref 7–12)
POTASSIUM SERPL-SCNC: 3.3 MMOL/L (ref 3.5–5)
PROT SERPL-MCNC: 5.7 G/DL (ref 6.4–8.3)
RBC # BLD AUTO: 4.72 M/UL (ref 3.5–5.5)
SODIUM SERPL-SCNC: 140 MMOL/L (ref 132–146)
WBC OTHER # BLD: 5.8 K/UL (ref 4.5–11.5)

## 2024-05-30 PROCEDURE — 6360000002 HC RX W HCPCS: Performed by: INTERNAL MEDICINE

## 2024-05-30 PROCEDURE — 36415 COLL VENOUS BLD VENIPUNCTURE: CPT

## 2024-05-30 PROCEDURE — C9113 INJ PANTOPRAZOLE SODIUM, VIA: HCPCS

## 2024-05-30 PROCEDURE — 2580000003 HC RX 258: Performed by: INTERNAL MEDICINE

## 2024-05-30 PROCEDURE — 80053 COMPREHEN METABOLIC PANEL: CPT

## 2024-05-30 PROCEDURE — 6360000002 HC RX W HCPCS

## 2024-05-30 PROCEDURE — 2500000003 HC RX 250 WO HCPCS: Performed by: INTERNAL MEDICINE

## 2024-05-30 PROCEDURE — 84100 ASSAY OF PHOSPHORUS: CPT

## 2024-05-30 PROCEDURE — 99233 SBSQ HOSP IP/OBS HIGH 50: CPT | Performed by: INTERNAL MEDICINE

## 2024-05-30 PROCEDURE — 6370000000 HC RX 637 (ALT 250 FOR IP): Performed by: INTERNAL MEDICINE

## 2024-05-30 PROCEDURE — 85025 COMPLETE CBC W/AUTO DIFF WBC: CPT

## 2024-05-30 PROCEDURE — 2580000003 HC RX 258

## 2024-05-30 PROCEDURE — 6370000000 HC RX 637 (ALT 250 FOR IP)

## 2024-05-30 PROCEDURE — 83735 ASSAY OF MAGNESIUM: CPT

## 2024-05-30 RX ORDER — FLUCONAZOLE 100 MG/1
100 TABLET ORAL DAILY
Qty: 5 TABLET | Refills: 0 | DISCHARGE
Start: 2024-05-30 | End: 2024-06-04

## 2024-05-30 RX ORDER — PANTOPRAZOLE SODIUM 40 MG/1
40 TABLET, DELAYED RELEASE ORAL
Qty: 90 TABLET | Refills: 1 | DISCHARGE
Start: 2024-05-30

## 2024-05-30 RX ORDER — CHOLECALCIFEROL (VITAMIN D3) 50 MCG
2000 TABLET ORAL DAILY
Qty: 60 TABLET | DISCHARGE
Start: 2024-05-30

## 2024-05-30 RX ADMIN — NYSTATIN 500000 UNITS: 100000 SUSPENSION ORAL at 09:14

## 2024-05-30 RX ADMIN — NYSTATIN 500000 UNITS: 100000 SUSPENSION ORAL at 15:11

## 2024-05-30 RX ADMIN — FLUCONAZOLE IN SODIUM CHLORIDE 200 MG: 2 INJECTION, SOLUTION INTRAVENOUS at 10:25

## 2024-05-30 RX ADMIN — ENOXAPARIN SODIUM 40 MG: 100 INJECTION SUBCUTANEOUS at 15:20

## 2024-05-30 RX ADMIN — DIBASIC SODIUM PHOSPHATE, MONOBASIC POTASSIUM PHOSPHATE AND MONOBASIC SODIUM PHOSPHATE 2 TABLET: 852; 155; 130 TABLET ORAL at 15:11

## 2024-05-30 RX ADMIN — Medication 2000 UNITS: at 09:14

## 2024-05-30 RX ADMIN — WATER 1000 MG: 1 INJECTION INTRAMUSCULAR; INTRAVENOUS; SUBCUTANEOUS at 06:39

## 2024-05-30 RX ADMIN — SODIUM PHOSPHATE, MONOBASIC, MONOHYDRATE AND SODIUM PHOSPHATE, DIBASIC, ANHYDROUS 10.17 MMOL: 142; 276 INJECTION, SOLUTION INTRAVENOUS at 12:26

## 2024-05-30 RX ADMIN — SODIUM CHLORIDE, PRESERVATIVE FREE 40 MG: 5 INJECTION INTRAVENOUS at 09:15

## 2024-05-30 RX ADMIN — POTASSIUM CHLORIDE 40 MEQ: 1500 TABLET, EXTENDED RELEASE ORAL at 09:14

## 2024-05-30 NOTE — PROGRESS NOTES
05/30/24 1017   Encounter Summary   Encounter Overview/Reason Spiritual/Emotional Needs   Service Provided For Patient   Referral/Consult From [facility] [place]   Support System Children   Last Encounter  05/30/24  (CW)   Complexity of Encounter Moderate   Spiritual/Emotional needs   Type Spiritual Support   Assessment/Intervention/Outcome   Assessment Calm   Intervention Active listening;Discussed illness injury and it’s impact;Explored/Affirmed feelings, thoughts, concerns;Prayer (assurance of)/[place];Read/Provided Scripture   Outcome Comfort;Engaged in conversation;Expressed Gratitude;Peace;Receptive      visited patient. Patient was laying in bed. Patient welcomed . offered a listening presence and offered prayer. Spiritual care is ongoing as needed.     Phil Luna  [phone]

## 2024-05-30 NOTE — DISCHARGE INSTRUCTIONS
Your information:  Name: Erin Reyna  : 1940    Your instructions:  Discharge to [facility]    What to do after you leave the hospital:    Recommended diet: Dysphagia - Soft and Bite Sized     Recommended activity: activity as tolerated    The following personal items were collected during your admission and were returned to you:    Belongings  Dental Appliances: Uppers, Lowers  Vision - Corrective Lenses: None  Hearing Aid: None  Clothing: Pants, Shirt, Socks  Jewelry: None  Body Piercings Removed: No  Electronic Devices: None  Weapons (Notify Protective Services/Security): None  Other Valuables: Other (Comment) (none)  Home Medications: None  Valuables Given To: Patient  Provide Name(s) of Who Valuable(s) Were Given To: n/a  Responsible person(s) in the waiting room: n/a  Patient approves for provider to speak to responsible person post operatively: Yes    Information obtained by:  By signing below, I understand that if any problems occur once I leave the hospital I am to contact PCP.  I understand and acknowledge receipt of the instructions indicated above.   When should you call for help?   Call 911  anytime you think you may need emergency care. For example, call if:    Food or something sharp is stuck in your esophagus and you can't swallow at all.   Call your doctor now or seek immediate medical care if:    You have new or worse belly pain.     You are vomiting.   Watch closely for changes in your health, and be sure to contact your doctor if:    You have new or worse symptoms of reflux.     You have any pain or trouble swallowing.     You are losing weight.     You do not get better as expected.

## 2024-05-30 NOTE — CARE COORDINATION
5/30/2024: SS NOTE  Contacted [facility], admissions for [facility] confirming pt's return for today, will accept with a pending pre cert but request a afternoon transfer, PAS scheduled for 2:30pm, pt's daughter, Kathy and nursing notified of transfer arrangements. JORDY completed.Electronically signed by PHILIP Engel on 5/30/2024 at 9:34 AM

## 2024-05-30 NOTE — PROGRESS NOTES
PROGRESS NOTE  By Martín Desai M.D.    [facility]  Dr. Saul Leyva M.D.,  Dr. Galo Agosto M.D.,   Dr. Myron Mart D.O.,  Dr. Catarino Hinojosa M.D.,  Dr. Atif Jorgensen D.O.,          Erin Reyna  84 y.o.  female    SUBJECTIVE:  No new complaints.  States that he was able to eat without significant pain of dysphagia    OBJECTIVE:    /78   Pulse 86   Temp 98.2 °F (36.8 °C) (Oral)   Resp 16   Ht 1.626 m (5' 4\")   Wt 63.5 kg (140 lb)   SpO2 96%   BMI 24.03 kg/m²     General: NAD/older adult  female  HEENT: Anicteric sclera/moist oral mucosa  Neck: Supple/trachea midline  Chest: Symmetric excursion/nonlabored respirations  Cor: Regular  Abd.: Soft/nontender and nondistended  Extr.:  Decreased muscle tone and bulk throughout.  No significant peripheral edema  Skin: Warm and dry/anicteric      DATA:    MBSS images reviewed/Report read:  Swallowing mechanism grossly within normal limits without evidence of  aspiration.           Lab Results   Component Value Date/Time    WBC 5.8 05/30/2024 05:05 AM    RBC 4.72 05/30/2024 05:05 AM    HGB 14.5 05/30/2024 05:05 AM    HCT 43.1 05/30/2024 05:05 AM    MCV 91.3 05/30/2024 05:05 AM    MCH 30.7 05/30/2024 05:05 AM    MCHC 33.6 05/30/2024 05:05 AM    RDW 13.2 05/30/2024 05:05 AM     05/30/2024 05:05 AM    MPV 9.3 05/30/2024 05:05 AM     Lab Results   Component Value Date/Time     05/30/2024 05:05 AM    K 3.3 05/30/2024 05:05 AM     05/30/2024 05:05 AM    CO2 23 05/30/2024 05:05 AM    BUN 3 05/30/2024 05:05 AM    CREATININE 0.7 05/30/2024 05:05 AM    CALCIUM 8.5 05/30/2024 05:05 AM    BILITOT 0.5 05/30/2024 05:05 AM    ALKPHOS 83 05/30/2024 05:05 AM    AST 20 05/30/2024 05:05 AM    ALT 10 05/30/2024 05:05 AM     No results found for: \"LIPASE\"  No results found for: \"AMYLASE\"      ASSESSMENT/PLAN:  Patient Active Problem List   Diagnosis    Bilateral carpal tunnel syndrome    Elevated hemoglobin (HCC)    Right

## 2024-05-30 NOTE — PROGRESS NOTES
CHIEF COMPLAINT: Pre-procedural clearance.    HISTORY OF PRESENT ILLNESS: Patient is a 84 y.o. female seen at the request of Unruly Smith MD and not followed by cardiology.    Presented with painful swallowing. Recommended for EGD.     Cardiology consulted for pre-procedural risk stratification.     No overt angina or SOB.     Limited mobility due to prior CVA.     Past Medical History:   Diagnosis Date    Carpal tunnel syndrome, bilateral     L hand OR 7-2-19     DDD (degenerative disc disease), cervical     Elevated hemoglobin (HCC) 8/6/2018    Lumbar radiculopathy     Osteoarthritis        Patient Active Problem List   Diagnosis    Bilateral carpal tunnel syndrome    Elevated hemoglobin (HCC)    Right carpal tunnel syndrome    DDD (degenerative disc disease), lumbar    Spondylolisthesis, lumbar region    Spinal stenosis of lumbar region    Lumbosacral spondylosis without myelopathy    Lumbar radiculopathy    Odynophagia    Esophagitis       Allergies   Allergen Reactions    Hydrocodone-Acetaminophen Itching and Nausea Only       Current Facility-Administered Medications   Medication Dose Route Frequency Provider Last Rate Last Admin    dextrose 5 % and 0.45 % NaCl with KCl 20 mEq infusion   IntraVENous Continuous Tom Ma APRN -  mL/hr at 05/29/24 2055 New Bag at 05/29/24 2055    fluconazole (DIFLUCAN) in 0.9 % sodium chloride IVPB 200 mg  200 mg IntraVENous Daily Naresh Moreno  mL/hr at 05/29/24 1159 200 mg at 05/29/24 1159    Vitamin D (CHOLECALCIFEROL) tablet 2,000 Units  2,000 Units Oral Daily Naresh Moreon DO   2,000 Units at 05/29/24 1155    nystatin (MYCOSTATIN) 752444 UNIT/ML suspension 500,000 Units  5 mL Oral 4x Daily Naresh Moreno DO   500,000 Units at 05/29/24 2323    cefTRIAXone (ROCEPHIN) 1,000 mg in sterile water 10 mL IV syringe  1,000 mg IntraVENous Q24H Naresh Moreno DO   1,000 mg at 05/30/24 0639    Benzocaine-Menthol (CEPACOL) 1 lozenge  1 lozenge Oral Q2H PRN Black,

## 2024-05-31 NOTE — DISCHARGE SUMMARY
[place] [id number]                            DISCHARGE SUMMARY      PATIENT NAME: RONALD MCGARRY         : 1940  MED REC NO: [id number]                        ROOM: 0338  ACCOUNT NO: [id number]                       ADMIT DATE: 2024  PROVIDER: Emmanuel Wong DO      ADMITTING DIAGNOSIS:  Odynophagia with radiographic evidence of esophagitis with status post esophagogastroduodenoscopy with dilatation and biopsy by Dr. Mart on May 28th.    SECONDARY DISCHARGE DIAGNOSES:    1. Severe protein-caloric malnutrition with dehydration in the setting of minimal oral intake.  2. Previous history of cerebrovascular accident.  3. Vitamin D deficiency.  4. Elevated troponin, possibly secondary to demand ischemia.  5. Degenerative joint disease.    COMPLICATIONS:  None.    OPERATIONS PERFORMED:  Esophagogastroduodenoscopy with biopsy and dilatation by Dr. Mart on May 28th.    CONSULTATIONS OBTAINED:  With GI and Cardiology.  No OMT was given.    ADDITIONAL ADMITTING PHYSICIAN:  Dr. Moreno.    CHIEF COMPLAINT AND HISTORY OF CHIEF COMPLAINT:  This is an 84-year-old white female, who was admitted to [facility].  The patient presented to the hospital here on May 26, 2024.  The patient presented to the emergency room from the nursing home.  Apparently, the patient presented here with what appeared to be odynophagia.  The patient states over the past several days, the patient has been having painful swallowing that got progressively worse.  The patient has had very little oral intake.  The patient presented to the hospital here, at which time a CAT scan of the chest did not show any pathology.  She was admitted to the hospital under the service of Dr. Wong and Dr. Moreno.  The patient was seen in the emergency room.    PAST MEDICAL HISTORY:  Positive for history of nerve blocks, appendectomy, carpal tunnel  128/78, pulse 86, respirations 16, O2 saturation 96%.  Height 5 feet 6 inches, weight 140 pounds.  The patient was discharged on Voltaren Gel 1% q.6 hours p.r.n. to the affected area, Diflucan was given 100 mg daily for 5 days, nystatin swish and swallow 50,000 units 4 times a day, and vitamin D 2000 units daily.  The patient will also continue folic acid 1 mg daily and gabapentin 600 mg 3 times a day, which she had been taking at home.  The patient was recommended to avoid nonsteroidal anti-inflammatory agents.  The patient was discharged on a diet as tolerated along with appropriate followup accordingly.  The patient was tolerating her diet without complication.  At the time of discharge, more than 40 minutes was spent on the day of discharge, more than 50% of time spent face-to-face with the patient discussing plan of care and treatment at this time.  The patient overall was clinically improved at this time with followup accordingly.          JAN RAE DO      D:  05/30/2024 16:09:50     T:  05/30/2024 16:59:26     DESIREE/PRASAD  Job #:  [id number]     Doc#:  [id number]

## 2024-06-06 LAB — SURGICAL PATHOLOGY REPORT: NORMAL

## 2024-06-06 NOTE — PROGRESS NOTES
Physician Progress Note      PATIENT:               RONALD MCGARRY  [facility] #:                  [id number]  :                       1940  ADMIT DATE:       2024 8:49 AM  DISCH DATE:        2024 4:31 PM  RESPONDING  PROVIDER #:        Emmanuel Del Rio DO          QUERY TEXT:    Patient admitted with GERD with Esophagitis. Noted documentation of Severe   protein-caloric malnutrition in DS on . In order to support the diagnosis   of Severe protein-caloric malnutrition, please include additional clinical   indicators in your documentation.  Or please document if the diagnosis of   Severe protein-caloric malnutrition has been ruled out after further study.  [Severe protein-caloric malnutrition was ruled out::Severe protein-caloric   malnutrition was ruled out after study.]]    The medical record reflects the following:  Risk Factors: GERD, Esophagitis  Clinical Indicators: DS  Severe protein-caloric malnutrition with   dehydration in the setting of minimal oral intake. IM PN  She continues   to have a minimal appetite with decreased oral intake.  BMI 24.03 kg/m?  Albumin 3.3>>3.2>>3.2>>3.0  Treatment: clear liquid diet as tolerated, IV hydration therapy.    Thank you,  Radha Spear, [facility], S.  Options provided:  -- Severe protein-caloric malnutrition present as evidenced by, Please   document evidence.  -- Other - I will add my own diagnosis  -- Disagree - Not applicable / Not valid  -- Disagree - Clinically unable to determine / Unknown  -- Refer to Clinical Documentation Reviewer    PROVIDER RESPONSE TEXT:    Severe protein-caloric malnutrition is present as evidenced by lab findings    Query created by: Radha Spear on 6/3/2024 1:49 AM      Electronically signed by:  Emmanuel Del Rio DO 2024 2:31 PM

## 2024-07-04 LAB
ECHO BSA: 1.69 M2
STRESS TARGET HR: 136 BPM

## 2024-07-25 ENCOUNTER — APPOINTMENT (OUTPATIENT)
Dept: CT IMAGING | Age: 84
End: 2024-07-25
Payer: COMMERCIAL

## 2024-07-25 ENCOUNTER — HOSPITAL ENCOUNTER (EMERGENCY)
Age: 84
Discharge: HOME | End: 2024-07-25
Attending: EMERGENCY MEDICINE
Payer: COMMERCIAL

## 2024-07-25 VITALS
RESPIRATION RATE: 18 BRPM | HEART RATE: 85 BPM | SYSTOLIC BLOOD PRESSURE: 107 MMHG | OXYGEN SATURATION: 92 % | TEMPERATURE: 97.7 F | DIASTOLIC BLOOD PRESSURE: 78 MMHG

## 2024-07-25 DIAGNOSIS — N28.9 ACUTE RENAL INSUFFICIENCY: ICD-10-CM

## 2024-07-25 DIAGNOSIS — E86.0 DEHYDRATION: ICD-10-CM

## 2024-07-25 DIAGNOSIS — R13.10 DYSPHAGIA, UNSPECIFIED TYPE: Primary | ICD-10-CM

## 2024-07-25 DIAGNOSIS — E87.6 HYPOKALEMIA: ICD-10-CM

## 2024-07-25 LAB
ALBUMIN SERPL-MCNC: 4.3 G/DL (ref 3.5–5.2)
ALP SERPL-CCNC: 115 U/L (ref 35–104)
ALT SERPL-CCNC: 10 U/L (ref 0–32)
ANION GAP SERPL CALCULATED.3IONS-SCNC: 25 MMOL/L (ref 7–16)
AST SERPL-CCNC: 20 U/L (ref 0–31)
BASOPHILS # BLD: 0.03 K/UL (ref 0–0.2)
BASOPHILS NFR BLD: 0 % (ref 0–2)
BILIRUB SERPL-MCNC: 0.8 MG/DL (ref 0–1.2)
BUN SERPL-MCNC: 27 MG/DL (ref 6–23)
CALCIUM SERPL-MCNC: 10.1 MG/DL (ref 8.6–10.2)
CHLORIDE SERPL-SCNC: 98 MMOL/L (ref 98–107)
CO2 SERPL-SCNC: 24 MMOL/L (ref 22–29)
CREAT SERPL-MCNC: 1.2 MG/DL (ref 0.5–1)
EOSINOPHIL # BLD: 0.01 K/UL (ref 0.05–0.5)
EOSINOPHILS RELATIVE PERCENT: 0 % (ref 0–6)
ERYTHROCYTE [DISTWIDTH] IN BLOOD BY AUTOMATED COUNT: 13.9 % (ref 11.5–15)
GFR, ESTIMATED: 46 ML/MIN/1.73M2
GLUCOSE SERPL-MCNC: 115 MG/DL (ref 74–99)
HCT VFR BLD AUTO: 46.5 % (ref 34–48)
HGB BLD-MCNC: 16 G/DL (ref 11.5–15.5)
IMM GRANULOCYTES # BLD AUTO: 0.03 K/UL (ref 0–0.58)
IMM GRANULOCYTES NFR BLD: 0 % (ref 0–5)
LIPASE SERPL-CCNC: 17 U/L (ref 13–60)
LYMPHOCYTES NFR BLD: 1.15 K/UL (ref 1.5–4)
LYMPHOCYTES RELATIVE PERCENT: 14 % (ref 20–42)
MCH RBC QN AUTO: 31.6 PG (ref 26–35)
MCHC RBC AUTO-ENTMCNC: 34.4 G/DL (ref 32–34.5)
MCV RBC AUTO: 91.7 FL (ref 80–99.9)
MONOCYTES NFR BLD: 0.49 K/UL (ref 0.1–0.95)
MONOCYTES NFR BLD: 6 % (ref 2–12)
NEUTROPHILS NFR BLD: 79 % (ref 43–80)
NEUTS SEG NFR BLD: 6.58 K/UL (ref 1.8–7.3)
PLATELET # BLD AUTO: 197 K/UL (ref 130–450)
PMV BLD AUTO: 9.8 FL (ref 7–12)
POTASSIUM SERPL-SCNC: 3 MMOL/L (ref 3.5–5)
PROT SERPL-MCNC: 7.3 G/DL (ref 6.4–8.3)
RBC # BLD AUTO: 5.07 M/UL (ref 3.5–5.5)
SODIUM SERPL-SCNC: 147 MMOL/L (ref 132–146)
SPECIMEN SOURCE: NORMAL
STREP A, MOLECULAR: NEGATIVE
WBC OTHER # BLD: 8.3 K/UL (ref 4.5–11.5)

## 2024-07-25 PROCEDURE — 70491 CT SOFT TISSUE NECK W/DYE: CPT

## 2024-07-25 PROCEDURE — 96361 HYDRATE IV INFUSION ADD-ON: CPT

## 2024-07-25 PROCEDURE — 6360000004 HC RX CONTRAST MEDICATION: Performed by: RADIOLOGY

## 2024-07-25 PROCEDURE — 85025 COMPLETE CBC W/AUTO DIFF WBC: CPT

## 2024-07-25 PROCEDURE — 6370000000 HC RX 637 (ALT 250 FOR IP): Performed by: EMERGENCY MEDICINE

## 2024-07-25 PROCEDURE — 80053 COMPREHEN METABOLIC PANEL: CPT

## 2024-07-25 PROCEDURE — 83690 ASSAY OF LIPASE: CPT

## 2024-07-25 PROCEDURE — 87651 STREP A DNA AMP PROBE: CPT

## 2024-07-25 PROCEDURE — 2580000003 HC RX 258: Performed by: EMERGENCY MEDICINE

## 2024-07-25 PROCEDURE — 99285 EMERGENCY DEPT VISIT HI MDM: CPT

## 2024-07-25 PROCEDURE — 96360 HYDRATION IV INFUSION INIT: CPT

## 2024-07-25 RX ORDER — 0.9 % SODIUM CHLORIDE 0.9 %
1000 INTRAVENOUS SOLUTION INTRAVENOUS ONCE
Status: COMPLETED | OUTPATIENT
Start: 2024-07-25 | End: 2024-07-25

## 2024-07-25 RX ORDER — LIDOCAINE HYDROCHLORIDE 20 MG/ML
15 SOLUTION OROPHARYNGEAL PRN
Qty: 100 ML | Refills: 0 | Status: SHIPPED | OUTPATIENT
Start: 2024-07-25

## 2024-07-25 RX ADMIN — IOPAMIDOL 70 ML: 755 INJECTION, SOLUTION INTRAVENOUS at 11:42

## 2024-07-25 RX ADMIN — ALUMINUM HYDROXIDE, MAGNESIUM HYDROXIDE, AND SIMETHICONE: 1200; 120; 1200 SUSPENSION ORAL at 10:16

## 2024-07-25 RX ADMIN — SODIUM CHLORIDE 1000 ML: 9 INJECTION, SOLUTION INTRAVENOUS at 10:06

## 2024-07-25 NOTE — ED PROVIDER NOTES
[facility] EMERGENCY DEPARTMENT  EMERGENCY DEPARTMENT ENCOUNTER        Pt Name: Erin Reyna  MRN: [id number]  Birthdate 1940  Date of evaluation: 7/25/2024  Provider: Ayaan Cano DO  PCP: Unruly Smith MD  Note Started: 9:50 AM EDT 7/25/24    CHIEF COMPLAINT       Chief Complaint   Patient presents with    unable to eat or drink      From [facility] not able to eat or drink x2 days        HISTORY OF PRESENT ILLNESS: 1 or more Elements   History From: patient    Limitations to history : None    Erin Reyna is a 84 y.o. female who presents to the ED for evaluation of painful swallowing.  Patient states that when she swallows she has pain in her throat as well as in her upper abdomen.  Denies any chest pain or shortness of breath.  Denies dizziness or lightheadedness.  No nausea or vomiting.  No diarrhea.  This has been ongoing for the past 48 hours and she is refusing to take her medications due to the painful swallowing.  Patient states that she has not been given anything for pain.  States that it is even painful to swallow fluids and therefore has not drink anything.  Patient is DNR CC.    Nursing Notes were all reviewed and agreed with or any disagreements were addressed in the HPI.      REVIEW OF EXTERNAL NOTE :        REVIEW OF SYSTEMS :           Positives and Pertinent negatives as per HPI.     SURGICAL HISTORY     Past Surgical History:   Procedure Laterality Date    ANESTHESIA NERVE BLOCK Bilateral 12/10/2019    RIGHT L5 AND LEFT L4 TRANSFORAMINAL LUMBAR EPIDURAL UNDER FLUOROSCOPY (CPT 71753) performed by Modesto Pearson MD at [facility] OR    APPENDECTOMY      CARPAL TUNNEL RELEASE Right 6/14/2019    RIGHT ENDOSCOPIC CARPAL TUNNEL RELEASE performed by Santana Alvarado MD at [facility] OR    CARPAL TUNNEL RELEASE Left 7/2/2019    LEFT ENDOSCOPIC CARPAL TUNNEL RELEASE performed by Santana Alvarado MD at [facility] OR    CATARACT REMOVAL WITH IMPLANT  No

## 2024-08-11 ENCOUNTER — HOSPITAL ENCOUNTER (INPATIENT)
Age: 84
LOS: 4 days | Discharge: NURSING, REHAB OR LONG-TERM CARE | End: 2024-08-15
Attending: EMERGENCY MEDICINE | Admitting: INTERNAL MEDICINE
Payer: COMMERCIAL

## 2024-08-11 ENCOUNTER — APPOINTMENT (OUTPATIENT)
Dept: GENERAL RADIOLOGY | Age: 84
End: 2024-08-11
Payer: COMMERCIAL

## 2024-08-11 DIAGNOSIS — E87.6 HYPOKALEMIA: ICD-10-CM

## 2024-08-11 DIAGNOSIS — R13.10 DYSPHAGIA, UNSPECIFIED TYPE: ICD-10-CM

## 2024-08-11 DIAGNOSIS — J02.9 SORE THROAT: ICD-10-CM

## 2024-08-11 DIAGNOSIS — K20.90 ESOPHAGITIS: Primary | ICD-10-CM

## 2024-08-11 LAB
ALBUMIN SERPL-MCNC: 3.6 G/DL (ref 3.5–5.2)
ALP SERPL-CCNC: 93 U/L (ref 35–104)
ALT SERPL-CCNC: 6 U/L (ref 0–32)
ANION GAP SERPL CALCULATED.3IONS-SCNC: 12 MMOL/L (ref 7–16)
AST SERPL-CCNC: 14 U/L (ref 0–31)
BASOPHILS # BLD: 0.04 K/UL (ref 0–0.2)
BASOPHILS NFR BLD: 1 % (ref 0–2)
BILIRUB SERPL-MCNC: 0.7 MG/DL (ref 0–1.2)
BUN SERPL-MCNC: 18 MG/DL (ref 6–23)
CALCIUM SERPL-MCNC: 9.3 MG/DL (ref 8.6–10.2)
CHLORIDE SERPL-SCNC: 96 MMOL/L (ref 98–107)
CO2 SERPL-SCNC: 34 MMOL/L (ref 22–29)
CREAT SERPL-MCNC: 1.1 MG/DL (ref 0.5–1)
EOSINOPHIL # BLD: 0.18 K/UL (ref 0.05–0.5)
EOSINOPHILS RELATIVE PERCENT: 2 % (ref 0–6)
ERYTHROCYTE [DISTWIDTH] IN BLOOD BY AUTOMATED COUNT: 14.1 % (ref 11.5–15)
GFR, ESTIMATED: 51 ML/MIN/1.73M2
GLUCOSE SERPL-MCNC: 171 MG/DL (ref 74–99)
HCT VFR BLD AUTO: 43.8 % (ref 34–48)
HGB BLD-MCNC: 14.6 G/DL (ref 11.5–15.5)
IMM GRANULOCYTES # BLD AUTO: 0.03 K/UL (ref 0–0.58)
IMM GRANULOCYTES NFR BLD: 0 % (ref 0–5)
INFLUENZA A BY PCR: NOT DETECTED
INFLUENZA B BY PCR: NOT DETECTED
LYMPHOCYTES NFR BLD: 1.31 K/UL (ref 1.5–4)
LYMPHOCYTES RELATIVE PERCENT: 15 % (ref 20–42)
MCH RBC QN AUTO: 32.4 PG (ref 26–35)
MCHC RBC AUTO-ENTMCNC: 33.3 G/DL (ref 32–34.5)
MCV RBC AUTO: 97.3 FL (ref 80–99.9)
MONOCYTES NFR BLD: 0.73 K/UL (ref 0.1–0.95)
MONOCYTES NFR BLD: 9 % (ref 2–12)
NEUTROPHILS NFR BLD: 73 % (ref 43–80)
NEUTS SEG NFR BLD: 6.26 K/UL (ref 1.8–7.3)
PLATELET # BLD AUTO: 177 K/UL (ref 130–450)
PMV BLD AUTO: 10.2 FL (ref 7–12)
POTASSIUM SERPL-SCNC: 3.1 MMOL/L (ref 3.5–5)
PROCALCITONIN SERPL-MCNC: <0.02 NG/ML (ref 0–0.08)
PROT SERPL-MCNC: 6.7 G/DL (ref 6.4–8.3)
RBC # BLD AUTO: 4.5 M/UL (ref 3.5–5.5)
SARS-COV-2 RDRP RESP QL NAA+PROBE: NOT DETECTED
SODIUM SERPL-SCNC: 142 MMOL/L (ref 132–146)
SPECIMEN DESCRIPTION: NORMAL
WBC OTHER # BLD: 8.6 K/UL (ref 4.5–11.5)

## 2024-08-11 PROCEDURE — 6360000002 HC RX W HCPCS

## 2024-08-11 PROCEDURE — 84145 PROCALCITONIN (PCT): CPT

## 2024-08-11 PROCEDURE — 85025 COMPLETE CBC W/AUTO DIFF WBC: CPT

## 2024-08-11 PROCEDURE — 99285 EMERGENCY DEPT VISIT HI MDM: CPT

## 2024-08-11 PROCEDURE — 87502 INFLUENZA DNA AMP PROBE: CPT

## 2024-08-11 PROCEDURE — 87635 SARS-COV-2 COVID-19 AMP PRB: CPT

## 2024-08-11 PROCEDURE — 6370000000 HC RX 637 (ALT 250 FOR IP)

## 2024-08-11 PROCEDURE — 71045 X-RAY EXAM CHEST 1 VIEW: CPT

## 2024-08-11 PROCEDURE — 80053 COMPREHEN METABOLIC PANEL: CPT

## 2024-08-11 PROCEDURE — 1200000000 HC SEMI PRIVATE

## 2024-08-11 RX ORDER — ACETAMINOPHEN 325 MG/1
650 TABLET ORAL EVERY 4 HOURS PRN
COMMUNITY

## 2024-08-11 RX ORDER — ISOSORBIDE MONONITRATE 30 MG/1
30 TABLET, EXTENDED RELEASE ORAL DAILY
COMMUNITY

## 2024-08-11 RX ORDER — LIDOCAINE HYDROCHLORIDE 20 MG/ML
15 SOLUTION OROPHARYNGEAL EVERY 4 HOURS PRN
COMMUNITY

## 2024-08-11 RX ORDER — GABAPENTIN 600 MG/1
600 TABLET ORAL 3 TIMES DAILY
COMMUNITY

## 2024-08-11 RX ORDER — PANTOPRAZOLE SODIUM 40 MG/1
TABLET, DELAYED RELEASE ORAL
Status: COMPLETED
Start: 2024-08-11 | End: 2024-08-11

## 2024-08-11 RX ORDER — GENTAMICIN SULFATE 1 MG/G
1 OINTMENT TOPICAL 2 TIMES DAILY
COMMUNITY

## 2024-08-11 RX ORDER — SUCRALFATE 1 G/1
1 TABLET ORAL ONCE
Status: COMPLETED | OUTPATIENT
Start: 2024-08-11 | End: 2024-08-11

## 2024-08-11 RX ORDER — CLINDAMYCIN HYDROCHLORIDE 300 MG/1
300 CAPSULE ORAL 3 TIMES DAILY
Status: ON HOLD | COMMUNITY
Start: 2024-08-08 | End: 2024-08-13 | Stop reason: HOSPADM

## 2024-08-11 RX ORDER — POTASSIUM CHLORIDE 7.45 MG/ML
10 INJECTION INTRAVENOUS ONCE
Status: COMPLETED | OUTPATIENT
Start: 2024-08-11 | End: 2024-08-11

## 2024-08-11 RX ORDER — PANTOPRAZOLE SODIUM 40 MG/1
40 TABLET, DELAYED RELEASE ORAL
Status: DISCONTINUED | OUTPATIENT
Start: 2024-08-12 | End: 2024-08-12 | Stop reason: SDUPTHER

## 2024-08-11 RX ORDER — COLCHICINE 0.6 MG/1
0.6 TABLET ORAL DAILY
COMMUNITY

## 2024-08-11 RX ORDER — ACETAMINOPHEN 325 MG/1
650 TABLET ORAL EVERY 6 HOURS PRN
COMMUNITY

## 2024-08-11 RX ORDER — POLYETHYLENE GLYCOL 3350 17 G/17G
17 POWDER, FOR SOLUTION ORAL DAILY
COMMUNITY

## 2024-08-11 RX ORDER — ONDANSETRON 4 MG/1
4 TABLET, FILM COATED ORAL EVERY 6 HOURS PRN
COMMUNITY

## 2024-08-11 RX ORDER — MEGESTROL ACETATE 40 MG/ML
800 SUSPENSION ORAL DAILY
COMMUNITY

## 2024-08-11 RX ORDER — METOPROLOL SUCCINATE 25 MG/1
25 TABLET, EXTENDED RELEASE ORAL DAILY
COMMUNITY

## 2024-08-11 RX ORDER — FUROSEMIDE 40 MG/1
40 TABLET ORAL DAILY
COMMUNITY

## 2024-08-11 RX ORDER — ROSUVASTATIN CALCIUM 20 MG/1
20 TABLET, COATED ORAL DAILY
COMMUNITY

## 2024-08-11 RX ORDER — FOLIC ACID 1 MG/1
2 TABLET ORAL DAILY
COMMUNITY

## 2024-08-11 RX ADMIN — ALUMINUM HYDROXIDE, MAGNESIUM HYDROXIDE, AND SIMETHICONE: 1200; 120; 1200 SUSPENSION ORAL at 13:34

## 2024-08-11 RX ADMIN — SUCRALFATE 1 G: 1 TABLET ORAL at 13:34

## 2024-08-11 RX ADMIN — PANTOPRAZOLE SODIUM 40 MG: 40 TABLET, DELAYED RELEASE ORAL at 13:35

## 2024-08-11 RX ADMIN — POTASSIUM CHLORIDE 10 MEQ: 7.46 INJECTION, SOLUTION INTRAVENOUS at 15:51

## 2024-08-11 ASSESSMENT — LIFESTYLE VARIABLES
HOW OFTEN DO YOU HAVE A DRINK CONTAINING ALCOHOL: NEVER
HOW MANY STANDARD DRINKS CONTAINING ALCOHOL DO YOU HAVE ON A TYPICAL DAY: PATIENT DOES NOT DRINK

## 2024-08-11 NOTE — ED PROVIDER NOTES
[facility]  Department of Emergency Medicine   EM Physician ASHER&Erin Kirkpatrick 84 y.o. female PMHx of esophagitis, DDD, DNR CC, resident at [facility] presents to the ED c/o sore throat.  Patient was evaluated in the ER similar complaint 2 weeks ago.  Patient had x-ray imaging and then given lidocaine.  Daughter reports she went back to nursing home has been eating and gained approximately 2 mounds.  This morning the patient had told nursing home staff that she was having sore throat again.  Decided to bring her to the ER for evaluation.  There was discussion with GI about possibly a PEG tube to help with her feeding and nutrition.  They had the time held off, family did not want pursue anything aggressive at that time.  Patient also had findings on with GI on esophagitis and strictures of the esophagus with closing.  She had a dilation done.  Daughter reports that she is on Protonix and medicine to help increase her appetite.  Patient herself is a very poor historian he denies any systemic symptoms.         Review of Systems   Unable to perform ROS: Dementia        Physical Exam  Constitutional:       Appearance: She is not ill-appearing.   HENT:      Head: Normocephalic and atraumatic.      Right Ear: Ear canal normal.      Left Ear: Ear canal normal.      Mouth/Throat:      Mouth: Mucous membranes are moist.   Cardiovascular:      Rate and Rhythm: Normal rate and regular rhythm.   Pulmonary:      Effort: Pulmonary effort is normal. No respiratory distress.      Breath sounds: No stridor. Rhonchi present.   Abdominal:      Palpations: Abdomen is soft.   Skin:     General: Skin is warm and dry.      Capillary Refill: Capillary refill takes less than 2 seconds.   Neurological:      General: No focal deficit present.      Mental Status: She is alert and oriented to person, place, and time.          Procedures     Medical Decision Making  84-year-old female presents the ER  steroid injection (Bilateral, 9/3/2019); Nerve Block (Right, 12/10/2019); Anesthesia Nerve Block (Bilateral, 12/10/2019); Nerve Block (Right, 05/26/2020); Pain management procedure (Bilateral, 5/26/2020); Upper gastrointestinal endoscopy (N/A, 5/28/2024); and Upper gastrointestinal endoscopy (5/28/2024).    Social History:  reports that she has been smoking cigarettes. She started smoking about 69 years ago. She has a 69.1 pack-year smoking history. She has never used smokeless tobacco. She reports that she does not drink alcohol and does not use drugs.    Family History: family history includes Cancer in her father.     The patient’s home medications have been reviewed.    Allergies: Hydrocodone-acetaminophen    -------------------------------------------------- RESULTS -------------------------------------------------    LABS:  Results for orders placed or performed during the hospital encounter of 08/11/24   COVID-19, Rapid    Specimen: Nasopharyngeal Swab   Result Value Ref Range    Specimen Description .NASOPHARYNGEAL SWAB     SARS-CoV-2, Rapid Not Detected Not Detected   Influenza A+B, PCR    Specimen: Nasal   Result Value Ref Range    Influenza A by PCR Not Detected Not Detected    Influenza B by PCR Not Detected Not Detected   CMP   Result Value Ref Range    Sodium 142 132 - 146 mmol/L    Potassium 3.1 (L) 3.5 - 5.0 mmol/L    Chloride 96 (L) 98 - 107 mmol/L    CO2 34 (H) 22 - 29 mmol/L    Anion Gap 12 7 - 16 mmol/L    Glucose 171 (H) 74 - 99 mg/dL    BUN 18 6 - 23 mg/dL    Creatinine 1.1 (H) 0.50 - 1.00 mg/dL    Est, Glom Filt Rate 51 (L) >60 mL/min/1.73m2    Calcium 9.3 8.6 - 10.2 mg/dL    Total Protein 6.7 6.4 - 8.3 g/dL    Albumin 3.6 3.5 - 5.2 g/dL    Total Bilirubin 0.7 0.0 - 1.2 mg/dL    Alkaline Phosphatase 93 35 - 104 U/L    ALT 6 0 - 32 U/L    AST 14 0 - 31 U/L   CBC with Auto Differential   Result Value Ref Range    WBC 8.6 4.5 - 11.5 k/uL    RBC 4.50 3.50 - 5.50 m/uL    Hemoglobin 14.6 11.5 - 15.5

## 2024-08-11 NOTE — H&P
Department of Internal Medicine  History and Physical    PCP: Dr. Unruly Smith  Admitting Physician: Dr. Unruly Smith  Consultants: Dr. Myron Mart      CHIEF COMPLAINT:  sore throat, poor appetite    HISTORY OF PRESENT ILLNESS:    This is an 84 year old female patient that currently resides in a nursing home facility.  She has history of dementia and is a poor historian.  She was brought in with reports of difficulty eating and sore throat secondary to known esophagitis and esophageal strictures.  The chest xray done today is unremarkable.  The laboratory studies show a depleted potassium level of 3.1.  At this time she is being admitted for evaluation and treatment.    PAST MEDICAL Hx:  Past Medical History:   Diagnosis Date    Carpal tunnel syndrome, bilateral     L hand OR 7-2-19     DDD (degenerative disc disease), cervical     Elevated hemoglobin (HCC) 8/6/2018    Lumbar radiculopathy     Osteoarthritis        PAST SURGICAL Hx:   Past Surgical History:   Procedure Laterality Date    ANESTHESIA NERVE BLOCK Bilateral 12/10/2019    RIGHT L5 AND LEFT L4 TRANSFORAMINAL LUMBAR EPIDURAL UNDER FLUOROSCOPY (CPT 51642) performed by Modesto Pearson MD at [facility] OR    APPENDECTOMY      CARPAL TUNNEL RELEASE Right 6/14/2019    RIGHT ENDOSCOPIC CARPAL TUNNEL RELEASE performed by Santana Alvarado MD at [facility] OR    CARPAL TUNNEL RELEASE Left 7/2/2019    LEFT ENDOSCOPIC CARPAL TUNNEL RELEASE performed by Santana Alvarado MD at [facility] OR    CATARACT REMOVAL WITH IMPLANT Right 1 22 13    CATARACT REMOVAL WITH IMPLANT Left 07/02/13    CHOLECYSTECTOMY  1980's    open    EPIDURAL STEROID INJECTION N/A 7/9/2019    LUMBAR EPIDURAL STEROID INJECTION L4-5 UNDER FLUOROSCOPIC GUIDANCE performed by Modesto Pearson MD at [facility] OR    EPIDURAL STEROID INJECTION Bilateral 9/3/2019    ATTAMPTED BILATERAL LUMBAR TRANSFORAMINAL EPIDURAL STEROID INJECTION LEFT L4 AND RIGHT  L5 UNDER FLUOROSCOPIC GUIDANCE performed by Modesto

## 2024-08-12 ENCOUNTER — APPOINTMENT (OUTPATIENT)
Dept: GENERAL RADIOLOGY | Age: 84
End: 2024-08-12
Payer: COMMERCIAL

## 2024-08-12 ENCOUNTER — APPOINTMENT (OUTPATIENT)
Dept: CT IMAGING | Age: 84
End: 2024-08-12
Payer: COMMERCIAL

## 2024-08-12 PROBLEM — R13.10 DYSPHAGIA, IDIOPATHIC: Status: ACTIVE | Noted: 2024-08-12

## 2024-08-12 PROCEDURE — 71260 CT THORAX DX C+: CPT

## 2024-08-12 PROCEDURE — 2500000003 HC RX 250 WO HCPCS: Performed by: HOSPITALIST

## 2024-08-12 PROCEDURE — 6360000002 HC RX W HCPCS: Performed by: HOSPITALIST

## 2024-08-12 PROCEDURE — 6360000004 HC RX CONTRAST MEDICATION: Performed by: RADIOLOGY

## 2024-08-12 PROCEDURE — 6360000002 HC RX W HCPCS: Performed by: INTERNAL MEDICINE

## 2024-08-12 PROCEDURE — 6370000000 HC RX 637 (ALT 250 FOR IP): Performed by: INTERNAL MEDICINE

## 2024-08-12 PROCEDURE — 74220 X-RAY XM ESOPHAGUS 1CNTRST: CPT

## 2024-08-12 PROCEDURE — 1200000000 HC SEMI PRIVATE

## 2024-08-12 PROCEDURE — 2580000003 HC RX 258: Performed by: HOSPITALIST

## 2024-08-12 PROCEDURE — 2580000003 HC RX 258: Performed by: INTERNAL MEDICINE

## 2024-08-12 RX ORDER — ROSUVASTATIN CALCIUM 10 MG/1
20 TABLET, COATED ORAL DAILY
Status: DISCONTINUED | OUTPATIENT
Start: 2024-08-12 | End: 2024-08-15 | Stop reason: HOSPADM

## 2024-08-12 RX ORDER — ONDANSETRON 4 MG/1
4 TABLET, FILM COATED ORAL EVERY 6 HOURS PRN
Status: DISCONTINUED | OUTPATIENT
Start: 2024-08-12 | End: 2024-08-12 | Stop reason: SDUPTHER

## 2024-08-12 RX ORDER — SODIUM CHLORIDE 0.9 % (FLUSH) 0.9 %
5-40 SYRINGE (ML) INJECTION PRN
Status: DISCONTINUED | OUTPATIENT
Start: 2024-08-12 | End: 2024-08-15 | Stop reason: HOSPADM

## 2024-08-12 RX ORDER — SODIUM CHLORIDE 0.9 % (FLUSH) 0.9 %
5-40 SYRINGE (ML) INJECTION EVERY 12 HOURS SCHEDULED
Status: DISCONTINUED | OUTPATIENT
Start: 2024-08-12 | End: 2024-08-15 | Stop reason: HOSPADM

## 2024-08-12 RX ORDER — GABAPENTIN 300 MG/1
600 CAPSULE ORAL 3 TIMES DAILY
Status: DISCONTINUED | OUTPATIENT
Start: 2024-08-12 | End: 2024-08-15 | Stop reason: HOSPADM

## 2024-08-12 RX ORDER — ACETAMINOPHEN 325 MG/1
650 TABLET ORAL EVERY 6 HOURS PRN
Status: DISCONTINUED | OUTPATIENT
Start: 2024-08-12 | End: 2024-08-15 | Stop reason: HOSPADM

## 2024-08-12 RX ORDER — GABAPENTIN 600 MG/1
600 TABLET ORAL 3 TIMES DAILY
Status: DISCONTINUED | OUTPATIENT
Start: 2024-08-12 | End: 2024-08-12 | Stop reason: CLARIF

## 2024-08-12 RX ORDER — ONDANSETRON 2 MG/ML
4 INJECTION INTRAMUSCULAR; INTRAVENOUS EVERY 6 HOURS PRN
Status: DISCONTINUED | OUTPATIENT
Start: 2024-08-12 | End: 2024-08-15 | Stop reason: HOSPADM

## 2024-08-12 RX ORDER — FOLIC ACID 1 MG/1
2 TABLET ORAL DAILY
Status: DISCONTINUED | OUTPATIENT
Start: 2024-08-12 | End: 2024-08-15 | Stop reason: HOSPADM

## 2024-08-12 RX ORDER — POTASSIUM CHLORIDE 7.45 MG/ML
10 INJECTION INTRAVENOUS PRN
Status: DISCONTINUED | OUTPATIENT
Start: 2024-08-12 | End: 2024-08-15 | Stop reason: HOSPADM

## 2024-08-12 RX ORDER — PANTOPRAZOLE SODIUM 40 MG/1
40 TABLET, DELAYED RELEASE ORAL
Status: DISCONTINUED | OUTPATIENT
Start: 2024-08-13 | End: 2024-08-12

## 2024-08-12 RX ORDER — MEGESTROL ACETATE 40 MG/ML
800 SUSPENSION ORAL DAILY
Status: DISCONTINUED | OUTPATIENT
Start: 2024-08-12 | End: 2024-08-15 | Stop reason: HOSPADM

## 2024-08-12 RX ORDER — ENOXAPARIN SODIUM 100 MG/ML
40 INJECTION SUBCUTANEOUS DAILY
Status: DISCONTINUED | OUTPATIENT
Start: 2024-08-12 | End: 2024-08-15 | Stop reason: HOSPADM

## 2024-08-12 RX ORDER — ACETAMINOPHEN 650 MG/1
650 SUPPOSITORY RECTAL EVERY 6 HOURS PRN
Status: DISCONTINUED | OUTPATIENT
Start: 2024-08-12 | End: 2024-08-15 | Stop reason: HOSPADM

## 2024-08-12 RX ORDER — LIDOCAINE HYDROCHLORIDE 20 MG/ML
15 SOLUTION OROPHARYNGEAL EVERY 4 HOURS PRN
Status: DISCONTINUED | OUTPATIENT
Start: 2024-08-12 | End: 2024-08-15 | Stop reason: HOSPADM

## 2024-08-12 RX ORDER — POTASSIUM CHLORIDE 20 MEQ/1
40 TABLET, EXTENDED RELEASE ORAL PRN
Status: DISCONTINUED | OUTPATIENT
Start: 2024-08-12 | End: 2024-08-15 | Stop reason: HOSPADM

## 2024-08-12 RX ORDER — DEXTROSE MONOHYDRATE AND SODIUM CHLORIDE 5; .9 G/100ML; G/100ML
INJECTION, SOLUTION INTRAVENOUS CONTINUOUS
Status: DISCONTINUED | OUTPATIENT
Start: 2024-08-12 | End: 2024-08-15 | Stop reason: HOSPADM

## 2024-08-12 RX ORDER — ACETAMINOPHEN 325 MG/1
650 TABLET ORAL EVERY 4 HOURS PRN
Status: DISCONTINUED | OUTPATIENT
Start: 2024-08-12 | End: 2024-08-12 | Stop reason: SDUPTHER

## 2024-08-12 RX ORDER — METOPROLOL SUCCINATE 25 MG/1
25 TABLET, EXTENDED RELEASE ORAL DAILY
Status: DISCONTINUED | OUTPATIENT
Start: 2024-08-12 | End: 2024-08-15 | Stop reason: HOSPADM

## 2024-08-12 RX ORDER — COLCHICINE 0.6 MG/1
0.6 TABLET ORAL DAILY
Status: DISCONTINUED | OUTPATIENT
Start: 2024-08-12 | End: 2024-08-15 | Stop reason: HOSPADM

## 2024-08-12 RX ORDER — SODIUM CHLORIDE 9 MG/ML
INJECTION, SOLUTION INTRAVENOUS PRN
Status: DISCONTINUED | OUTPATIENT
Start: 2024-08-12 | End: 2024-08-15 | Stop reason: HOSPADM

## 2024-08-12 RX ORDER — VITAMIN B COMPLEX
2000 TABLET ORAL DAILY
Status: DISCONTINUED | OUTPATIENT
Start: 2024-08-12 | End: 2024-08-15 | Stop reason: HOSPADM

## 2024-08-12 RX ORDER — MAGNESIUM SULFATE IN WATER 40 MG/ML
2000 INJECTION, SOLUTION INTRAVENOUS PRN
Status: DISCONTINUED | OUTPATIENT
Start: 2024-08-12 | End: 2024-08-15 | Stop reason: HOSPADM

## 2024-08-12 RX ORDER — ONDANSETRON 4 MG/1
4 TABLET, ORALLY DISINTEGRATING ORAL EVERY 8 HOURS PRN
Status: DISCONTINUED | OUTPATIENT
Start: 2024-08-12 | End: 2024-08-15 | Stop reason: HOSPADM

## 2024-08-12 RX ORDER — POLYETHYLENE GLYCOL 3350 17 G/17G
17 POWDER, FOR SOLUTION ORAL DAILY
Status: DISCONTINUED | OUTPATIENT
Start: 2024-08-12 | End: 2024-08-15 | Stop reason: HOSPADM

## 2024-08-12 RX ORDER — POLYETHYLENE GLYCOL 3350 17 G/17G
17 POWDER, FOR SOLUTION ORAL DAILY PRN
Status: DISCONTINUED | OUTPATIENT
Start: 2024-08-12 | End: 2024-08-15 | Stop reason: HOSPADM

## 2024-08-12 RX ADMIN — IOPAMIDOL 75 ML: 755 INJECTION, SOLUTION INTRAVENOUS at 13:20

## 2024-08-12 RX ADMIN — SODIUM CHLORIDE, PRESERVATIVE FREE 40 MG: 5 INJECTION INTRAVENOUS at 21:05

## 2024-08-12 RX ADMIN — SODIUM CHLORIDE, PRESERVATIVE FREE 40 MG: 5 INJECTION INTRAVENOUS at 09:59

## 2024-08-12 RX ADMIN — DICLOFENAC SODIUM 2 G: 10 GEL TOPICAL at 21:06

## 2024-08-12 RX ADMIN — DEXTROSE AND SODIUM CHLORIDE: 5; 900 INJECTION, SOLUTION INTRAVENOUS at 21:16

## 2024-08-12 RX ADMIN — DICLOFENAC SODIUM 2 G: 10 GEL TOPICAL at 09:59

## 2024-08-12 RX ADMIN — SODIUM CHLORIDE, PRESERVATIVE FREE 10 ML: 5 INJECTION INTRAVENOUS at 09:30

## 2024-08-12 RX ADMIN — BARIUM SULFATE 176 G: 960 POWDER, FOR SUSPENSION ORAL at 13:44

## 2024-08-12 RX ADMIN — ENOXAPARIN SODIUM 40 MG: 100 INJECTION SUBCUTANEOUS at 09:31

## 2024-08-12 ASSESSMENT — PAIN SCALES - GENERAL
PAINLEVEL_OUTOF10: 0
PAINLEVEL_OUTOF10: 0

## 2024-08-12 NOTE — PROGRESS NOTES
4 Eyes Skin Assessment     NAME:  Erin Reyna  YOB: 1940  MEDICAL RECORD NUMBER:  [id number]    The patient is being assessed for  Admission    I agree that at least one RN has performed a thorough Head to Toe Skin Assessment on the patient. ALL assessment sites listed below have been assessed.      Areas assessed by both nurses:    Head, Face, Ears, Shoulders, Back, Chest, Arms, Elbows, Hands, Sacrum. Buttock, Coccyx, Ischium, Legs. Feet and Heels, and Under Medical Devices         Does the Patient have a Wound? No noted wound(s)       Aristeo Prevention initiated by RN: Yes  Wound Care Orders initiated by RN: No    Pressure Injury (Stage 3,4, Unstageable, DTI, NWPT, and Complex wounds) if present, place Wound referral order by RN under : No    New Ostomies, if present place, Ostomy referral order under : No     Nurse 1 eSignature: Electronically signed by Wilma Brar RN on 8/12/24 at 9:53 AM EDT    **SHARE this note so that the co-signing nurse can place an eSignature**    Nurse 2 eSignature: Electronically signed by Cynthia Erazo RN on 8/12/24 at 11:18 AM EDT

## 2024-08-12 NOTE — PLAN OF CARE
Problem: Discharge Planning  Goal: Discharge to home or other facility with appropriate resources  Outcome: Progressing  Flowsheets (Taken 8/12/2024 0903)  Discharge to home or other facility with appropriate resources: Identify barriers to discharge with patient and caregiver

## 2024-08-12 NOTE — CONSULTS
Number of Places Lived in the Last Year: 1     Unstable Housing in the Last Year: No       FamHx:  Family History   Problem Relation Age of Onset    Cancer Father        Allergy:  Allergies   Allergen Reactions    Hydrocodone-Acetaminophen Itching and Nausea Only         ROS: As described in the HPI and in addition is negative upon detailed review of systems or unobtainable unless otherwise stated in this dictation.    PE:  /77   Pulse 80   Temp 97.9 °F (36.6 °C) (Oral)   Resp 18   Wt 63.9 kg (140 lb 14 oz)   SpO2 99%   BMI 24.18 kg/m²     Gen.: NAD/older adult  female  Head: Atraumatic/normocephalic  Eyes: EOMI/anicteric sclera/no conjunctival erythema  ENT: Moist oral mucosa/poor dentition/no discharge from nose or ears  Neck: Supple with trachea midline  Chest: CTAB/symmetric excursions anteriorly  Cor: Regular/S1-S2  Abd.: Soft/nontender.  BS +/4.  No guarding  Extr.:  No significant peripheral edema.  No cyanosis or clubbing  Muscles: Decreased tone and bulk, consistent with age and condition  Skin: Warm and dry/anicteric      DATA:     Lab Results   Component Value Date/Time    WBC 8.6 08/11/2024 02:33 PM    RBC 4.50 08/11/2024 02:33 PM    HGB 14.6 08/11/2024 02:33 PM    HCT 43.8 08/11/2024 02:33 PM    MCV 97.3 08/11/2024 02:33 PM    MCH 32.4 08/11/2024 02:33 PM    MCHC 33.3 08/11/2024 02:33 PM    RDW 14.1 08/11/2024 02:33 PM     08/11/2024 02:33 PM    MPV 10.2 08/11/2024 02:33 PM     Lab Results   Component Value Date/Time     08/11/2024 02:33 PM    K 3.1 08/11/2024 02:33 PM    CL 96 08/11/2024 02:33 PM    CO2 34 08/11/2024 02:33 PM    BUN 18 08/11/2024 02:33 PM    CREATININE 1.1 08/11/2024 02:33 PM    CALCIUM 9.3 08/11/2024 02:33 PM    BILITOT 0.7 08/11/2024 02:33 PM    ALKPHOS 93 08/11/2024 02:33 PM    AST 14 08/11/2024 02:33 PM    ALT 6 08/11/2024 02:33 PM     Lab Results   Component Value Date/Time    LIPASE 17 07/25/2024 09:58 AM     No results found for:

## 2024-08-12 NOTE — PROGRESS NOTES
Primary Care Physician: Unruly Smith MD   Admitting Physician:  Unruly Smith MD  Admission date and time: 8/11/2024 12:41 PM    Room:  [address] [place] [address]  Admitting diagnosis: Hypokalemia [E87.6]  Sore throat [J02.9]  Esophagitis [K20.90]  Dysphagia, idiopathic [R13.10]  Dysphagia [R13.10]  Dysphagia, unspecified type [R13.10]    Patient Name: Erin Reyna  MRN: [id number]    Date of Service: 8/12/2024     Subjective:  Erin is a 84 y.o. female who was seen and examined today,8/12/2024, at the bedside.     No family present during my examination.    ROS:  General:   Denies chills, fatigue, fever, malaise, night sweats or weight loss; ++ poor appetite     Psychological:   Denies anxiety, disorientation or hallucinations     ENT:    Denies epistaxis, headaches, vertigo or visual changes; ++ sore throat     Cardiovascular:   Denies any chest pain, irregular heartbeats, or palpitations. No paroxysmal nocturnal dyspnea.     Respiratory:   Denies shortness of breath, coughing, sputum production, hemoptysis, or wheezing.  No orthopnea.     Gastrointestinal:   Denies nausea, vomiting, diarrhea, or constipation.  Denies any abdominal pain.  Denies change in bowel habits or stools.       Genito-Urinary:    Denies any urgency, frequency, hematuria.  Voiding without difficulty.     Musculoskeletal:   Denies joint pain, joint stiffness, joint swelling or muscle pain     Neurology:    Denies any headache. No weakness or paresthesia; ++ dementia     Derm:    Denies any rashes, ulcers, or excoriations.  Denies bruising.      Extremities:   Denies any lower extremity swelling or edema.    Physical Exam:  No intake/output data recorded.    Intake/Output Summary (Last 24 hours) at 8/12/2024 1809  Last data filed at 8/12/2024 1759  Gross per 24 hour   Intake 0 ml   Output --   Net 0 ml   No intake/output data recorded.  Patient Vitals for the past 96 hrs (Last 3 readings):   Weight   08/12/24 0600 63.9 kg (140 lb 14 oz)

## 2024-08-13 PROBLEM — N18.30 CHRONIC KIDNEY DISEASE, STAGE III (MODERATE) (HCC): Status: ACTIVE | Noted: 2024-08-13

## 2024-08-13 PROBLEM — F03.90 DEMENTIA WITHOUT BEHAVIORAL DISTURBANCE (HCC): Status: ACTIVE | Noted: 2024-08-13

## 2024-08-13 PROBLEM — E87.6 HYPOKALEMIA: Status: ACTIVE | Noted: 2024-08-13

## 2024-08-13 PROBLEM — J02.9 SORE THROAT: Status: ACTIVE | Noted: 2024-08-13

## 2024-08-13 PROBLEM — K22.2 ESOPHAGEAL STRICTURE: Status: ACTIVE | Noted: 2024-08-13

## 2024-08-13 PROBLEM — R63.0 POOR APPETITE: Status: ACTIVE | Noted: 2024-08-13

## 2024-08-13 LAB
BASOPHILS # BLD: 0.04 K/UL (ref 0–0.2)
BASOPHILS NFR BLD: 1 % (ref 0–2)
EOSINOPHIL # BLD: 0.21 K/UL (ref 0.05–0.5)
EOSINOPHILS RELATIVE PERCENT: 4 % (ref 0–6)
ERYTHROCYTE [DISTWIDTH] IN BLOOD BY AUTOMATED COUNT: 14.2 % (ref 11.5–15)
HCT VFR BLD AUTO: 40.7 % (ref 34–48)
HGB BLD-MCNC: 13.5 G/DL (ref 11.5–15.5)
IMM GRANULOCYTES # BLD AUTO: <0.03 K/UL (ref 0–0.58)
IMM GRANULOCYTES NFR BLD: 0 % (ref 0–5)
LYMPHOCYTES NFR BLD: 1.33 K/UL (ref 1.5–4)
LYMPHOCYTES RELATIVE PERCENT: 24 % (ref 20–42)
MCH RBC QN AUTO: 31.4 PG (ref 26–35)
MCHC RBC AUTO-ENTMCNC: 33.2 G/DL (ref 32–34.5)
MCV RBC AUTO: 94.7 FL (ref 80–99.9)
MONOCYTES NFR BLD: 0.46 K/UL (ref 0.1–0.95)
MONOCYTES NFR BLD: 8 % (ref 2–12)
NEUTROPHILS NFR BLD: 63 % (ref 43–80)
NEUTS SEG NFR BLD: 3.49 K/UL (ref 1.8–7.3)
PLATELET # BLD AUTO: 146 K/UL (ref 130–450)
PMV BLD AUTO: 9.9 FL (ref 7–12)
RBC # BLD AUTO: 4.3 M/UL (ref 3.5–5.5)
WBC OTHER # BLD: 5.6 K/UL (ref 4.5–11.5)

## 2024-08-13 PROCEDURE — 6360000002 HC RX W HCPCS: Performed by: INTERNAL MEDICINE

## 2024-08-13 PROCEDURE — 92610 EVALUATE SWALLOWING FUNCTION: CPT | Performed by: SPEECH-LANGUAGE PATHOLOGIST

## 2024-08-13 PROCEDURE — 6370000000 HC RX 637 (ALT 250 FOR IP): Performed by: INTERNAL MEDICINE

## 2024-08-13 PROCEDURE — 6360000002 HC RX W HCPCS: Performed by: HOSPITALIST

## 2024-08-13 PROCEDURE — 1200000000 HC SEMI PRIVATE

## 2024-08-13 PROCEDURE — 2580000003 HC RX 258: Performed by: INTERNAL MEDICINE

## 2024-08-13 PROCEDURE — 2580000003 HC RX 258: Performed by: HOSPITALIST

## 2024-08-13 PROCEDURE — 97530 THERAPEUTIC ACTIVITIES: CPT

## 2024-08-13 PROCEDURE — 97165 OT EVAL LOW COMPLEX 30 MIN: CPT

## 2024-08-13 PROCEDURE — 97110 THERAPEUTIC EXERCISES: CPT | Performed by: PHYSICAL THERAPIST

## 2024-08-13 PROCEDURE — 85025 COMPLETE CBC W/AUTO DIFF WBC: CPT

## 2024-08-13 PROCEDURE — 36415 COLL VENOUS BLD VENIPUNCTURE: CPT

## 2024-08-13 PROCEDURE — 97161 PT EVAL LOW COMPLEX 20 MIN: CPT | Performed by: PHYSICAL THERAPIST

## 2024-08-13 RX ADMIN — DICLOFENAC SODIUM 2 G: 10 GEL TOPICAL at 19:43

## 2024-08-13 RX ADMIN — GABAPENTIN 600 MG: 300 CAPSULE ORAL at 19:42

## 2024-08-13 RX ADMIN — DICLOFENAC SODIUM 2 G: 10 GEL TOPICAL at 11:06

## 2024-08-13 RX ADMIN — SODIUM CHLORIDE, PRESERVATIVE FREE 10 ML: 5 INJECTION INTRAVENOUS at 19:47

## 2024-08-13 RX ADMIN — SODIUM CHLORIDE, PRESERVATIVE FREE 40 MG: 5 INJECTION INTRAVENOUS at 11:03

## 2024-08-13 RX ADMIN — GABAPENTIN 600 MG: 300 CAPSULE ORAL at 14:51

## 2024-08-13 RX ADMIN — SODIUM CHLORIDE, PRESERVATIVE FREE 40 MG: 5 INJECTION INTRAVENOUS at 19:42

## 2024-08-13 RX ADMIN — ENOXAPARIN SODIUM 40 MG: 100 INJECTION SUBCUTANEOUS at 11:05

## 2024-08-13 RX ADMIN — TICAGRELOR 90 MG: 90 TABLET ORAL at 19:42

## 2024-08-13 ASSESSMENT — PAIN DESCRIPTION - LOCATION: LOCATION: FOOT

## 2024-08-13 ASSESSMENT — PAIN DESCRIPTION - DESCRIPTORS: DESCRIPTORS: ACHING

## 2024-08-13 ASSESSMENT — PAIN DESCRIPTION - ORIENTATION: ORIENTATION: RIGHT;LEFT

## 2024-08-13 NOTE — PROGRESS NOTES
Physical Therapy  Physical Therapy Initial Evaluation/Plan of Care    Room #:  0324/0324-02  Patient Name: Erin Reyna  YOB: 1940  MRN: [id number]    Date of Service: 8/13/2024     Tentative placement recommendation: Skilled Nursing Facility with Physical Therapy   Equipment recommendation: To be determined      Evaluating Physical Therapist: Jonathon Miller, PT, DPT #[id number]      Specific Provider Orders/Date/Referring Provider :     08/12/24 0015    PT evaluation and treat  Start:  08/12/24 0015,   End:  08/12/24 0015,   ONE TIME,   Standing Count:  1 Occurrences,   R       Order went unreviewed at transfer on Mon Aug 12, 2024  8:23 AM  Unruly Smith MD Acknowledge New    Admitting Diagnosis:   Hypokalemia [E87.6]  Sore throat [J02.9]  Esophagitis [K20.90]  Dysphagia, idiopathic [R13.10]  Dysphagia [R13.10]  Dysphagia, unspecified type [R13.10]      Surgery: none      Patient Active Problem List   Diagnosis    Bilateral carpal tunnel syndrome    Elevated hemoglobin (HCC)    Right carpal tunnel syndrome    DDD (degenerative disc disease), lumbar    Spondylolisthesis, lumbar region    Spinal stenosis of lumbar region    Lumbosacral spondylosis without myelopathy    Lumbar radiculopathy    Odynophagia    Esophagitis    Dysphagia    Dysphagia, idiopathic    Esophageal stricture    Hypokalemia    Dementia without behavioral disturbance (HCC)    Chronic kidney disease, stage III (moderate) (HCC)    Sore throat    Poor appetite        ASSESSMENT of Current Deficits Patient exhibits decreased strength, balance, and endurance impairing functional mobility, transfers, gait , gait distance, and tolerance to activity. Pt weak and deconditioned during session with encouragement needed to maximize session. Pt required ModA for all function with VC for safety and sequencing. Pt agreeable to seated exercises sitting EOB following function with Supervision.     PHYSICAL THERAPY  PLAN OF CARE       Physical

## 2024-08-13 NOTE — PROGRESS NOTES
SPEECH/LANGUAGE PATHOLOGY  CLINICAL ASSESSMENT OF SWALLOWING FUNCTION   and PLAN OF CARE    PATIENT NAME:  Erin Reyna  (female)     MRN:  [id number]    :  1940  (84 y.o.)  STATUS:  Inpatient: Room 0324/0324-02    TODAY'S DATE:  2024  REFERRING PROVIDER:      SLP eval and treat  Start:  24 1400,   End:  24 1400,   ONE TIME,   Standing Count:  1 Occurrences,   R       Unruly Smith MD  REASON FOR REFERRAL: dysphagia   EVALUATING THERAPIST: MICHELLE Frankel                 RESULTS:    DYSPHAGIA DIAGNOSIS:   functional oropharyngeal swallow for age/premorbid functioning      DIET RECOMMENDATIONS:  Soft and bite size consistency solids (IDDSI level 6) with  thin liquids (IDDSI level 0)     FEEDING RECOMMENDATIONS:     Assistance level:  Encourage self-feeding as function allows  Verbal cueing for implementation of safe swallow strategies       Compensatory strategies recommended:   PER GI:   Recommend small bites, chew well, eat slow, with sips of water before and after each bite    Discussed recommendations with:  caregiver/family     SPEECH THERAPY  PLAN OF CARE   The dysphagia POC is established based on physician order, dysphagia diagnosis and results of clinical assessment     Skilled SLP intervention for dysphagia management on acute care up to 5 x per week until goals met, pt plateaus in function and/or discharged from hospital    Conditions Requiring Skilled Therapeutic Intervention for dysphagia:    Patient is performing below functional baseline d/t  current acute condition, respiratory compromise, multiple medications, and/or increased dependency upon caregivers.    Specific dysphagia interventions to include:     compensatory swallowing strategies to improve airway protection and swallow function.  Training in positioning for improved integrity of swallow    Specific instructions for next treatment:  development and training of compensatory swallow strategies to improve

## 2024-08-13 NOTE — DISCHARGE INSTR - COC
Continuity of Care Form     Patient Name: Erin Reyna   :  1940                       MRN:  [id number]     Admit date:  2024                      Discharge date:  8/15/2024     Code Status Order: DNR-CC   Advance Directives:   Advance Care Flowsheet Documentation                 Admitting Physician:  Unruly Smith MD  PCP: Unruly Smith MD     Discharging Nurse: [facility] Unit/Room#: 0324/0324-02  Discharging Unit Phone Number: [phone]     Emergency Contact:   Extended Emergency Contact Information  Primary Emergency Contact: Kathy Armijo   [facility]  Home Phone: [phone]  Mobile Phone: [phone]  Relation: Child  Secondary Emergency Contact: Froilan Armijo  Home Phone: [phone]  Mobile Phone: [phone]  Relation: Child     Past Surgical History:        Past Surgical History:   Procedure Laterality Date    ANESTHESIA NERVE BLOCK Bilateral 12/10/2019     RIGHT L5 AND LEFT L4 TRANSFORAMINAL LUMBAR EPIDURAL UNDER FLUOROSCOPY (CPT 90879) performed by Modesto Pearson MD at [facility] OR    APPENDECTOMY        CARPAL TUNNEL RELEASE Right 2019     RIGHT ENDOSCOPIC CARPAL TUNNEL RELEASE performed by Santana Alvarado MD at [facility] OR    CARPAL TUNNEL RELEASE Left 2019     LEFT ENDOSCOPIC CARPAL TUNNEL RELEASE performed by Santana Alvarado MD at [facility] OR    CATARACT REMOVAL WITH IMPLANT Right 13    CATARACT REMOVAL WITH IMPLANT Left 13    CHOLECYSTECTOMY        open    EPIDURAL STEROID INJECTION N/A 2019     LUMBAR EPIDURAL STEROID INJECTION L4-5 UNDER FLUOROSCOPIC GUIDANCE performed by Modesto Pearson MD at [facility] OR    EPIDURAL STEROID INJECTION Bilateral 9/3/2019     ATTAMPTED BILATERAL LUMBAR TRANSFORAMINAL EPIDURAL STEROID INJECTION LEFT L4 AND RIGHT  L5 UNDER FLUOROSCOPIC GUIDANCE performed by Modesto Pearson MD at [facility] OR    HYSTERECTOMY (CERVIX STATUS UNKNOWN)       NERVE BLOCK N/A                       Nurse Assessment:  Last Vital Signs: /60   Pulse 75   Temp 98.4 °F (36.9 °C) (Oral)   Resp 17   Ht 1.626 m (5' 4\")   Wt 63.9 kg (140 lb 14 oz)   SpO2 95%   BMI 24.18 kg/m²     Last documented pain score (0-10 scale): Pain Level:  (no worse. would not give a number)  Last Weight:       Wt Readings from Last 1 Encounters:   08/12/24 63.9 kg (140 lb 14 oz)      Mental Status:  disoriented and alert     IV Access:  - None     Nursing Mobility/ADLs:  Walking            Assisted  Transfer            Assisted  Bathing             Assisted  Dressing           Assisted  Toileting           Assisted  Feeding            Independent  Med Admin       Assisted  Med Delivery   whole     Wound Care Documentation and Therapy:  Incision 07/09/19 Back (Active)   Number of days: 1861       Incision 09/03/19 Back (Active)   Number of days: 1805         Elimination:  Continence:   Bowel: No  Bladder: No  Urinary Catheter: None   Colostomy/Ileostomy/Ileal Conduit: No     Date of Last BM: ***     Intake/Output Summary (Last 24 hours) at 8/13/2024 1219  Last data filed at 8/13/2024 0518      Gross per 24 hour   Intake 600.33 ml   Output 400 ml   Net 200.33 ml      I/O last 3 completed shifts:  In: 600.3 [I.V.:600.3]  Out: 400 [Urine:400]     Safety Concerns:     At Risk for Falls     Impairments/Disabilities:      Vision     Nutrition Therapy:  Current Nutrition Therapy:   - Oral Diet:  Dysphagia - Soft and Bite Sized     Routes of Feeding: Oral  Liquids: Thin Liquids  Daily Fluid Restriction: no  Last Modified Barium Swallow with Video (Video Swallowing Test): not done     Treatments at the Time of Hospital Discharge:   Respiratory Treatments: ***  Oxygen Therapy:  is not on home oxygen therapy.  Ventilator:    - No ventilator support     Rehab Therapies: Physical Therapy and Occupational Therapy  Weight Bearing Status/Restrictions: No weight bearing restrictions  Other Medical Equipment (for information

## 2024-08-13 NOTE — PROGRESS NOTES
OCCUPATIONAL THERAPY INITIAL EVALUATION    [facility]  [address]  [place]. [place]        Date:2024                                                  Patient Name: Erin Reyna    MRN: [id number]    : 1940    Room: [address] [place] [address]      Evaluating OT: Ambika Beer OTR/L #[id number]     Referring Provider and Specific Provider Orders/Date:      24  OT eval and treat  Start:  24,   End:  24,   ONE TIME,   Standing Count:  1 Occurrences,   R        Order went unreviewed at transfer on Mon Aug 12, 2024  8:23 AM    Unruly Smith MD      Placement Recommendation: Subacute Rehab        Diagnosis:   1. Esophagitis    2. Sore throat    3. Dysphagia, unspecified type    4. Hypokalemia         Surgery: None       Pertinent Medical History:       Past Medical History:   Diagnosis Date    Carpal tunnel syndrome, bilateral     L hand OR 7-2-19     DDD (degenerative disc disease), cervical     Elevated hemoglobin (HCC) 2018    Lumbar radiculopathy     Osteoarthritis          Past Surgical History:   Procedure Laterality Date    ANESTHESIA NERVE BLOCK Bilateral 12/10/2019    RIGHT L5 AND LEFT L4 TRANSFORAMINAL LUMBAR EPIDURAL UNDER FLUOROSCOPY (CPT 30591) performed by Modesto Pearson MD at [facility] OR    APPENDECTOMY      CARPAL TUNNEL RELEASE Right 2019    RIGHT ENDOSCOPIC CARPAL TUNNEL RELEASE performed by Santana Alvarado MD at [facility] OR    CARPAL TUNNEL RELEASE Left 2019    LEFT ENDOSCOPIC CARPAL TUNNEL RELEASE performed by Santana Alvarado MD at [facility] OR    CATARACT REMOVAL WITH IMPLANT Right 13    CATARACT REMOVAL WITH IMPLANT Left 13    CHOLECYSTECTOMY      open    EPIDURAL STEROID INJECTION N/A 2019    LUMBAR EPIDURAL STEROID INJECTION L4-5 UNDER FLUOROSCOPIC GUIDANCE performed by Modesto Peasron MD at [facility] OR    EPIDURAL STEROID INJECTION Bilateral 9/3/2019    ATTAMPTED

## 2024-08-13 NOTE — CARE COORDINATION
CM note: discharge order noted for patient.  Pt came to the hospital from [facility] nursing facility.  Spoke with patient's daughter, Kathy and the plan is for patient to return there.  Per liaison for [facility] patient needs therapy evals so that they can obtain pre-cert.  OT note is in but still awaiting PT eval. GI recommending diet come from speech recommendation, consult for SLP placed.  Pt will NEED PRE-CERT and JORDY to return to [facility].

## 2024-08-13 NOTE — PROGRESS NOTES
PROGRESS NOTE  By Martín Desai M.D.    [facility]  Dr. Saul Leyva M.D.,  Dr. Galo Agosto M.D.,   Dr. Myron Mart D.O.,  Dr. Catarino Hinojosa M.D.,  SEBASTIAN ErazoO.,          Erin Reyna  84 y.o.  female    SUBJECTIVE:  Patient denies abdominal pain.  She denies nausea vomiting.  No family bedside    OBJECTIVE:    /60   Pulse 75   Temp 98.4 °F (36.9 °C) (Oral)   Resp 17   Ht 1.626 m (5' 4\")   Wt 63.9 kg (140 lb 14 oz)   SpO2 95%   BMI 24.18 kg/m²     General: NAD/older adult  female  HEENT: Anicteric sclera/moist oral mucosa  Neck: Supple trachea is midline  Chest: CTAB/symmetric excursions  Cor: Regular/S1-S2  Abd.: Soft.  Nontender.  Appears nondistended.  Bowel sounds present  Extr.:  Decreased muscle tone and bulk throughout.  No cyanosis or clubbing  Skin: Warm and dry/anicteric        DATA:    Esophagogramr images reviewed/report read - no significant stricture/obstruction/reflux       Lab Results   Component Value Date/Time    WBC 5.6 08/13/2024 04:43 AM    RBC 4.30 08/13/2024 04:43 AM    HGB 13.5 08/13/2024 04:43 AM    HCT 40.7 08/13/2024 04:43 AM    MCV 94.7 08/13/2024 04:43 AM    MCH 31.4 08/13/2024 04:43 AM    MCHC 33.2 08/13/2024 04:43 AM    RDW 14.2 08/13/2024 04:43 AM     08/13/2024 04:43 AM    MPV 9.9 08/13/2024 04:43 AM     Lab Results   Component Value Date/Time     08/11/2024 02:33 PM    K 3.1 08/11/2024 02:33 PM    CL 96 08/11/2024 02:33 PM    CO2 34 08/11/2024 02:33 PM    BUN 18 08/11/2024 02:33 PM    CREATININE 1.1 08/11/2024 02:33 PM    CALCIUM 9.3 08/11/2024 02:33 PM    BILITOT 0.7 08/11/2024 02:33 PM    ALKPHOS 93 08/11/2024 02:33 PM    AST 14 08/11/2024 02:33 PM    ALT 6 08/11/2024 02:33 PM     Lab Results   Component Value Date/Time    LIPASE 17 07/25/2024 09:58 AM     No results found for: \"AMYLASE\"      ASSESSMENT/PLAN:  Patient Active Problem List   Diagnosis    Bilateral carpal tunnel syndrome    Elevated

## 2024-08-13 NOTE — DISCHARGE INSTRUCTIONS
Your information:  Name: Erin Reyna  : 1940    Your instructions:  Discharge to [facility]    Signs and symptoms to watch out for:  Trouble getting food or liquids to go down on the first try.  Gagging, choking, or coughing when you swallow.  Having food or liquids come back up through your throat, mouth, or nose after you swallow.  Feeling like foods or liquids are stuck in some part of your throat or chest.  Pain when you swallow.    What to do after you leave the hospital:    Recommended diet:  Soft and bite sized    Recommended activity: activity as tolerated    The following personal items were collected during your admission and were returned to you:    Belongings  Dental Appliances: Lowers, Uppers  Vision - Corrective Lenses: Eyeglasses, At home  Hearing Aid: None  Clothing: At home  Jewelry: None  Body Piercings Removed: No  Electronic Devices: None  Weapons (Notify Protective Services/Security): None  Other Valuables: At home  Home Medications: None  Valuables Given To: Patient  Provide Name(s) of Who Valuable(s) Were Given To: daniel  Responsible person(s) in the waiting room: na  Patient approves for provider to speak to responsible person post operatively: Yes    Information obtained by:  By signing below, I understand that if any problems occur once I leave the hospital I am to contact Dr. Smith.  I understand and acknowledge receipt of the instructions indicated above.

## 2024-08-13 NOTE — PROGRESS NOTES
Primary Care Physician: Unruly Smith MD   Admitting Physician:  Unruly Smith MD  Admission date and time: 8/11/2024 12:41 PM    Room:  [address] [place] [address]  Admitting diagnosis: Hypokalemia [E87.6]  Sore throat [J02.9]  Esophagitis [K20.90]  Dysphagia, idiopathic [R13.10]  Dysphagia [R13.10]  Dysphagia, unspecified type [R13.10]    Patient Name: Erin Reyna  MRN: [id number]    Date of Service: 8/13/2024     Subjective:  Erin is a 84 y.o. female who was seen and examined today,8/13/2024, at the bedside.     No family present during my examination.    ROS:  General:   Denies chills, fatigue, fever, malaise, night sweats or weight loss; ++ poor appetite     Psychological:   Denies anxiety, disorientation or hallucinations     ENT:    Denies epistaxis, headaches, vertigo or visual changes; ++ sore throat     Cardiovascular:   Denies any chest pain, irregular heartbeats, or palpitations. No paroxysmal nocturnal dyspnea.     Respiratory:   Denies shortness of breath, coughing, sputum production, hemoptysis, or wheezing.  No orthopnea.     Gastrointestinal:   Denies nausea, vomiting, diarrhea, or constipation.  Denies any abdominal pain.  Denies change in bowel habits or stools.       Genito-Urinary:    Denies any urgency, frequency, hematuria.  Voiding without difficulty.     Musculoskeletal:   Denies joint pain, joint stiffness, joint swelling or muscle pain     Neurology:    Denies any headache. No weakness or paresthesia; ++ dementia     Derm:    Denies any rashes, ulcers, or excoriations.  Denies bruising.      Extremities:   Denies any lower extremity swelling or edema.    Physical Exam:  I/O this shift:  In: -   Out: 650 [Urine:650]    Intake/Output Summary (Last 24 hours) at 8/13/2024 1806  Last data filed at 8/13/2024 1759  Gross per 24 hour   Intake 600.33 ml   Output 1050 ml   Net -449.67 ml   I/O last 3 completed shifts:  In: 600.3 [I.V.:600.3]  Out: 400 [Urine:400]  Patient Vitals for the past 96 hrs  Results   Component Value Date/Time    K 3.1 08/11/2024 02:33 PM       FL ESOPHAGRAM   Final Result   Unremarkable single contrast esophagram.         CT CHEST W CONTRAST   Final Result      XR CHEST PORTABLE   Final Result   No pneumonia or pleural effusion.                    Assessment:  Dysphagia  Esophagitis  Esophageal stricture  Hypokalemia  Copd  Chronic kidney disease stage III  Dementia without behavioral disturbance  Sore throat  Poor appetite      Plan:     Admit to general medical floor  Continue home medications  Replenish potassium  Start IV fluids  DVT prophylaxis  Keep NPO  Consulted Dr Myron Mart advised:  Pt with continued dysphagia. EGD 5/28/24 with LA A Esophagitis with dilation with 52 F Hughes. MBS normal 5/29/24. Consider motility D/O. Plan for esophagram. Pt will need esophageal motility study, inpt if allowed. Recommend small bites, chew well, eat slow, with sips of water before and after each bite. Our service will follow. See orders.     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    Continue current therapy.  See orders for further plan of care.      More than 50% of my time was spent at the bedside counseling/coordinating care with the patient and/or family with face to face contact.  This time was spent reviewing notes and laboratory data as well as instructing and counseling the patient. Time I spent with the family or surrogate(s) is included only if the patient was incapable of providing the necessary information or participating in medical decisions. I also discussed the differential diagnosis and all of the proposed

## 2024-08-14 PROCEDURE — 1200000000 HC SEMI PRIVATE

## 2024-08-14 PROCEDURE — 6360000002 HC RX W HCPCS: Performed by: INTERNAL MEDICINE

## 2024-08-14 PROCEDURE — 97530 THERAPEUTIC ACTIVITIES: CPT | Performed by: PHYSICAL THERAPIST

## 2024-08-14 PROCEDURE — 6370000000 HC RX 637 (ALT 250 FOR IP): Performed by: INTERNAL MEDICINE

## 2024-08-14 PROCEDURE — 2580000003 HC RX 258: Performed by: INTERNAL MEDICINE

## 2024-08-14 PROCEDURE — 6360000002 HC RX W HCPCS: Performed by: HOSPITALIST

## 2024-08-14 PROCEDURE — 97110 THERAPEUTIC EXERCISES: CPT | Performed by: PHYSICAL THERAPIST

## 2024-08-14 PROCEDURE — 92526 ORAL FUNCTION THERAPY: CPT

## 2024-08-14 PROCEDURE — 2580000003 HC RX 258: Performed by: HOSPITALIST

## 2024-08-14 RX ADMIN — DICLOFENAC SODIUM 2 G: 10 GEL TOPICAL at 10:23

## 2024-08-14 RX ADMIN — DEXTROSE AND SODIUM CHLORIDE: 5; 900 INJECTION, SOLUTION INTRAVENOUS at 22:15

## 2024-08-14 RX ADMIN — METOPROLOL SUCCINATE 25 MG: 25 TABLET, EXTENDED RELEASE ORAL at 10:16

## 2024-08-14 RX ADMIN — GABAPENTIN 600 MG: 300 CAPSULE ORAL at 10:17

## 2024-08-14 RX ADMIN — SODIUM CHLORIDE, PRESERVATIVE FREE 10 ML: 5 INJECTION INTRAVENOUS at 22:14

## 2024-08-14 RX ADMIN — SODIUM CHLORIDE, PRESERVATIVE FREE 40 MG: 5 INJECTION INTRAVENOUS at 22:13

## 2024-08-14 RX ADMIN — DICLOFENAC SODIUM 2 G: 10 GEL TOPICAL at 22:14

## 2024-08-14 RX ADMIN — TICAGRELOR 90 MG: 90 TABLET ORAL at 10:19

## 2024-08-14 RX ADMIN — FOLIC ACID 2 MG: 1 TABLET ORAL at 10:16

## 2024-08-14 RX ADMIN — POTASSIUM BICARBONATE 20 MEQ: 782 TABLET, EFFERVESCENT ORAL at 10:22

## 2024-08-14 RX ADMIN — COLCHICINE 0.6 MG: 0.6 TABLET, FILM COATED ORAL at 10:16

## 2024-08-14 RX ADMIN — Medication 2000 UNITS: at 10:16

## 2024-08-14 RX ADMIN — SODIUM CHLORIDE, PRESERVATIVE FREE 40 MG: 5 INJECTION INTRAVENOUS at 10:10

## 2024-08-14 RX ADMIN — GABAPENTIN 600 MG: 300 CAPSULE ORAL at 15:50

## 2024-08-14 RX ADMIN — ROSUVASTATIN CALCIUM 20 MG: 10 TABLET, FILM COATED ORAL at 10:17

## 2024-08-14 RX ADMIN — TICAGRELOR 90 MG: 90 TABLET ORAL at 22:14

## 2024-08-14 RX ADMIN — GABAPENTIN 600 MG: 300 CAPSULE ORAL at 22:13

## 2024-08-14 RX ADMIN — ENOXAPARIN SODIUM 40 MG: 100 INJECTION SUBCUTANEOUS at 10:32

## 2024-08-14 ASSESSMENT — PAIN DESCRIPTION - DESCRIPTORS: DESCRIPTORS: ACHING

## 2024-08-14 ASSESSMENT — PAIN DESCRIPTION - LOCATION: LOCATION: FOOT

## 2024-08-14 ASSESSMENT — PAIN SCALES - GENERAL
PAINLEVEL_OUTOF10: 0
PAINLEVEL_OUTOF10: 0

## 2024-08-14 ASSESSMENT — PAIN DESCRIPTION - ORIENTATION: ORIENTATION: RIGHT;LEFT

## 2024-08-14 NOTE — CARE COORDINATION
CM note; [facility] nursing facility notified that all therapy evals were completed/documented.  They will submit for pre-cert today.  Pt can discharge back to [facility] once pre-cert obtained.  No HENS needed as patient is a return.  JORDY completed/signed.

## 2024-08-14 NOTE — PROGRESS NOTES
ENDOSCOPY       SUBJECTIVE:    Precautions: Up with assistance, falls, alarm, confusion, and dysphagia, dementia      Social history: Patient from SNF    Equipment owned: Equipment at Nursing Home    AM-PAC Basic Mobility       AM-PAC Basic Mobility - Inpatient   How much help is needed turning from your back to your side while in a flat bed without using bedrails?: A Lot  How much help is needed moving from lying on your back to sitting on the side of a flat bed without using bedrails?: A Lot  How much help is needed moving to and from a bed to a chair?: A Lot  How much help is needed standing up from a chair using your arms?: A Lot  How much help is needed walking in hospital room?: Total  How much help is needed climbing 3-5 steps with a railing?: Total  AM-PAC Inpatient Mobility Raw Score : 10  AM-PAC Inpatient T-Scale Score : 32.29  Mobility Inpatient CMS 0-100% Score: 76.75  Mobility Inpatient CMS G-Code Modifier : CL    Nursing cleared patient for PT treatment.      OBJECTIVE:   Initial Evaluation  Date: 8/13/2024 Treatment Date:  8/14/2024     Short Term/ Long Term   Goals   Was pt agreeable to Eval/treatment? Yes Y To be met in 2 days   Pain level   0/10   0/10    Bed Mobility  Using rails and head of bed elevated:     Rolling: Moderate assist of 1    Supine to sit: Moderate assist of 1    Sit to supine: Moderate assist of 1    Scooting: Moderate assist of 1   Using rails and head of bed elevated:     Rolling: Moderate assist of 1   Supine to sit: Moderate assist of 1   Sit to supine: Moderate assist of 1   Scooting: Moderate assist of 1    Rolling: Minimal assist of 1    Supine to sit: Minimal assist of 1    Sit to supine: Minimal assist of 1    Scooting: Minimal assist of 1     Transfers Sit to stand: Moderate assist of 1  Sit to stand: Moderate assist of 1    Sit to stand: Minimal assist of 1    Ambulation     6 side steps using  wheeled walker with Moderate assist of 1   for walker control, walker  approximation, balance, upright, weight shift, multiplane instability, and safety 3-5 feet using  wheeled walker with Moderate assist of 1   for walker control, walker approximation, balance, upright, weight shift, multiplane instability, and safety     > 15 feet using  wheeled walker with Minimal assist of 1    ROM Within functional limits    Increase range of motion 10% of affected joints    Strength BUE:  refer to OT eval  RLE:  3+/5  LLE:   3+/5  Increase strength in affected mm groups by 1/3 grade   Balance Sitting EOB:  fair   Dynamic Standing:  fair- with WW Sitting EOB: fair  Dynamic Standing: fair- with WW   Sitting EOB:  fair+  Dynamic Standing: fair with WW     Patient is Alert & Oriented x person and follows directions pleasantly confused  Sensation:  Patient  denies numbness/tingling   Edema:  no   Endurance: fair -    Vitals: room air   Blood Pressure at rest  Blood Pressure during session    Heart Rate at rest  Heart Rate during session    SPO2 at rest %  SPO2 during session %     Patient education  Patient educated on role of Physical Therapy, risks of immobility, safety and plan of care, energy conservation,  importance of mobility while in hospital , ankle pumps, quad set and glut set for edema control, blood clot prevention, importance and purpose of adaptive device and adjusted to proper height for the patient., safety , and positioning for skin integrity and comfort     Patient response to education:   Pt verbalized understanding Pt demonstrated skill Pt requires further education in this area   Yes Partial Yes      Treatment:  Patient practiced and was instructed/facilitated in the following treatment: Patient Sat edge of bed 10 minutes with Supervision  to increase dynamic sitting balance and activity tolerance. Pt performed bed mobility, transferred to chair, seated exercises.     Therapeutic Exercises:  ankle pumps, heel raises, long arc quad, and seated marching  x 12-15 reps        At end

## 2024-08-14 NOTE — PROGRESS NOTES
[facility] ALL NOTES  SPEECH LANGUAGE PATHOLOGY  DAILY PROGRESS NOTE      PATIENT NAME:  Erin Reyna      :  1940          TODAY'S DATE:  2024 ROOM:  [address] [place] [address]    Current Diet: ADULT DIET; Dysphagia - Soft and Bite Sized    Patient in chair, upright and alert, soft bite sized dinner tray present, patient feeding self. Patient demonstrating difficulty with feeding, SLP prompted patient to use spoon instead of fork which eliminated difficulty with feeding. Mastication within functional limits. Patient cleared oral cavity with swallow. No overt signs and symptoms of aspiration observed or difficulties reported. Patient took appropriate sized bites and alternated solids and liquids well.     Recommendation: Continue with POC and current diet      CPT code(s) 72983  dysphagia tx  Total minutes :  15 minutes

## 2024-08-14 NOTE — PROGRESS NOTES
PROGRESS NOTE  By Martín Desai M.D.    [facility]  Dr. Saul Leyva M.D.,  Dr. Galo Agosto M.D.,   Dr. Myron Mart D.O.,  Dr. Catarino Hinojosa M.D.,  SEBASTIAN ErazoO.,          Erin ENG drewel  84 y.o.  female    SUBJECTIVE:  No new complaints    OBJECTIVE:    BP (!) 143/74   Pulse 73   Temp 98.5 °F (36.9 °C) (Oral)   Resp 17   Ht 1.626 m (5' 4\")   Wt 63.9 kg (140 lb 14 oz)   SpO2 97%   BMI 24.18 kg/m²     General: NAD/older adult  female  HEENT: Anicteric sclera/moist oral mucosa  Neck: Supple/trachea midline  Chest: Symmetric excursion/nonlabored respirations  Abd.: Soft/nontender and nondistended  Extr.:  Decreased muscle tone and bulk throughout, consistent with age and condition  Skin: Warm and dry    DATA:       Lab Results   Component Value Date/Time    WBC 5.6 08/13/2024 04:43 AM    RBC 4.30 08/13/2024 04:43 AM    HGB 13.5 08/13/2024 04:43 AM    HCT 40.7 08/13/2024 04:43 AM    MCV 94.7 08/13/2024 04:43 AM    MCH 31.4 08/13/2024 04:43 AM    MCHC 33.2 08/13/2024 04:43 AM    RDW 14.2 08/13/2024 04:43 AM     08/13/2024 04:43 AM    MPV 9.9 08/13/2024 04:43 AM     Lab Results   Component Value Date/Time     08/11/2024 02:33 PM    K 3.1 08/11/2024 02:33 PM    CL 96 08/11/2024 02:33 PM    CO2 34 08/11/2024 02:33 PM    BUN 18 08/11/2024 02:33 PM    CREATININE 1.1 08/11/2024 02:33 PM    CALCIUM 9.3 08/11/2024 02:33 PM    BILITOT 0.7 08/11/2024 02:33 PM    ALKPHOS 93 08/11/2024 02:33 PM    AST 14 08/11/2024 02:33 PM    ALT 6 08/11/2024 02:33 PM     Lab Results   Component Value Date/Time    LIPASE 17 07/25/2024 09:58 AM     No results found for: \"AMYLASE\"      ASSESSMENT/PLAN:  Patient Active Problem List   Diagnosis    Bilateral carpal tunnel syndrome    Elevated hemoglobin (HCC)    Right carpal tunnel syndrome    DDD (degenerative disc disease), lumbar    Spondylolisthesis, lumbar region    Spinal stenosis of lumbar region    Lumbosacral spondylosis without

## 2024-08-15 VITALS
WEIGHT: 140.87 LBS | HEART RATE: 77 BPM | RESPIRATION RATE: 16 BRPM | SYSTOLIC BLOOD PRESSURE: 101 MMHG | HEIGHT: 64 IN | OXYGEN SATURATION: 98 % | TEMPERATURE: 97.8 F | BODY MASS INDEX: 24.05 KG/M2 | DIASTOLIC BLOOD PRESSURE: 59 MMHG

## 2024-08-15 PROCEDURE — 6370000000 HC RX 637 (ALT 250 FOR IP): Performed by: INTERNAL MEDICINE

## 2024-08-15 PROCEDURE — 2580000003 HC RX 258: Performed by: HOSPITALIST

## 2024-08-15 PROCEDURE — 6360000002 HC RX W HCPCS: Performed by: HOSPITALIST

## 2024-08-15 PROCEDURE — 6360000002 HC RX W HCPCS: Performed by: INTERNAL MEDICINE

## 2024-08-15 RX ADMIN — ENOXAPARIN SODIUM 40 MG: 100 INJECTION SUBCUTANEOUS at 08:57

## 2024-08-15 RX ADMIN — POTASSIUM BICARBONATE 20 MEQ: 782 TABLET, EFFERVESCENT ORAL at 08:57

## 2024-08-15 RX ADMIN — COLCHICINE 0.6 MG: 0.6 TABLET, FILM COATED ORAL at 08:59

## 2024-08-15 RX ADMIN — GABAPENTIN 600 MG: 300 CAPSULE ORAL at 08:59

## 2024-08-15 RX ADMIN — METOPROLOL SUCCINATE 25 MG: 25 TABLET, EXTENDED RELEASE ORAL at 08:59

## 2024-08-15 RX ADMIN — SODIUM CHLORIDE, PRESERVATIVE FREE 40 MG: 5 INJECTION INTRAVENOUS at 08:57

## 2024-08-15 RX ADMIN — FOLIC ACID 2 MG: 1 TABLET ORAL at 08:59

## 2024-08-15 RX ADMIN — ROSUVASTATIN CALCIUM 20 MG: 10 TABLET, FILM COATED ORAL at 08:59

## 2024-08-15 RX ADMIN — DICLOFENAC SODIUM 2 G: 10 GEL TOPICAL at 09:08

## 2024-08-15 RX ADMIN — MEGESTROL ACETATE 800 MG: 400 SUSPENSION ORAL at 08:57

## 2024-08-15 RX ADMIN — Medication 2000 UNITS: at 08:59

## 2024-08-15 RX ADMIN — LIDOCAINE HYDROCHLORIDE 15 ML: 20 SOLUTION ORAL at 08:56

## 2024-08-15 RX ADMIN — TICAGRELOR 90 MG: 90 TABLET ORAL at 08:59

## 2024-08-15 RX ADMIN — POLYETHYLENE GLYCOL 3350 17 G: 17 POWDER, FOR SOLUTION ORAL at 08:57

## 2024-08-15 ASSESSMENT — PAIN SCALES - GENERAL
PAINLEVEL_OUTOF10: 0

## 2024-08-15 NOTE — CARE COORDINATION
CM note: [facility] has received pre-cert and can accept patient back today.  Transportation arranged with [facility] for a 12:30 PM stretcher as patient is A&O x1 only.  Attempted to notify patient's daughter however she did not answer and her voicemail is not set up.

## 2024-08-15 NOTE — PLAN OF CARE
Problem: Discharge Planning  Goal: Discharge to home or other facility with appropriate resources  8/15/2024 1141 by Wilma Mark, RN  Outcome: Completed  8/15/2024 0722 by Wilma Mark, RN  Outcome: Progressing  8/15/2024 0036 by Gini Kaba, DONYA  Outcome: Progressing      79 07-May-2021 16:26

## 2024-08-15 NOTE — CARE COORDINATION
Left vm with pts daughter on her home phone, will wait for a return call. Electronically signed by Anusha Renteria CMA

## 2024-08-15 NOTE — PLAN OF CARE
Problem: Discharge Planning  Goal: Discharge to home or other facility with appropriate resources  8/15/2024 0722 by Wilma Mark, RN  Outcome: Progressing  8/15/2024 0036 by Gini Kaba, RN  Outcome: Progressing

## 2024-08-15 NOTE — PROGRESS NOTES
Primary Care Physician: Unruly Smith MD   Admitting Physician:  Unruly Smith MD  Admission date and time: 8/11/2024 12:41 PM    Room:  [address] [place] [address]  Admitting diagnosis: Hypokalemia [E87.6]  Sore throat [J02.9]  Esophagitis [K20.90]  Dysphagia, idiopathic [R13.10]  Dysphagia [R13.10]  Dysphagia, unspecified type [R13.10]    Patient Name: Erin Reyna  MRN: [id number]    Date of Service: 8/14/2024     Subjective:  Erin is a 84 y.o. female who was seen and examined today,8/14/2024, at the bedside. Offers no complains, no difficulty in swallowing    No family present during my examination.    ROS:  General:   Denies chills, fatigue, fever, malaise, night sweats or weight loss; ++ poor appetite     Psychological:   Denies anxiety, disorientation or hallucinations     ENT:    Denies epistaxis, headaches, vertigo or visual changes; ++ sore throat   ,  Cardiovascular:   Denies any chest pain, irregular heartbeats, or palpitations. No paroxysmal nocturnal dyspnea.     Respiratory:   Denies shortness of breath, coughing, sputum production, hemoptysis, or wheezing.  No orthopnea.     Gastrointestinal:   Denies nausea, vomiting, diarrhea, or constipation.  Denies any abdominal pain.  Denies change in bowel habits or stools.       Genito-Urinary:    Denies any urgency, frequency, hematuria.  Voiding without difficulty.     Musculoskeletal:   Denies joint pain, joint stiffness, joint swelling or muscle pain     Neurology:    Denies any headache. No weakness or paresthesia; ++ dementia     Derm:    Denies any rashes, ulcers, or excoriations.  Denies bruising.      Extremities:   Denies any lower extremity swelling or edema.    Physical Exam:  No intake/output data recorded.    Intake/Output Summary (Last 24 hours) at 8/14/2024 2103  Last data filed at 8/14/2024 1821  Gross per 24 hour   Intake 933 ml   Output 1000 ml   Net -67 ml   I/O last 3 completed shifts:  In: 1834 [I.V.:1834]  Out: 1650 [Urine:1650]  Patient  Vitals for the past 96 hrs (Last 3 readings):   Weight   08/12/24 1945 63.9 kg (140 lb 14 oz)   08/12/24 0600 63.9 kg (140 lb 14 oz)   08/11/24 1350 63.5 kg (140 lb)     Vital Signs:   Blood pressure (!) 117/59, pulse 74, temperature 98.1 °F (36.7 °C), resp. rate 16, height 1.626 m (5' 4\"), weight 63.9 kg (140 lb 14 oz), SpO2 95%.    CONSTITUTIONAL:    No acute distress, and appears stated age     EYES:    PERRL, EOMI, sclera clear, conjunctiva normal     ENT:    Normocephalic, atraumatic, sinuses nontender on palpation. External ears without lesions. Oral pharynx with moist mucus membranes.  Tonsils without erythema or exudates.     NECK:    Supple, symmetrical, trachea midline, no adenopathy, thyroid symmetric, not enlarged and no tenderness, skin normal, no bruits, no JVD     HEMATOLOGIC/LYMPHATICS:    No cervical lymphadenopathy and no supraclavicular lymphadenopathy     LUNGS:    Symmetric. No increased work of breathing, good air exchange, clear to auscultation bilaterally, no wheezes, or rales; ++ rhonchi     CARDIOVASCULAR:    Normal apical impulse, regular rate and rhythm, normal S1 and S2, no S3 or S4, and no murmur noted     ABDOMEN:    No scars, normal bowel sounds, soft, non-distended, non-tender, no masses palpated, no hepatosplenomegaly, no rebound or guarding elicited on palpation      MUSCULOSKELETAL:    There is no redness, warmth, or swelling of the joints.  Full range of motion noted.  Motor strength is 5 out of 5 all extremities bilaterally.  Tone is normal.     NEUROLOGIC:    Awake, alert, oriented to name, place and time.  Cranial nerves II-XII are grossly intact.  Motor is 5 out of 5 bilaterally.       SKIN:    No bruising or bleeding.  No redness, warmth, or swelling     EXTREMITIES:    Peripheral pulses present.  No edema, cyanosis, or swelling.     OSTEOPATHIC:    Examined in seated and supine positions.  Normal thoracic kyphosis and lumbar lordosis.  No acute somatic dysfunction.

## 2024-08-15 NOTE — PROGRESS NOTES
visited patient. Patient was in bed. Patient appeared to be calm.  provided a listening presence and offered prayer. Patient was grateful for visit. Spiritual care is ongoing as needed.     Chaplain Phil Luna [phone]

## (undated) DEVICE — STANDARD (U) BLADE ASSEMBLY 6PK: Brand: MICROAIRE®

## (undated) DEVICE — Z DISCONTINUED APPLICATOR SURG PREP 0.35OZ 2% CHG 70% ISO ALC W/ HI LT

## (undated) DEVICE — GLOVE ORANGE PI 7   MSG9070

## (undated) DEVICE — SURGICAL PROCEDURE PACK HND

## (undated) DEVICE — BANDAGE ADH W1XL3IN NAT FAB WVN FLX DURABLE N ADH PD SEAL

## (undated) DEVICE — TOWEL,OR,DSP,ST,BLUE,STD,6/PK,12PK/CS: Brand: MEDLINE

## (undated) DEVICE — ZIMMER® STERILE DISPOSABLE TOURNIQUET CUFF WITH PLC, DUAL PORT, SINGLE BLADDER, 18 IN. (46 CM)

## (undated) DEVICE — NON-DEHP CATHETER EXTENSION SET, MALE LUER LOCK ADAPTER

## (undated) DEVICE — COVER HNDL LT DISP

## (undated) DEVICE — 4-PORT MANIFOLD: Brand: NEPTUNE 2

## (undated) DEVICE — TRAY EPI SGL DOSE 18GA NDL CUST AULTMAN HOSP

## (undated) DEVICE — BAG SPECIMEN BIOHAZARD 6IN X 9IN

## (undated) DEVICE — YANKAUER,BULB TIP,W/O VENT,RIGID,STERILE: Brand: MEDLINE

## (undated) DEVICE — INTENDED FOR TISSUE SEPARATION, AND OTHER PROCEDURES THAT REQUIRE A SHARP SURGICAL BLADE TO PUNCTURE OR CUT.: Brand: BARD-PARKER ® STAINLESS STEEL BLADES

## (undated) DEVICE — 6 ML SYRINGE LUER-LOCK TIP: Brand: MONOJECT

## (undated) DEVICE — SUTURE ETHLN SZ 3-0 L18IN NONABSORBABLE BLK L24MM PS-1 3/8 1663G

## (undated) DEVICE — 3 ML SYRINGE LUER-LOCK TIP: Brand: MONOJECT

## (undated) DEVICE — TOWEL OR BLUEE 16X26IN ST 8 PACK ORB08 16X26ORTWL

## (undated) DEVICE — GAUZE,SPONGE,4"X4",12PLY,STERILE,LF,2'S: Brand: MEDLINE

## (undated) DEVICE — SOLUTION IV IRRIG POUR BRL 0.9% SODIUM CHL 2F7124

## (undated) DEVICE — NDL, WHITACRE SPINAL, 22GX3.5": Brand: MEDLINE

## (undated) DEVICE — Device: Brand: DEFENDO VALVE AND CONNECTOR KIT

## (undated) DEVICE — NEEDLE HYPO 25GA L1.5IN BLU POLYPR HUB S STL REG BVL STR

## (undated) DEVICE — MARKER,SKIN,WI/RULER AND LABELS: Brand: MEDLINE

## (undated) DEVICE — GAUZE 2X2IN ST BORDERED

## (undated) DEVICE — NEEDLE, QUINCKE, 22GX5": Brand: MEDLINE

## (undated) DEVICE — COUNTER NDL 10 COUNT HLD 20 FOAM BLK SGL MAG

## (undated) DEVICE — NEEDLE HYPO 18GA L1.5IN PNK POLYPR HUB S STL THN WALL FILL

## (undated) DEVICE — KENDALL 450 SERIES MONITORING FOAM ELECTRODE - RECTANGULAR SHAPE ( 3/PK): Brand: KENDALL

## (undated) DEVICE — COVER,LIGHT HANDLE,FLX,1/PK: Brand: MEDLINE INDUSTRIES, INC.

## (undated) DEVICE — DOUBLE BASIN SET: Brand: MEDLINE INDUSTRIES, INC.

## (undated) DEVICE — 3M™ RED DOT™ MONITORING ELECTRODE WITH FOAM TAPE AND STICKY GEL 2560, 50/BAG, 20/CASE, 72/PLT: Brand: RED DOT™

## (undated) DEVICE — WIPES SKIN CLOTH READYPREP 9 X 10.5 IN 2% CHLORHEX GLUCONATE CHG PREOP

## (undated) DEVICE — 22GX5" WHITACRE SPINAL NEEDLE: Brand: MEDLINE

## (undated) DEVICE — PADDING,UNDERCAST,COTTON, 4"X4YD STERILE: Brand: MEDLINE

## (undated) DEVICE — GOWN ISOLATN XL BLU POLYPR OVR HD OPN BK KNIT CUF PROTCT

## (undated) DEVICE — SPONGE GZ 4IN 4IN 4 PLY N WVN AVANT

## (undated) DEVICE — BLOCK BITE 60FR CAREGUARD

## (undated) DEVICE — 12 ML SYRINGE,LUER-LOCK TIP: Brand: MONOJECT

## (undated) DEVICE — JELLY,LUBE,STERILE,FLIP TOP,TUBE,4-OZ: Brand: MEDLINE

## (undated) DEVICE — FORCEPS BX L240CM JAW DIA2.8MM L CAP W/ NDL MIC MESH TOOTH

## (undated) DEVICE — MASK,FACE,MAXFLUIDPROTECT,SHIELD/ERLPS: Brand: MEDLINE

## (undated) DEVICE — KIT BEDSIDE REVITAL OX 500ML

## (undated) DEVICE — CONTAINER SPEC COLL 960ML POLYPR TRIANG GRAD INTAKE/OUTPUT

## (undated) DEVICE — AIR/WATER CLEANING ADAPTER FOR OLYMPUS® GI ENDOSCOPE: Brand: BULLDOG®

## (undated) DEVICE — GLOVE ORANGE PI 7 1/2   MSG9075

## (undated) DEVICE — TUBING, SUCTION, 1/4" X 10', STRAIGHT: Brand: MEDLINE